# Patient Record
Sex: FEMALE | Race: WHITE | NOT HISPANIC OR LATINO | Employment: OTHER | URBAN - METROPOLITAN AREA
[De-identification: names, ages, dates, MRNs, and addresses within clinical notes are randomized per-mention and may not be internally consistent; named-entity substitution may affect disease eponyms.]

---

## 2017-02-22 ENCOUNTER — HOSPITAL ENCOUNTER (OUTPATIENT)
Dept: RADIOLOGY | Facility: MEDICAL CENTER | Age: 62
End: 2017-02-22
Attending: SURGERY

## 2017-02-22 DIAGNOSIS — Z12.31 SCREENING MAMMOGRAM, ENCOUNTER FOR: ICD-10-CM

## 2017-02-22 PROCEDURE — G0202 SCR MAMMO BI INCL CAD: HCPCS

## 2018-01-05 ENCOUNTER — HOSPITAL ENCOUNTER (OUTPATIENT)
Dept: LAB | Facility: MEDICAL CENTER | Age: 63
End: 2018-01-05
Attending: FAMILY MEDICINE
Payer: COMMERCIAL

## 2018-01-05 LAB
ALBUMIN SERPL BCP-MCNC: 4.1 G/DL (ref 3.2–4.9)
ALBUMIN/GLOB SERPL: 1.4 G/DL
ALP SERPL-CCNC: 104 U/L (ref 30–99)
ALT SERPL-CCNC: 27 U/L (ref 2–50)
ANION GAP SERPL CALC-SCNC: 6 MMOL/L (ref 0–11.9)
APPEARANCE UR: ABNORMAL
AST SERPL-CCNC: 33 U/L (ref 12–45)
BACTERIA #/AREA URNS HPF: ABNORMAL /HPF
BASOPHILS # BLD AUTO: 0 % (ref 0–1.8)
BASOPHILS # BLD: 0 K/UL (ref 0–0.12)
BILIRUB SERPL-MCNC: 0.5 MG/DL (ref 0.1–1.5)
BILIRUB UR QL STRIP.AUTO: NEGATIVE
BUN SERPL-MCNC: 14 MG/DL (ref 8–22)
CALCIUM SERPL-MCNC: 9.5 MG/DL (ref 8.5–10.5)
CHLORIDE SERPL-SCNC: 106 MMOL/L (ref 96–112)
CHOLEST SERPL-MCNC: 197 MG/DL (ref 100–199)
CO2 SERPL-SCNC: 28 MMOL/L (ref 20–33)
COLOR UR: YELLOW
CREAT SERPL-MCNC: 0.81 MG/DL (ref 0.5–1.4)
EOSINOPHIL # BLD AUTO: 0.04 K/UL (ref 0–0.51)
EOSINOPHIL NFR BLD: 1.7 % (ref 0–6.9)
EPI CELLS #/AREA URNS HPF: ABNORMAL /HPF
ERYTHROCYTE [DISTWIDTH] IN BLOOD BY AUTOMATED COUNT: 40.5 FL (ref 35.9–50)
GFR SERPL CREATININE-BSD FRML MDRD: >60 ML/MIN/1.73 M 2
GLOBULIN SER CALC-MCNC: 2.9 G/DL (ref 1.9–3.5)
GLUCOSE SERPL-MCNC: 84 MG/DL (ref 65–99)
GLUCOSE UR STRIP.AUTO-MCNC: NEGATIVE MG/DL
HCT VFR BLD AUTO: 40.1 % (ref 37–47)
HDLC SERPL-MCNC: 47 MG/DL
HGB BLD-MCNC: 13.2 G/DL (ref 12–16)
HYALINE CASTS #/AREA URNS LPF: ABNORMAL /LPF
KETONES UR STRIP.AUTO-MCNC: NEGATIVE MG/DL
LDLC SERPL CALC-MCNC: 130 MG/DL
LEUKOCYTE ESTERASE UR QL STRIP.AUTO: ABNORMAL
LG PLATELETS BLD QL SMEAR: NORMAL
LYMPHOCYTES # BLD AUTO: 0.88 K/UL (ref 1–4.8)
LYMPHOCYTES NFR BLD: 40 % (ref 22–41)
MANUAL DIFF BLD: NORMAL
MCH RBC QN AUTO: 30.5 PG (ref 27–33)
MCHC RBC AUTO-ENTMCNC: 32.9 G/DL (ref 33.6–35)
MCV RBC AUTO: 92.6 FL (ref 81.4–97.8)
MICRO URNS: ABNORMAL
MONOCYTES # BLD AUTO: 0.08 K/UL (ref 0–0.85)
MONOCYTES NFR BLD AUTO: 3.5 % (ref 0–13.4)
MORPHOLOGY BLD-IMP: NORMAL
MUCOUS THREADS #/AREA URNS HPF: ABNORMAL /HPF
NEUTROPHILS # BLD AUTO: 1.21 K/UL (ref 2–7.15)
NEUTROPHILS NFR BLD: 54.8 % (ref 44–72)
NITRITE UR QL STRIP.AUTO: NEGATIVE
NRBC # BLD AUTO: 0 K/UL
NRBC BLD-RTO: 0 /100 WBC
PH UR STRIP.AUTO: 5.5 [PH]
PLATELET # BLD AUTO: 161 K/UL (ref 164–446)
PMV BLD AUTO: 10.3 FL (ref 9–12.9)
POTASSIUM SERPL-SCNC: 4 MMOL/L (ref 3.6–5.5)
PROT SERPL-MCNC: 7 G/DL (ref 6–8.2)
PROT UR QL STRIP: NEGATIVE MG/DL
RBC # BLD AUTO: 4.33 M/UL (ref 4.2–5.4)
RBC # URNS HPF: ABNORMAL /HPF
RBC BLD AUTO: PRESENT
RBC UR QL AUTO: NEGATIVE
RENAL EPI CELLS #/AREA URNS HPF: ABNORMAL /HPF
SODIUM SERPL-SCNC: 140 MMOL/L (ref 135–145)
SP GR UR STRIP.AUTO: 1.02
TRIGL SERPL-MCNC: 99 MG/DL (ref 0–149)
UROBILINOGEN UR STRIP.AUTO-MCNC: 1 MG/DL
WBC # BLD AUTO: 2.2 K/UL (ref 4.8–10.8)
WBC #/AREA URNS HPF: ABNORMAL /HPF

## 2018-01-05 PROCEDURE — 85007 BL SMEAR W/DIFF WBC COUNT: CPT

## 2018-01-05 PROCEDURE — 80061 LIPID PANEL: CPT

## 2018-01-05 PROCEDURE — 85027 COMPLETE CBC AUTOMATED: CPT

## 2018-01-05 PROCEDURE — 80053 COMPREHEN METABOLIC PANEL: CPT

## 2018-01-05 PROCEDURE — 36415 COLL VENOUS BLD VENIPUNCTURE: CPT

## 2018-01-05 PROCEDURE — 81001 URINALYSIS AUTO W/SCOPE: CPT

## 2018-02-05 ENCOUNTER — HOSPITAL ENCOUNTER (OUTPATIENT)
Facility: MEDICAL CENTER | Age: 63
End: 2018-02-05
Attending: INTERNAL MEDICINE
Payer: COMMERCIAL

## 2018-02-05 PROCEDURE — 84443 ASSAY THYROID STIM HORMONE: CPT

## 2018-02-05 PROCEDURE — 80053 COMPREHEN METABOLIC PANEL: CPT

## 2018-02-05 PROCEDURE — 80074 ACUTE HEPATITIS PANEL: CPT

## 2018-02-05 PROCEDURE — 86702 HIV-2 ANTIBODY: CPT

## 2018-02-05 PROCEDURE — 82746 ASSAY OF FOLIC ACID SERUM: CPT

## 2018-02-05 PROCEDURE — 87536 HIV-1 QUANT&REVRSE TRNSCRPJ: CPT

## 2018-02-05 PROCEDURE — 86701 HIV-1ANTIBODY: CPT

## 2018-02-05 PROCEDURE — 86225 DNA ANTIBODY NATIVE: CPT

## 2018-02-05 PROCEDURE — 86038 ANTINUCLEAR ANTIBODIES: CPT

## 2018-02-05 PROCEDURE — 86235 NUCLEAR ANTIGEN ANTIBODY: CPT | Mod: 91

## 2018-02-05 PROCEDURE — 82607 VITAMIN B-12: CPT

## 2018-02-06 LAB
ALBUMIN SERPL BCP-MCNC: 4.9 G/DL (ref 3.2–4.9)
ALBUMIN/GLOB SERPL: 2.2 G/DL
ALP SERPL-CCNC: 84 U/L (ref 30–99)
ALT SERPL-CCNC: 20 U/L (ref 2–50)
ANION GAP SERPL CALC-SCNC: 11 MMOL/L (ref 0–11.9)
AST SERPL-CCNC: 26 U/L (ref 12–45)
BILIRUB SERPL-MCNC: 0.8 MG/DL (ref 0.1–1.5)
BUN SERPL-MCNC: 16 MG/DL (ref 8–22)
CALCIUM SERPL-MCNC: 9.9 MG/DL (ref 8.5–10.5)
CHLORIDE SERPL-SCNC: 105 MMOL/L (ref 96–112)
CO2 SERPL-SCNC: 25 MMOL/L (ref 20–33)
CREAT SERPL-MCNC: 0.92 MG/DL (ref 0.5–1.4)
FOLATE SERPL-MCNC: 13.6 NG/ML
GLOBULIN SER CALC-MCNC: 2.2 G/DL (ref 1.9–3.5)
GLUCOSE SERPL-MCNC: 78 MG/DL (ref 65–99)
HAV IGM SERPL QL IA: NEGATIVE
HBV CORE IGM SER QL: NEGATIVE
HBV SURFACE AG SER QL: NEGATIVE
HCV AB SER QL: NEGATIVE
POTASSIUM SERPL-SCNC: 4 MMOL/L (ref 3.6–5.5)
PROT SERPL-MCNC: 7.1 G/DL (ref 6–8.2)
SODIUM SERPL-SCNC: 141 MMOL/L (ref 135–145)
TSH SERPL DL<=0.005 MIU/L-ACNC: 2.37 UIU/ML (ref 0.38–5.33)
VIT B12 SERPL-MCNC: 206 PG/ML (ref 211–911)

## 2018-02-07 LAB
HIV 1 & 2 AB SER-IMP: NORMAL
HIV 1 & 2 AB SERPL IA.RAPID: NORMAL
HIV 2 AB SERPL QL IA: NEGATIVE
HIV1 AB SERPL QL IA: NEGATIVE
NUCLEAR IGG SER QL IA: NORMAL

## 2018-02-08 LAB
HIV1 RNA # SERPL NAA+PROBE: <20 CPY/ML
HIV1 RNA SER-IMP: NOT DETECTED
HIV1 RNA SERPL NAA+PROBE-LOG#: <1.3 LOG

## 2018-02-26 ENCOUNTER — HOSPITAL ENCOUNTER (OUTPATIENT)
Dept: RADIOLOGY | Facility: MEDICAL CENTER | Age: 63
End: 2018-02-26
Attending: FAMILY MEDICINE
Payer: COMMERCIAL

## 2018-02-26 DIAGNOSIS — Z12.31 SCREENING MAMMOGRAM, ENCOUNTER FOR: ICD-10-CM

## 2018-02-26 PROCEDURE — 77063 BREAST TOMOSYNTHESIS BI: CPT

## 2018-06-01 DIAGNOSIS — Z01.812 PRE-OPERATIVE LABORATORY EXAMINATION: ICD-10-CM

## 2018-06-01 DIAGNOSIS — Z01.810 PRE-OPERATIVE CARDIOVASCULAR EXAMINATION: ICD-10-CM

## 2018-06-01 LAB
ANION GAP SERPL CALC-SCNC: 8 MMOL/L (ref 0–11.9)
BUN SERPL-MCNC: 23 MG/DL (ref 8–22)
CALCIUM SERPL-MCNC: 9.8 MG/DL (ref 8.5–10.5)
CHLORIDE SERPL-SCNC: 105 MMOL/L (ref 96–112)
CO2 SERPL-SCNC: 26 MMOL/L (ref 20–33)
CREAT SERPL-MCNC: 0.82 MG/DL (ref 0.5–1.4)
EKG IMPRESSION: NORMAL
GLUCOSE SERPL-MCNC: 83 MG/DL (ref 65–99)
POTASSIUM SERPL-SCNC: 3.9 MMOL/L (ref 3.6–5.5)
SODIUM SERPL-SCNC: 139 MMOL/L (ref 135–145)

## 2018-06-01 PROCEDURE — 93010 ELECTROCARDIOGRAM REPORT: CPT | Performed by: INTERNAL MEDICINE

## 2018-06-01 PROCEDURE — 93005 ELECTROCARDIOGRAM TRACING: CPT

## 2018-06-01 PROCEDURE — 36415 COLL VENOUS BLD VENIPUNCTURE: CPT

## 2018-06-01 PROCEDURE — 80048 BASIC METABOLIC PNL TOTAL CA: CPT

## 2018-06-01 RX ORDER — FLUTICASONE PROPIONATE 50 MCG
1 SPRAY, SUSPENSION (ML) NASAL 2 TIMES DAILY PRN
COMMUNITY
End: 2020-05-22

## 2018-06-01 NOTE — OR NURSING
Pre admit apt: Pt. Instructed to continue regularly prescribed medications through day before surgery.  Instructed to take the following medications, the day of surgery, with a sip of water per anesthesia protocol: None

## 2018-06-05 ENCOUNTER — APPOINTMENT (OUTPATIENT)
Dept: RADIOLOGY | Facility: MEDICAL CENTER | Age: 63
End: 2018-06-05
Attending: ORTHOPAEDIC SURGERY
Payer: COMMERCIAL

## 2018-06-05 ENCOUNTER — HOSPITAL ENCOUNTER (OUTPATIENT)
Facility: MEDICAL CENTER | Age: 63
End: 2018-06-05
Attending: ORTHOPAEDIC SURGERY | Admitting: ORTHOPAEDIC SURGERY
Payer: COMMERCIAL

## 2018-06-05 VITALS
HEART RATE: 64 BPM | BODY MASS INDEX: 24.83 KG/M2 | OXYGEN SATURATION: 95 % | WEIGHT: 134.92 LBS | DIASTOLIC BLOOD PRESSURE: 79 MMHG | TEMPERATURE: 97.2 F | SYSTOLIC BLOOD PRESSURE: 147 MMHG | RESPIRATION RATE: 16 BRPM | HEIGHT: 62 IN

## 2018-06-05 PROBLEM — M19.031: Status: ACTIVE | Noted: 2018-06-05

## 2018-06-05 PROCEDURE — 160039 HCHG SURGERY MINUTES - EA ADDL 1 MIN LEVEL 3: Performed by: ORTHOPAEDIC SURGERY

## 2018-06-05 PROCEDURE — 700102 HCHG RX REV CODE 250 W/ 637 OVERRIDE(OP): Performed by: ORTHOPAEDIC SURGERY

## 2018-06-05 PROCEDURE — A9270 NON-COVERED ITEM OR SERVICE: HCPCS | Performed by: ORTHOPAEDIC SURGERY

## 2018-06-05 PROCEDURE — 700111 HCHG RX REV CODE 636 W/ 250 OVERRIDE (IP): Performed by: ORTHOPAEDIC SURGERY

## 2018-06-05 PROCEDURE — 502576 HCHG PACK, HAND: Performed by: ORTHOPAEDIC SURGERY

## 2018-06-05 PROCEDURE — 160035 HCHG PACU - 1ST 60 MINS PHASE I: Performed by: ORTHOPAEDIC SURGERY

## 2018-06-05 PROCEDURE — 700101 HCHG RX REV CODE 250

## 2018-06-05 PROCEDURE — 700111 HCHG RX REV CODE 636 W/ 250 OVERRIDE (IP)

## 2018-06-05 PROCEDURE — 160036 HCHG PACU - EA ADDL 30 MINS PHASE I: Performed by: ORTHOPAEDIC SURGERY

## 2018-06-05 PROCEDURE — 160009 HCHG ANES TIME/MIN: Performed by: ORTHOPAEDIC SURGERY

## 2018-06-05 PROCEDURE — A4565 SLINGS: HCPCS | Performed by: ORTHOPAEDIC SURGERY

## 2018-06-05 PROCEDURE — 501838 HCHG SUTURE GENERAL: Performed by: ORTHOPAEDIC SURGERY

## 2018-06-05 PROCEDURE — A9270 NON-COVERED ITEM OR SERVICE: HCPCS

## 2018-06-05 PROCEDURE — 160046 HCHG PACU - 1ST 60 MINS PHASE II: Performed by: ORTHOPAEDIC SURGERY

## 2018-06-05 PROCEDURE — C1713 ANCHOR/SCREW BN/BN,TIS/BN: HCPCS | Performed by: ORTHOPAEDIC SURGERY

## 2018-06-05 PROCEDURE — 160002 HCHG RECOVERY MINUTES (STAT): Performed by: ORTHOPAEDIC SURGERY

## 2018-06-05 PROCEDURE — 700102 HCHG RX REV CODE 250 W/ 637 OVERRIDE(OP)

## 2018-06-05 PROCEDURE — 160028 HCHG SURGERY MINUTES - 1ST 30 MINS LEVEL 3: Performed by: ORTHOPAEDIC SURGERY

## 2018-06-05 PROCEDURE — 160025 RECOVERY II MINUTES (STATS): Performed by: ORTHOPAEDIC SURGERY

## 2018-06-05 PROCEDURE — 160048 HCHG OR STATISTICAL LEVEL 1-5: Performed by: ORTHOPAEDIC SURGERY

## 2018-06-05 DEVICE — SUTURE ANCHOR TWINFIX 2.8 WITH NEEDLES SMALL JOINT: Type: IMPLANTABLE DEVICE | Site: FINGER | Status: FUNCTIONAL

## 2018-06-05 RX ORDER — ONDANSETRON 2 MG/ML
4 INJECTION INTRAMUSCULAR; INTRAVENOUS EVERY 4 HOURS PRN
Status: DISCONTINUED | OUTPATIENT
Start: 2018-06-05 | End: 2018-06-05 | Stop reason: HOSPADM

## 2018-06-05 RX ORDER — BUPIVACAINE HYDROCHLORIDE 5 MG/ML
INJECTION, SOLUTION EPIDURAL; INTRACAUDAL
Status: DISCONTINUED | OUTPATIENT
Start: 2018-06-05 | End: 2018-06-05 | Stop reason: HOSPADM

## 2018-06-05 RX ORDER — DEXAMETHASONE SODIUM PHOSPHATE 4 MG/ML
4 INJECTION, SOLUTION INTRA-ARTICULAR; INTRALESIONAL; INTRAMUSCULAR; INTRAVENOUS; SOFT TISSUE
Status: DISCONTINUED | OUTPATIENT
Start: 2018-06-05 | End: 2018-06-05 | Stop reason: HOSPADM

## 2018-06-05 RX ORDER — CEFAZOLIN SODIUM 1 G/3ML
INJECTION, POWDER, FOR SOLUTION INTRAMUSCULAR; INTRAVENOUS
Status: DISCONTINUED | OUTPATIENT
Start: 2018-06-05 | End: 2018-06-05 | Stop reason: HOSPADM

## 2018-06-05 RX ORDER — GABAPENTIN 300 MG/1
CAPSULE ORAL
Status: COMPLETED
Start: 2018-06-05 | End: 2018-06-05

## 2018-06-05 RX ORDER — OXYCODONE HCL 5 MG/5 ML
SOLUTION, ORAL ORAL
Status: COMPLETED
Start: 2018-06-05 | End: 2018-06-05

## 2018-06-05 RX ORDER — DIPHENHYDRAMINE HYDROCHLORIDE 50 MG/ML
25 INJECTION INTRAMUSCULAR; INTRAVENOUS EVERY 6 HOURS PRN
Status: DISCONTINUED | OUTPATIENT
Start: 2018-06-05 | End: 2018-06-05 | Stop reason: HOSPADM

## 2018-06-05 RX ORDER — HALOPERIDOL 5 MG/ML
1 INJECTION INTRAMUSCULAR EVERY 6 HOURS PRN
Status: DISCONTINUED | OUTPATIENT
Start: 2018-06-05 | End: 2018-06-05 | Stop reason: HOSPADM

## 2018-06-05 RX ORDER — CELECOXIB 200 MG/1
CAPSULE ORAL
Status: COMPLETED
Start: 2018-06-05 | End: 2018-06-05

## 2018-06-05 RX ORDER — OXYCODONE HYDROCHLORIDE 5 MG/1
5 TABLET ORAL
Status: DISCONTINUED | OUTPATIENT
Start: 2018-06-05 | End: 2018-06-05 | Stop reason: HOSPADM

## 2018-06-05 RX ORDER — SCOLOPAMINE TRANSDERMAL SYSTEM 1 MG/1
1 PATCH, EXTENDED RELEASE TRANSDERMAL
Status: DISCONTINUED | OUTPATIENT
Start: 2018-06-05 | End: 2018-06-05 | Stop reason: HOSPADM

## 2018-06-05 RX ORDER — ACETAMINOPHEN 500 MG
TABLET ORAL
Status: COMPLETED
Start: 2018-06-05 | End: 2018-06-05

## 2018-06-05 RX ORDER — SODIUM CHLORIDE, SODIUM LACTATE, POTASSIUM CHLORIDE, CALCIUM CHLORIDE 600; 310; 30; 20 MG/100ML; MG/100ML; MG/100ML; MG/100ML
1000 INJECTION, SOLUTION INTRAVENOUS
Status: ACTIVE | OUTPATIENT
Start: 2018-06-05 | End: 2018-06-05

## 2018-06-05 RX ADMIN — CELECOXIB 400 MG: 200 CAPSULE ORAL at 12:35

## 2018-06-05 RX ADMIN — OXYCODONE HYDROCHLORIDE 5 MG: 5 SOLUTION ORAL at 14:54

## 2018-06-05 RX ADMIN — ACETAMINOPHEN 1000 MG: 500 TABLET, COATED ORAL at 12:35

## 2018-06-05 RX ADMIN — GABAPENTIN 300 MG: 300 CAPSULE ORAL at 12:35

## 2018-06-05 ASSESSMENT — PAIN SCALES - GENERAL
PAINLEVEL_OUTOF10: 1
PAINLEVEL_OUTOF10: 1
PAINLEVEL_OUTOF10: 5
PAINLEVEL_OUTOF10: 1
PAINLEVEL_OUTOF10: 1
PAINLEVEL_OUTOF10: ASSUMED PAIN PRESENT

## 2018-06-05 NOTE — OR NURSING
1454- Pt c/o pain 4-5/10 to R hand.  Pt given oral pain med, see Mar.    1502-  to stage 2.  1508- DC instructions given, pt and  verbalize understanding.  Pt states pain starting to get better. Pt given simple sling for RUE to help keep elevated and remind pt not to weight bear with RUE.   1525- Pt DC'd home to  via w/c to private vehicle. Sling on pt.

## 2018-06-05 NOTE — OR SURGEON
Immediate Post OP Note    PreOp Diagnosis: R cmc oa    PostOp Diagnosis: same    Procedure(s):  FINGER ARTHROPLASTY - CARPOMETACARPAL - Wound Class: Clean  TENDON TRANSFER - FLEXOR CARPI RADIALIS - Wound Class: Clean    Surgeon(s):  Hieu Salamanca M.D.    Anesthesiologist/Type of Anesthesia:  Anesthesiologist: Sekou Quezada M.D./General    Surgical Staff:  Circulator: Mich Valero R.N.  Scrub Person: Jono Gold    Specimens removed if any:  * No specimens in log *    Estimated Blood Loss: min    Findings: end stage oa    Complications: none        6/5/2018 12:29 PM Hieu Salamanca M.D.

## 2018-06-05 NOTE — OR NURSING
1337: To PACU post right carpometacarpal arthroplasty, right flexor tendon repair. LMA in place. Fingers are pink and warm w/ brisk cap refill.  1353: LMA dc'd, breathing is spontaneous and unlabored. States pain is 1/10, no nausea. Able to sense touch to fingers and able to move same.  1422: No change, meets criteria for stage ll.

## 2018-06-05 NOTE — OP REPORT
DATE OF SERVICE:  06/05/2018    PREOPERATIVE DIAGNOSES:  Right thumb carpometacarpal joint osteoarthritis.    POSTOPERATIVE DIAGNOSIS:  Right thumb carpometacarpal joint osteoarthritis.    PROCEDURES:  1.  Right thumb CMC joint arthroplasty, trapezial excision.  2.  FCR tendon transfer interposition.    SURGEON:  Hieu Salamanca MD    ANESTHESIA:  General.    ESTIMATED BLOOD LOSS:  Minimal.    DRAINS:  None.    COMPLICATIONS:  None.    INDICATIONS:  Patient is a female, who has endstage degenerative thumb   changes, wishes to proceed with operative intervention.  I explained the   risks, benefits, complications, and alternatives to surgery, ongoing pain,   neurovascular injury, infection, need for further surgery.  All questions were   answered.  Signed consent was obtained.    DESCRIPTION OF PROCEDURE:  Patient was taken to the operating room and laid   supine on the table.  All bony prominences well padded.  Adequate general was   obtained without difficulty.  Right upper extremity was prepped and draped in   the usual sterile fashion using a ChloraPrep.  Limb was exsanguinated with   elevation.  Tourniquet was raised.  Total tourniquet time was under an hour.    I made a thenar incision to the FCR, thenar muscles elevated.  The CMC joint   was identified.  There was endstage degenerative changes and osteophytes,   significant amount of fluid.  I then removed the trapezium in its entirety   without difficulty, although the scaphotrapezoid joint was visualized, there   appeared to have reasonable articular cartilage.  I then prepared the base of   the thumb metacarpal with the bur down to cancellous bone.  I then placed 2   anchors from [employer]hew on the base of the thumb metacarpal.  I then   went proximal in the forearm to try and prevent collapse, identified the FCR   tendon, made a transverse incision through skin and subcutaneous tissues and   is freed from surrounding attachments, transected and pulled  out the distal   one.  The proximal one was irrigated, closed with nylon.  I freed it down to   its insertion on the base of the second metacarpal.  I removed any soft   tissues osteophytes.  I then sutured it to the cancellous bone using the   previous anchors.  The remainder of the FCR tendon was then rolled with use of   interpositional ____ spacer, also was secured with previous anchor sutures,   thenar muscle was closed with Vicryl, skin with nylon.  Was viewed under   fluoroscopy, found to be in nice suspension plasty, stable with ballottement,   maintenance of nice height, local without epinephrine was injected.  Sterile   dressing of Xeroform, 4x4, Sof-Rol, and thumb spica splint was applied.  All   needle and sponge counts were correct.  Patient tolerated the procedure well   and was transferred to recovery room in stable condition.       ____________________________________     MD BEULAH FIERRO / NESSA    DD:  06/05/2018 13:56:01  DT:  06/05/2018 16:35:42    D#:  [id number]  Job#:  [id number]

## 2018-06-05 NOTE — DISCHARGE INSTRUCTIONS
ACTIVITY: Rest and take it easy for the first 24 hours.  A responsible adult is recommended to remain with you during that time.  It is normal to feel sleepy.  We encourage you to not do anything that requires balance, judgment or coordination.    MILD FLU-LIKE SYMPTOMS ARE NORMAL. YOU MAY EXPERIENCE GENERALIZED MUSCLE ACHES, THROAT IRRITATION, HEADACHE AND/OR SOME NAUSEA.    FOR 24 HOURS DO NOT:  Drive, operate machinery or run household appliances.  Drink beer or alcoholic beverages.   Make important decisions or sign legal documents.    SPECIAL INSTRUCTIONS:   Activity is to be non weight bearing to operative extremity.  Keep dressing clean and dry   Elevate extremity   Keep dressing on until next appointment   Encourage moving all fingers    DIET: To avoid nausea, slowly advance diet as tolerated, avoiding spicy or greasy foods for the first day.  Add more substantial food to your diet according to your physician's instructions. INCREASE FLUIDS AND FIBER TO AVOID CONSTIPATION.      FOLLOW-UP APPOINTMENT:  A follow-up appointment should be arranged with your doctor; call to schedule.    You should CALL YOUR PHYSICIAN if you develop:  Fever greater than 101 degrees F.  Pain not relieved by medication, or persistent nausea or vomiting.  Excessive bleeding (blood soaking through dressing) or unexpected drainage from the wound.  Extreme redness or swelling around the incision site, drainage of pus or foul smelling drainage.  Inability to urinate or empty your bladder within 8 hours.    You should call 911 if you develop problems with breathing or chest pain.    If you are unable to contact your doctor or surgical center, you should go to the nearest emergency room or urgent care center.      Physician's telephone #: Dr. Salamanca [phone]    If any questions arise, call your doctor.  If your doctor is not available, please feel free to call the Surgical Center at [phone].  The Center is open Monday through  Friday from 7AM to 7PM.      You can also call the HEALTH HOTLINE open 24 hours/day, 7 days/week and speak to a nurse at [phone], or toll free at [phone].    A registered nurse may call you a few days after your surgery to see how you are doing after your procedure.    MEDICATIONS: Resume taking daily medication.  Take prescribed pain medication with food.  If no medication is prescribed, you may take non-aspirin pain medication if needed.  PAIN MEDICATION CAN BE VERY CONSTIPATING.  Take a stool softener or laxative such as senokot, pericolace, or milk of magnesia if needed.    Prescription given for Oxycodone.      Last pain medication given at ________________________________________.    If your physician has prescribed pain medication that includes Acetaminophen (Tylenol), do not take additional Acetaminophen (Tylenol) while taking the prescribed medication.    Depression / Suicide Risk    As you are discharged from this [facility] facility, it is important to learn how to keep safe from harming yourself.    Recognize the warning signs:  · Abrupt changes in personality, positive or negative- including increase in energy   · Giving away possessions  · Change in eating patterns- significant weight changes-  positive or negative  · Change in sleeping patterns- unable to sleep or sleeping all the time   · Unwillingness or inability to communicate  · Depression  · Unusual sadness, discouragement and loneliness  · Talk of wanting to die  · Neglect of personal appearance   · Rebelliousness- reckless behavior  · Withdrawal from people/activities they love  · Confusion- inability to concentrate     If you or a loved one observes any of these behaviors or has concerns about self-harm, here's what you can do:  · Talk about it- your feelings and reasons for harming yourself  · Remove any means that you might use to hurt yourself (examples: pills, rope, extension cords, firearm)  · Get professional help from  the community (Mental Health, Substance Abuse, psychological counseling)  · Do not be alone:Call your Safe Contact- someone whom you trust who will be there for you.  · Call your local CRISIS HOTLINE 439-7188 or 116-237-9276  · Call your local Children's Mobile Crisis Response Team [place] (044) 053-6207 or www.Pristine.io  · Call the toll free National Suicide Prevention Hotlines   · National Suicide Prevention Lifeline 007-579-OFOX (9764)  · National Hope Line Network 800-SUICIDE (024-7256)

## 2018-06-18 ENCOUNTER — HOSPITAL ENCOUNTER (OUTPATIENT)
Dept: LAB | Facility: MEDICAL CENTER | Age: 63
End: 2018-06-18
Attending: FAMILY MEDICINE
Payer: COMMERCIAL

## 2018-06-18 LAB
BASOPHILS # BLD AUTO: 0.7 % (ref 0–1.8)
BASOPHILS # BLD: 0.03 K/UL (ref 0–0.12)
EOSINOPHIL # BLD AUTO: 0.05 K/UL (ref 0–0.51)
EOSINOPHIL NFR BLD: 1.2 % (ref 0–6.9)
ERYTHROCYTE [DISTWIDTH] IN BLOOD BY AUTOMATED COUNT: 41.5 FL (ref 35.9–50)
HCT VFR BLD AUTO: 41.1 % (ref 37–47)
HGB BLD-MCNC: 13.6 G/DL (ref 12–16)
IMM GRANULOCYTES # BLD AUTO: 0.01 K/UL (ref 0–0.11)
IMM GRANULOCYTES NFR BLD AUTO: 0.2 % (ref 0–0.9)
LYMPHOCYTES # BLD AUTO: 0.82 K/UL (ref 1–4.8)
LYMPHOCYTES NFR BLD: 19.4 % (ref 22–41)
MCH RBC QN AUTO: 30.2 PG (ref 27–33)
MCHC RBC AUTO-ENTMCNC: 33.1 G/DL (ref 33.6–35)
MCV RBC AUTO: 91.3 FL (ref 81.4–97.8)
MONOCYTES # BLD AUTO: 0.27 K/UL (ref 0–0.85)
MONOCYTES NFR BLD AUTO: 6.4 % (ref 0–13.4)
NEUTROPHILS # BLD AUTO: 3.04 K/UL (ref 2–7.15)
NEUTROPHILS NFR BLD: 72.1 % (ref 44–72)
NRBC # BLD AUTO: 0 K/UL
NRBC BLD-RTO: 0 /100 WBC
PLATELET # BLD AUTO: 200 K/UL (ref 164–446)
PMV BLD AUTO: 10.6 FL (ref 9–12.9)
RBC # BLD AUTO: 4.5 M/UL (ref 4.2–5.4)
VIT B12 SERPL-MCNC: 397 PG/ML (ref 211–911)
WBC # BLD AUTO: 4.2 K/UL (ref 4.8–10.8)

## 2018-06-18 PROCEDURE — 82607 VITAMIN B-12: CPT

## 2018-06-18 PROCEDURE — 36415 COLL VENOUS BLD VENIPUNCTURE: CPT

## 2018-06-18 PROCEDURE — 85025 COMPLETE CBC W/AUTO DIFF WBC: CPT

## 2018-09-14 DIAGNOSIS — Z01.812 PRE-OPERATIVE LABORATORY EXAMINATION: ICD-10-CM

## 2018-09-14 PROCEDURE — 80048 BASIC METABOLIC PNL TOTAL CA: CPT

## 2018-09-14 PROCEDURE — 36415 COLL VENOUS BLD VENIPUNCTURE: CPT

## 2018-09-14 RX ORDER — DIPHENHYDRAMINE HCL 25 MG
25 TABLET ORAL NIGHTLY PRN
COMMUNITY
End: 2019-08-08

## 2018-09-14 NOTE — OR NURSING
Pre admit apt: Pt. Instructed to continue regularly prescribed medications through day before surgery.  Instructed to take the following medications, the day of surgery, with a sip of water per anesthesia protocol:flonase, prilosec

## 2018-09-15 LAB
ANION GAP SERPL CALC-SCNC: 7 MMOL/L (ref 0–11.9)
BUN SERPL-MCNC: 23 MG/DL (ref 8–22)
CALCIUM SERPL-MCNC: 9.6 MG/DL (ref 8.5–10.5)
CHLORIDE SERPL-SCNC: 103 MMOL/L (ref 96–112)
CO2 SERPL-SCNC: 27 MMOL/L (ref 20–33)
CREAT SERPL-MCNC: 0.88 MG/DL (ref 0.5–1.4)
GLUCOSE SERPL-MCNC: 91 MG/DL (ref 65–99)
POTASSIUM SERPL-SCNC: 3.9 MMOL/L (ref 3.6–5.5)
SODIUM SERPL-SCNC: 137 MMOL/L (ref 135–145)

## 2018-09-25 ENCOUNTER — HOSPITAL ENCOUNTER (OUTPATIENT)
Facility: MEDICAL CENTER | Age: 63
End: 2018-09-25
Attending: ORTHOPAEDIC SURGERY | Admitting: ORTHOPAEDIC SURGERY
Payer: COMMERCIAL

## 2018-09-25 ENCOUNTER — APPOINTMENT (OUTPATIENT)
Dept: RADIOLOGY | Facility: MEDICAL CENTER | Age: 63
End: 2018-09-25
Attending: ORTHOPAEDIC SURGERY
Payer: COMMERCIAL

## 2018-09-25 VITALS
BODY MASS INDEX: 26.1 KG/M2 | RESPIRATION RATE: 16 BRPM | SYSTOLIC BLOOD PRESSURE: 142 MMHG | HEART RATE: 62 BPM | DIASTOLIC BLOOD PRESSURE: 86 MMHG | TEMPERATURE: 97.2 F | HEIGHT: 61 IN | OXYGEN SATURATION: 96 % | WEIGHT: 138.23 LBS

## 2018-09-25 PROBLEM — M18.12 PRIMARY OSTEOARTHRITIS OF FIRST CARPOMETACARPAL JOINT OF LEFT HAND: Status: ACTIVE | Noted: 2018-09-25

## 2018-09-25 LAB — PATHOLOGY CONSULT NOTE: NORMAL

## 2018-09-25 PROCEDURE — A6222 GAUZE <=16 IN NO W/SAL W/O B: HCPCS | Performed by: ORTHOPAEDIC SURGERY

## 2018-09-25 PROCEDURE — 160002 HCHG RECOVERY MINUTES (STAT): Performed by: ORTHOPAEDIC SURGERY

## 2018-09-25 PROCEDURE — 160039 HCHG SURGERY MINUTES - EA ADDL 1 MIN LEVEL 3: Performed by: ORTHOPAEDIC SURGERY

## 2018-09-25 PROCEDURE — C1713 ANCHOR/SCREW BN/BN,TIS/BN: HCPCS | Performed by: ORTHOPAEDIC SURGERY

## 2018-09-25 PROCEDURE — 700111 HCHG RX REV CODE 636 W/ 250 OVERRIDE (IP)

## 2018-09-25 PROCEDURE — 160009 HCHG ANES TIME/MIN: Performed by: ORTHOPAEDIC SURGERY

## 2018-09-25 PROCEDURE — 700101 HCHG RX REV CODE 250

## 2018-09-25 PROCEDURE — 160035 HCHG PACU - 1ST 60 MINS PHASE I: Performed by: ORTHOPAEDIC SURGERY

## 2018-09-25 PROCEDURE — 502576 HCHG PACK, HAND: Performed by: ORTHOPAEDIC SURGERY

## 2018-09-25 PROCEDURE — 160046 HCHG PACU - 1ST 60 MINS PHASE II: Performed by: ORTHOPAEDIC SURGERY

## 2018-09-25 PROCEDURE — 160025 RECOVERY II MINUTES (STATS): Performed by: ORTHOPAEDIC SURGERY

## 2018-09-25 PROCEDURE — A9270 NON-COVERED ITEM OR SERVICE: HCPCS | Performed by: ANESTHESIOLOGY

## 2018-09-25 PROCEDURE — 88304 TISSUE EXAM BY PATHOLOGIST: CPT

## 2018-09-25 PROCEDURE — 501838 HCHG SUTURE GENERAL: Performed by: ORTHOPAEDIC SURGERY

## 2018-09-25 PROCEDURE — 700102 HCHG RX REV CODE 250 W/ 637 OVERRIDE(OP): Performed by: ANESTHESIOLOGY

## 2018-09-25 PROCEDURE — 500562 HCHG FIBERWIRE: Performed by: ORTHOPAEDIC SURGERY

## 2018-09-25 PROCEDURE — 160048 HCHG OR STATISTICAL LEVEL 1-5: Performed by: ORTHOPAEDIC SURGERY

## 2018-09-25 PROCEDURE — 160028 HCHG SURGERY MINUTES - 1ST 30 MINS LEVEL 3: Performed by: ORTHOPAEDIC SURGERY

## 2018-09-25 DEVICE — SUTURE ANCHOR TWINFIX 2.8 WITH NEEDLES SMALL JOINT: Type: IMPLANTABLE DEVICE | Status: FUNCTIONAL

## 2018-09-25 RX ORDER — GABAPENTIN 300 MG/1
300 CAPSULE ORAL ONCE
Status: COMPLETED | OUTPATIENT
Start: 2018-09-25 | End: 2018-09-25

## 2018-09-25 RX ORDER — NALOXONE HYDROCHLORIDE 0.4 MG/ML
0.1 INJECTION, SOLUTION INTRAMUSCULAR; INTRAVENOUS; SUBCUTANEOUS PRN
Status: DISCONTINUED | OUTPATIENT
Start: 2018-09-25 | End: 2018-09-25 | Stop reason: HOSPADM

## 2018-09-25 RX ORDER — DEXAMETHASONE SODIUM PHOSPHATE 4 MG/ML
4 INJECTION, SOLUTION INTRA-ARTICULAR; INTRALESIONAL; INTRAMUSCULAR; INTRAVENOUS; SOFT TISSUE
Status: DISCONTINUED | OUTPATIENT
Start: 2018-09-25 | End: 2018-09-25 | Stop reason: HOSPADM

## 2018-09-25 RX ORDER — BUPIVACAINE HYDROCHLORIDE 5 MG/ML
INJECTION, SOLUTION EPIDURAL; INTRACAUDAL
Status: DISCONTINUED | OUTPATIENT
Start: 2018-09-25 | End: 2018-09-25 | Stop reason: HOSPADM

## 2018-09-25 RX ORDER — SCOLOPAMINE TRANSDERMAL SYSTEM 1 MG/1
1 PATCH, EXTENDED RELEASE TRANSDERMAL
Status: DISCONTINUED | OUTPATIENT
Start: 2018-09-25 | End: 2018-09-25 | Stop reason: HOSPADM

## 2018-09-25 RX ORDER — HALOPERIDOL 5 MG/ML
1 INJECTION INTRAMUSCULAR
Status: DISCONTINUED | OUTPATIENT
Start: 2018-09-25 | End: 2018-09-25 | Stop reason: HOSPADM

## 2018-09-25 RX ORDER — HYDROCODONE BITARTRATE AND ACETAMINOPHEN 7.5; 325 MG/1; MG/1
1 TABLET ORAL EVERY 4 HOURS PRN
Status: DISCONTINUED | OUTPATIENT
Start: 2018-09-25 | End: 2018-09-25 | Stop reason: HOSPADM

## 2018-09-25 RX ORDER — DIPHENHYDRAMINE HYDROCHLORIDE 50 MG/ML
12.5 INJECTION INTRAMUSCULAR; INTRAVENOUS
Status: DISCONTINUED | OUTPATIENT
Start: 2018-09-25 | End: 2018-09-25 | Stop reason: HOSPADM

## 2018-09-25 RX ORDER — SODIUM CHLORIDE, SODIUM LACTATE, POTASSIUM CHLORIDE, CALCIUM CHLORIDE 600; 310; 30; 20 MG/100ML; MG/100ML; MG/100ML; MG/100ML
INJECTION, SOLUTION INTRAVENOUS CONTINUOUS
Status: DISCONTINUED | OUTPATIENT
Start: 2018-09-25 | End: 2018-09-25 | Stop reason: HOSPADM

## 2018-09-25 RX ORDER — OXYCODONE HCL 5 MG/5 ML
10 SOLUTION, ORAL ORAL
Status: DISCONTINUED | OUTPATIENT
Start: 2018-09-25 | End: 2018-09-25 | Stop reason: HOSPADM

## 2018-09-25 RX ORDER — OXYCODONE HYDROCHLORIDE 5 MG/1
5 TABLET ORAL
Status: DISCONTINUED | OUTPATIENT
Start: 2018-09-25 | End: 2018-09-25 | Stop reason: HOSPADM

## 2018-09-25 RX ORDER — ONDANSETRON 2 MG/ML
4 INJECTION INTRAMUSCULAR; INTRAVENOUS EVERY 4 HOURS PRN
Status: DISCONTINUED | OUTPATIENT
Start: 2018-09-25 | End: 2018-09-25 | Stop reason: HOSPADM

## 2018-09-25 RX ORDER — GLYCOPYRROLATE 0.2 MG/ML
0.2 INJECTION INTRAMUSCULAR; INTRAVENOUS
Status: DISCONTINUED | OUTPATIENT
Start: 2018-09-25 | End: 2018-09-25 | Stop reason: HOSPADM

## 2018-09-25 RX ORDER — LIDOCAINE HYDROCHLORIDE 10 MG/ML
INJECTION, SOLUTION EPIDURAL; INFILTRATION; INTRACAUDAL; PERINEURAL
Status: COMPLETED
Start: 2018-09-25 | End: 2018-09-25

## 2018-09-25 RX ORDER — OXYCODONE HCL 5 MG/5 ML
5 SOLUTION, ORAL ORAL
Status: DISCONTINUED | OUTPATIENT
Start: 2018-09-25 | End: 2018-09-25 | Stop reason: HOSPADM

## 2018-09-25 RX ORDER — ACETAMINOPHEN 500 MG
1000 TABLET ORAL ONCE
Status: COMPLETED | OUTPATIENT
Start: 2018-09-25 | End: 2018-09-25

## 2018-09-25 RX ORDER — HALOPERIDOL 5 MG/ML
1 INJECTION INTRAMUSCULAR EVERY 6 HOURS PRN
Status: DISCONTINUED | OUTPATIENT
Start: 2018-09-25 | End: 2018-09-25 | Stop reason: HOSPADM

## 2018-09-25 RX ORDER — MIDAZOLAM HYDROCHLORIDE 1 MG/ML
1 INJECTION INTRAMUSCULAR; INTRAVENOUS
Status: DISCONTINUED | OUTPATIENT
Start: 2018-09-25 | End: 2018-09-25 | Stop reason: HOSPADM

## 2018-09-25 RX ORDER — DIPHENHYDRAMINE HYDROCHLORIDE 50 MG/ML
25 INJECTION INTRAMUSCULAR; INTRAVENOUS EVERY 6 HOURS PRN
Status: DISCONTINUED | OUTPATIENT
Start: 2018-09-25 | End: 2018-09-25 | Stop reason: HOSPADM

## 2018-09-25 RX ORDER — OXYCODONE HYDROCHLORIDE 10 MG/1
10 TABLET ORAL
Status: DISCONTINUED | OUTPATIENT
Start: 2018-09-25 | End: 2018-09-25 | Stop reason: HOSPADM

## 2018-09-25 RX ADMIN — GABAPENTIN 300 MG: 300 CAPSULE ORAL at 08:49

## 2018-09-25 RX ADMIN — LIDOCAINE HYDROCHLORIDE 0.2 ML: 10 INJECTION, SOLUTION EPIDURAL; INFILTRATION; INTRACAUDAL; PERINEURAL at 08:49

## 2018-09-25 RX ADMIN — SODIUM CHLORIDE, SODIUM LACTATE, POTASSIUM CHLORIDE, CALCIUM CHLORIDE 1000 ML: 600; 310; 30; 20 INJECTION, SOLUTION INTRAVENOUS at 08:49

## 2018-09-25 RX ADMIN — ACETAMINOPHEN 1000 MG: 500 TABLET, COATED ORAL at 08:48

## 2018-09-25 ASSESSMENT — PAIN SCALES - GENERAL
PAINLEVEL_OUTOF10: 0

## 2018-09-25 NOTE — DISCHARGE INSTRUCTIONS
ACTIVITY: Rest and take it easy for the first 24 hours.  A responsible adult is recommended to remain with you during that time.  It is normal to feel sleepy.  We encourage you to not do anything that requires balance, judgment or coordination.    MILD FLU-LIKE SYMPTOMS ARE NORMAL. YOU MAY EXPERIENCE GENERALIZED MUSCLE ACHES, THROAT IRRITATION, HEADACHE AND/OR SOME NAUSEA.    FOR 24 HOURS DO NOT:  Drive, operate machinery or run household appliances.  Drink beer or alcoholic beverages.   Make important decisions or sign legal documents.    SPECIAL INSTRUCTIONS: Ice and elevate and do not bear weight with operative hand. Encourage moving all fingers    DIET: To avoid nausea, slowly advance diet as tolerated, avoiding spicy or greasy foods for the first day.  Add more substantial food to your diet according to your physician's instructions.  INCREASE FLUIDS AND FIBER TO AVOID CONSTIPATION.    SURGICAL DRESSING/BATHING: May shower with dressings well covered in plastic to keep them clean/DRY/intact. Keep dressing on until next appointment.    FOLLOW-UP APPOINTMENT:  A follow-up appointment should be arranged with your doctor; call to schedule.    You should CALL YOUR PHYSICIAN if you develop:  Fever greater than 101 degrees F.  Pain not relieved by medication, or persistent nausea or vomiting.  Excessive bleeding (blood soaking through dressing) or unexpected drainage from the wound.  Extreme redness or swelling around the incision site, drainage of pus or foul smelling drainage.  Inability to urinate or empty your bladder within 8 hours.  Problems with breathing or chest pain.    You should call 911 if you develop problems with breathing or chest pain.  If you are unable to contact your doctor or surgical center, you should go to the nearest emergency room or urgent care center.  Physician's telephone #: [phone]    If any questions arise, call your doctor.  If your doctor is not available, please feel free to call  the Surgical Center at [phone].  The Center is open Monday through Friday from 7AM to 7PM.  You can also call the HEALTH HOTLINE open 24 hours/day, 7 days/week and speak to a nurse at [phone], or toll free at [phone].    A registered nurse may call you a few days after your surgery to see how you are doing after your procedure.    MEDICATIONS: Resume taking daily medication.  Take prescribed pain medication with food.  If no medication is prescribed, you may take non-aspirin pain medication if needed.  PAIN MEDICATION CAN BE VERY CONSTIPATING.  Take a stool softener or laxative such as senokot, pericolace, or milk of magnesia if needed.    Prescription given for pain medication, NORCO .  Last pain medication given at .    If your physician has prescribed pain medication that includes Acetaminophen (Tylenol), do not take additional Acetaminophen (Tylenol) while taking the prescribed medication.    Depression / Suicide Risk    As you are discharged from this [facility] facility, it is important to learn how to keep safe from harming yourself.    Recognize the warning signs:  · Abrupt changes in personality, positive or negative- including increase in energy   · Giving away possessions  · Change in eating patterns- significant weight changes-  positive or negative  · Change in sleeping patterns- unable to sleep or sleeping all the time   · Unwillingness or inability to communicate  · Depression  · Unusual sadness, discouragement and loneliness  · Talk of wanting to die  · Neglect of personal appearance   · Rebelliousness- reckless behavior  · Withdrawal from people/activities they love  · Confusion- inability to concentrate     If you or a loved one observes any of these behaviors or has concerns about self-harm, here's what you can do:  · Talk about it- your feelings and reasons for harming yourself  · Remove any means that you might use to hurt yourself (examples: pills, rope, extension  cords, firearm)  · Get professional help from the community (Mental Health, Substance Abuse, psychological counseling)  · Do not be alone:Call your Safe Contact- someone whom you trust who will be there for you.  · Call your local CRISIS HOTLINE 999-1227 or 428-886-7795  · Call your local Children's Mobile Crisis Response Team [place] (697) 241-7166 or www.[employer]  · Call the toll free National Suicide Prevention Hotlines   · National Suicide Prevention Lifeline 317-651-WSRG (9890)  · National Hope Line Network 800-SUICIDE (908-0277)

## 2018-09-25 NOTE — OR SURGEON
Immediate Post OP Note    PreOp Diagnosis: L cmc oa, L volar ganglion    PostOp Diagnosis: same    Procedure(s):  FINGER ARTHROPLASTY- CARPOMETACARPAL  FLEXOR TENDON REPAIR-  CARPI RADIALIS  GANGLION EXCISION-  CYST    Surgeon(s):  Hieu Salamanca M.D.    Anesthesiologist/Type of Anesthesia:  Anesthesiologist: Jamey Vasquez M.D./* No anesthesia type entered *    Surgical Staff:  Circulator: Ninfa Ortega R.N.  Scrub Person: Jono Gold    Specimens removed if any:  * No specimens in log *    Estimated Blood Loss: min    Findings: endstage oa    Complications: none        9/25/2018 9:28 AM Hieu Salamanca M.D.

## 2018-09-25 NOTE — OP REPORT
DATE OF SERVICE:  09/25/2018    PREOPERATIVE DIAGNOSES:  1.  Left carpometacarpal joint osteoarthritis.  2.  Left volar ganglion cyst.    POSTOPERATIVE DIAGNOSES:  1.  Left carpometacarpal joint osteoarthritis.  2.  Left volar ganglion cyst.    PROCEDURES:  1.  Left CMC joint arthroplasty trapezial excision.  2.  FCR tendon transfer interposition.  3.  Excision of volar ganglion cyst.    SURGEON:  Hieu Salamanca MD    ANESTHESIA:  General.    ESTIMATED BLOOD LOSS:  Minimal.    DRAINS:  None.    COMPLICATIONS:  None.    INDICATIONS:  Patient is a female who has endstage degenerative changes.  She   also has a volar ganglion cyst, she wished to have it surgically excised.  I   explained the risks, benefits, complications, and alternatives to arthroplasty   and excision.  All questions were answered.  Signed consent was obtained.    DESCRIPTION OF PROCEDURE:  Patient was taken to operating room and laid supine   on the operating table.  All bony prominences were well padded.  She had good   adequate general obtained without difficulty.  Left upper extremity was   prepped and draped in the usual sterile fashion using ChloraPrep.  Limb was   exsanguinated with elevation, tourniquet raised, total tourniquet time was   under an hour.  I initially made a curved incision around the thenar muscles   of the FCR sheath.  The volar ganglion cyst, I circumferentially went around   and excised.  I did send it to pathology.  I then opened the CMC joint, there   were endstage degenerative changes.  I removed the trapezium in its entirety   in a piecemeal fashion.  Scaphotrapezoid joint appeared to be well maintained.    The articular surface was then taken down the bone on the base of the thumb   metacarpal with a bur and placed 2 anchors in the base of the thumb metacarpal   from [employer]ero [employer]hew.  I then, to try and prevent collapse, went to proximal   forearm, identified the FCR tendon, and made a transverse incision through    skin and subcutaneous tissues.  I freed it from surrounding attachments,   transected it, and pulled it out the distal wound.  The proximal wound was   irrigated and closed with nylon in simple fashion.  I freed the FCR tendon   down to its insertion on the base of the second metacarpal.  I then sutured it   to the cancellous bone using the previously placed anchors.  The remainder of   the FCR tendon was rolled and used as an interpositional spacer, secured with   Vicryl and the previous anchor sutures.  The thenar muscle was closed with   Vicryl and skin with nylon.  Local without epinephrine was injected.  Sterile   dressing of Xeroform, 4x4, Sof-Rol, and a thumb spica splint was applied.  All   needle and sponge counts correct.  Patient tolerated the procedure well and   was transferred to recovery in stable condition.       ____________________________________     MD BEULAH FIERRO / NESSA    DD:  09/25/2018 12:21:13  DT:  09/25/2018 12:53:07    D#:  [id number]  Job#:  [id number]

## 2019-01-21 ENCOUNTER — HOSPITAL ENCOUNTER (OUTPATIENT)
Dept: RADIOLOGY | Facility: MEDICAL CENTER | Age: 64
End: 2019-01-21
Attending: FAMILY MEDICINE
Payer: COMMERCIAL

## 2019-01-21 DIAGNOSIS — M25.552 LEFT HIP PAIN: ICD-10-CM

## 2019-01-21 DIAGNOSIS — M54.50 ACUTE MIDLINE LOW BACK PAIN WITHOUT SCIATICA: ICD-10-CM

## 2019-01-21 DIAGNOSIS — M25.551 RIGHT HIP PAIN: ICD-10-CM

## 2019-01-21 PROCEDURE — 73522 X-RAY EXAM HIPS BI 3-4 VIEWS: CPT

## 2019-01-21 PROCEDURE — 72100 X-RAY EXAM L-S SPINE 2/3 VWS: CPT

## 2019-02-01 ENCOUNTER — HOSPITAL ENCOUNTER (OUTPATIENT)
Dept: RADIOLOGY | Facility: MEDICAL CENTER | Age: 64
End: 2019-02-01
Attending: FAMILY MEDICINE
Payer: COMMERCIAL

## 2019-02-01 DIAGNOSIS — M54.6 PAIN IN THORACIC SPINE: ICD-10-CM

## 2019-02-01 DIAGNOSIS — M85.9 DISORDER OF BONE DENSITY AND STRUCTURE, UNSPECIFIED: ICD-10-CM

## 2019-02-01 PROCEDURE — 72148 MRI LUMBAR SPINE W/O DYE: CPT

## 2019-02-06 ENCOUNTER — HOSPITAL ENCOUNTER (OUTPATIENT)
Dept: RADIOLOGY | Facility: MEDICAL CENTER | Age: 64
End: 2019-02-06
Attending: FAMILY MEDICINE
Payer: COMMERCIAL

## 2019-02-06 DIAGNOSIS — M85.9 DISORDER OF BONE DENSITY AND STRUCTURE, UNSPECIFIED: ICD-10-CM

## 2019-02-06 DIAGNOSIS — M54.6 PAIN IN THORACIC SPINE: ICD-10-CM

## 2019-02-06 PROCEDURE — 77080 DXA BONE DENSITY AXIAL: CPT

## 2019-02-08 ENCOUNTER — HOSPITAL ENCOUNTER (OUTPATIENT)
Dept: RADIOLOGY | Facility: MEDICAL CENTER | Age: 64
End: 2019-02-08
Attending: FAMILY MEDICINE
Payer: COMMERCIAL

## 2019-02-08 ENCOUNTER — HOSPITAL ENCOUNTER (OUTPATIENT)
Dept: LAB | Facility: MEDICAL CENTER | Age: 64
End: 2019-02-08
Attending: FAMILY MEDICINE
Payer: COMMERCIAL

## 2019-02-08 DIAGNOSIS — M25.551 RIGHT HIP PAIN: ICD-10-CM

## 2019-02-08 DIAGNOSIS — M25.552 LEFT HIP PAIN: ICD-10-CM

## 2019-02-08 LAB
BUN SERPL-MCNC: 20 MG/DL (ref 8–22)
CREAT SERPL-MCNC: 0.91 MG/DL (ref 0.5–1.4)

## 2019-02-08 PROCEDURE — 73723 MRI JOINT LWR EXTR W/O&W/DYE: CPT | Mod: LT

## 2019-02-08 PROCEDURE — 82565 ASSAY OF CREATININE: CPT

## 2019-02-08 PROCEDURE — 36415 COLL VENOUS BLD VENIPUNCTURE: CPT

## 2019-02-08 PROCEDURE — A9585 GADOBUTROL INJECTION: HCPCS | Performed by: FAMILY MEDICINE

## 2019-02-08 PROCEDURE — 84520 ASSAY OF UREA NITROGEN: CPT

## 2019-02-08 PROCEDURE — 73723 MRI JOINT LWR EXTR W/O&W/DYE: CPT | Mod: RT

## 2019-02-08 PROCEDURE — 700117 HCHG RX CONTRAST REV CODE 255: Performed by: FAMILY MEDICINE

## 2019-02-08 RX ORDER — GADOBUTROL 604.72 MG/ML
7.5 INJECTION INTRAVENOUS ONCE
Status: COMPLETED | OUTPATIENT
Start: 2019-02-08 | End: 2019-02-08

## 2019-02-08 RX ADMIN — GADOBUTROL 7 ML: 604.72 INJECTION INTRAVENOUS at 16:30

## 2019-02-12 ENCOUNTER — HOSPITAL ENCOUNTER (OUTPATIENT)
Dept: RADIOLOGY | Facility: MEDICAL CENTER | Age: 64
End: 2019-02-12
Attending: FAMILY MEDICINE
Payer: COMMERCIAL

## 2019-02-12 DIAGNOSIS — M25.551 RIGHT HIP PAIN: ICD-10-CM

## 2019-02-12 PROCEDURE — 76882 US LMTD JT/FCL EVL NVASC XTR: CPT | Mod: RT

## 2019-03-07 ENCOUNTER — HOSPITAL ENCOUNTER (OUTPATIENT)
Dept: RADIOLOGY | Facility: MEDICAL CENTER | Age: 64
End: 2019-03-07
Attending: FAMILY MEDICINE
Payer: MEDICARE

## 2019-03-07 DIAGNOSIS — Z12.31 VISIT FOR SCREENING MAMMOGRAM: ICD-10-CM

## 2019-03-07 PROCEDURE — 77063 BREAST TOMOSYNTHESIS BI: CPT

## 2019-03-08 ENCOUNTER — HOSPITAL ENCOUNTER (OUTPATIENT)
Dept: RADIOLOGY | Facility: MEDICAL CENTER | Age: 64
End: 2019-03-08
Attending: ORTHOPAEDIC SURGERY

## 2019-03-08 DIAGNOSIS — D18.00 HEMANGIOMA: ICD-10-CM

## 2019-03-08 ASSESSMENT — ENCOUNTER SYMPTOMS
CHILLS: 0
CARDIOVASCULAR NEGATIVE: 1
WEAKNESS: 0
BLOOD IN STOOL: 0
FALLS: 0
FEVER: 0
ROS GI COMMENTS: NEGATIVE FOR HEMATEMESIS
BRUISES/BLEEDS EASILY: 0
HEMOPTYSIS: 0
WEIGHT LOSS: 0

## 2019-03-08 ASSESSMENT — LIFESTYLE VARIABLES: SUBSTANCE_ABUSE: 0

## 2019-03-08 NOTE — CONSULTS
Neuro Interventional Service Consultation      Re: Haley Hawthorne     MRN: [id number]   : 1955    Haley Hawthorne was referred to our service by Madi Schmidt MD.  She is a 64 y.o. female seen in clinic for evaluation and possible intervention for a right hip hemangioma. She is also under the care of Rose Sanchez MD.    History of Present Illness:  Ms.OBrien Hawthorne has a 1 year history history of atraumatic right hip pain that is worse when she sleeps on it at night. She has had progression of left hip pain that was manageable until the right hip pain worsened. 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 She has been evaluated for excision and the recommendation is to pursue sclerotherapy as primary intervention. She has subsequently been referred to the Neurointerventional Service for evaluation and management of the malformation. Today, she reports constant right hip pain, worst when lying in bed and turning onto the hip. She also has left hip arthritic pain that is worsening and the right hip pain makes positioning difficult. She denies trauma or bleeding at the site. She denies having vascular malformations elsewhere in the body and denies any history of GI bleeding, epistaxis, or hemoptysis. She has no known family history of vascular malformations. Additional clinical history includes hypertension and breast carcinoma treated with radiation therapy. She had anesthesia for orthopedic surgeries in 2018 and denies problems with post op nausea and vomiting. She reports her health has been very good and most of her complaints are orthopedic in nature. She voices concern that the malformation has a possibility of malignancy.      She is seen today for review of imaging studies and discussion of possible sclerosis of a painful right hip hemangioma.     Past Medical History:   Diagnosis Date   •  Arrhythmia     sinus bradycardia   • Arthritis     hands/ hip-osteo   • Breast cancer (HCC)    • Cancer (HCC) 2015    dcis/ right breast, radiation   • Heart burn    • Hypertension    • Indigestion    • Pain 06/2018    hands     Past Surgical History:   Procedure Laterality Date   • FINGER ARTHROPLASTY Left 9/25/2018    Procedure: FINGER ARTHROPLASTY- CARPOMETACARPAL;  Surgeon: Hieu Salamanca M.D.;  Location: SURGERY [facility];  Service: Orthopedics   • FLEXOR TENDON REPAIR Left 9/25/2018    Procedure: FLEXOR TENDON REPAIR-  CARPI RADIALIS;  Surgeon: Hieu Salamanca M.D.;  Location: SURGERY [facility];  Service: Orthopedics   • GANGLION EXCISION Left 9/25/2018    Procedure: GANGLION EXCISION-  CYST;  Surgeon: Hieu Salamanca M.D.;  Location: SURGERY [facility];  Service: Orthopedics   • FINGER ARTHROPLASTY Right 6/5/2018    Procedure: FINGER ARTHROPLASTY - CARPOMETACARPAL;  Surgeon: Hieu Salamanca M.D.;  Location: SURGERY [facility];  Service: Orthopedics   • TENDON TRANSFER  6/5/2018    Procedure: TENDON TRANSFER - FLEXOR CARPI RADIALIS;  Surgeon: Hieu Salamanca M.D.;  Location: SURGERY [facility];  Service: Orthopedics   • PARTIAL MASTECTOMY  4/21/2015    Performed by Anna Licona M.D. at SURGERY SAME DAY [facility]   • LUMPECTOMY  4/21/2015    Performed by Anna Licona M.D. at SURGERY SAME DAY [facility] ORS   • CARPAL TUNNEL RELEASE  2013   • HYSTERECTOMY LAPAROSCOPY  2006   • BREAST BIOPSY  unknown    left benign biopsy   • TONSILLECTOMY       Social History     Social History   • Marital status:      Spouse name: N/A   • Number of children: N/A   • Years of education: N/A     Occupational History   • Not on file.     Social History Main Topics   • Smoking status: Former Smoker     Packs/day: 1.00     Years: 10.00     Types: Cigarettes     Quit date: 1/1/1979   • Smokeless tobacco: Never Used   • Alcohol use Yes      Comment: 2 per week   • Drug use: No   • Sexual  activity: Not on file     Other Topics Concern   • Not on file     Social History Narrative   • No narrative on file     No family history on file.    Review of Systems   Constitutional: Negative for chills, fever, malaise/fatigue and weight loss.   HENT: Negative for nosebleeds.    Eyes:        Negative for icterus   Respiratory: Negative for hemoptysis.    Cardiovascular: Negative.    Gastrointestinal: Negative for blood in stool and melena.        Negative for hematemesis   Genitourinary: Negative for hematuria.   Musculoskeletal: Positive for joint pain (bilateral hip). Negative for falls.   Skin: Negative.    Neurological: Negative for weakness.   Endo/Heme/Allergies: Does not bruise/bleed easily.   Psychiatric/Behavioral: Negative for substance abuse.       A comprehensive 14-point review of systems was negative except as described above.     Labs:      Ref. Range 2/8/2019 13:15   Bun Latest Ref Range: 8 - 22 mg/dL 20   Creatinine Latest Ref Range: 0.50 - 1.40 mg/dL 0.91   GFR If  Latest Ref Range: >60 mL/min/1.73 m 2 >60   GFR If Non  Latest Ref Range: >60 mL/min/1.73 m 2 >60       Radiology:   MRI right hip on February 8, 2019 at [facility]:  1.  No evidence of bone lesion or evidence of arthropathy of the right hip.   2.  Appearance of the proximal femur which has been described in the clinical syndrome of femoro-acetabular impingement.   3.  Heterogeneous high and low T1 and T2 signal mass involving the right gluteus gerson muscle inferiorly immediately adjacent to the gluteus medius muscle muscle and extending adjacent to the posterior aspect of the iliotibial band with serpiginous T2 foci present with heterogeneous enhancement. This measures 10 x 3 x 2 cm in size. Differential diagnosis includes a variety of processes to include hemangioma or lymphangioma. Sarcoma would be considered much less likely possibility. Old trauma with muscle atrophy and hematoma would be a  consideration as well.   4.  Tendinopathy of the gluteus medius and minimus tendons.  The muscle described in impression #3 should read tensor fascia shannon.      MRI left hip February 8, 2019 at [facility]:  1.  Mild degenerative change of the left hip characterized by mild thinning of the acetabular cartilage superiorly. There are also tiny left femoral head osteophytes.  2.  Appearance of the proximal femur which has been described in the clinical syndrome of femoro-acetabular impingement.  3.  Tendinopathy of the gluteus medius and minimus tendons with a tiny amount of fluid within the trochanteric bursa.  4.  No evidence of fracture or metastatic bone lesion.  5.  Degenerative disc disease involving the lumbar spine.    USD right lower extremity February 12, 2019 at [facility]:  The mass in the right gluteus gerson muscle cannot be localized on ultrasound.    DEXA scan February 6, 2019 and [facility]:  According to the World Health Organization classification, bone mineral density of this patient is consistent with osteopenia. Interval decrease in bone mineral density compared to prior.    10-year Probability of Fracture:  Major Osteoporotic     28.8%  Hip     2.0%  Population      [place] ()    Based on left femur neck BMD    Xray lumbar spine January 21, 2019 at [facility]:  Decreased bone mineralization.  Multilevel degenerative disc disease with increased disc space narrowing when compared to previous exam.  Lower lumbar facet joint degenerative changes.    Xray bilateral hip January 21, 2019 at [facility]:  No radiographic evidence of acute traumatic bone injury.  Degenerative changes about both hips and in the lumbar spine.  7 mm sclerotic lesion in the medial right femoral head which could represent sclerotic metastatic disease or possibly a bone island.    Current Outpatient Prescriptions   Medication Sig Dispense Refill   • diphenhydrAMINE (BENADRYL) 25 MG Tab Take 25 mg by mouth every day.     • VERAPAMIL HCL PO Take  10 mg by mouth every day.     • fluticasone (FLONASE) 50 MCG/ACT nasal spray Spray 1 Spray in nose as needed.     • PAROXETINE HCL PO Take 7.5 mg by mouth every day.     • omeprazole (PRILOSEC) 20 MG CPDR Take 20 mg by mouth every day.       No current facility-administered medications for this encounter.        Allergies   Allergen Reactions   • Sulfa Drugs Hives     Physical Exam   Constitutional: She is oriented to person, place, and time and well-developed, well-nourished, and in no distress. No distress.   HENT:   Head: Normocephalic.   Eyes: Pupils are equal, round, and reactive to light. No scleral icterus.   Cardiovascular: Normal rate, regular rhythm and normal heart sounds.    Pulmonary/Chest: Effort normal and breath sounds normal. No respiratory distress. She has no wheezes. She has no rales.   Abdominal: Soft.   Musculoskeletal: Normal range of motion. She exhibits tenderness (right hip). She exhibits no deformity.   Neurological: She is alert and oriented to person, place, and time. She has normal sensation and normal strength. She is not agitated and not disoriented. She displays no weakness, no tremor, facial symmetry, normal stance and normal speech. No cranial nerve deficit. Gait normal. Coordination and gait normal.   Skin: Skin is warm and dry. No rash noted. She is not diaphoretic. No erythema. No pallor.   Psychiatric: Mood, memory, affect and judgment normal.     Impression:   1. Right hip hemangioma versus arteriovenous malformation, amenable to percutaneous sclerotherapy.  2. Right hip pain.  3. Hypertension.  4. History of breast cancer.   5. Arthritis left hip.    Plan:   Santana Huber MD has reviewed Ms.OBrien Hawthorne's history and imaging studies, examined the patient, and discussed treatment options. Ms.OBrien Hawthorne has a painful right hip vascular malformation that is almost certainly benign. It does not pose a life threatening bleeding risk. It appears amenable to  percutaneous sclerotherapy. This may require more than one session to completely treat. We discussed the method of the procedure at length including the use of anesthesia services for the procedure. We additionally discussed the procedure risks, including bleeding and infection, damage to the blood vessels or adjacent structures, reaction to any medications given during the procedure, side effects of contrast, and procedural radiation exposure. There is a chance the malformation may ultimately not be amenable to percutaneous intervention and may ultimately require surgical excision. After the procedure, there is a chance that the malformation could recur. We discussed alternatives to the procedure including surveillance and the patient has discussed surgical excision with her orthopedic surgeon. The patient verbalizes understanding of the plan and elects to proceed. Written pre- and post- procedure care instructions were provided. She has been scheduled March 28.      AICHA Acevedo with Santana Huber MD  Neuro Interventional Service   [place] [id number] [facility] ([id number])  [place] [address]  [phone]

## 2019-03-13 DIAGNOSIS — Z01.812 PRE-PROCEDURAL LABORATORY EXAMINATION: ICD-10-CM

## 2019-03-13 DIAGNOSIS — Z01.810 PRE-OPERATIVE CARDIOVASCULAR EXAMINATION: ICD-10-CM

## 2019-03-13 LAB
ANION GAP SERPL CALC-SCNC: 5 MMOL/L (ref 0–11.9)
BASOPHILS # BLD AUTO: 0.3 % (ref 0–1.8)
BASOPHILS # BLD: 0.01 K/UL (ref 0–0.12)
BUN SERPL-MCNC: 19 MG/DL (ref 8–22)
CALCIUM SERPL-MCNC: 10.2 MG/DL (ref 8.5–10.5)
CHLORIDE SERPL-SCNC: 105 MMOL/L (ref 96–112)
CO2 SERPL-SCNC: 28 MMOL/L (ref 20–33)
CREAT SERPL-MCNC: 0.98 MG/DL (ref 0.5–1.4)
EKG IMPRESSION: NORMAL
EOSINOPHIL # BLD AUTO: 0.03 K/UL (ref 0–0.51)
EOSINOPHIL NFR BLD: 0.8 % (ref 0–6.9)
ERYTHROCYTE [DISTWIDTH] IN BLOOD BY AUTOMATED COUNT: 41 FL (ref 35.9–50)
GLUCOSE SERPL-MCNC: 109 MG/DL (ref 65–99)
HCT VFR BLD AUTO: 43.6 % (ref 37–47)
HGB BLD-MCNC: 14.4 G/DL (ref 12–16)
IMM GRANULOCYTES # BLD AUTO: 0.01 K/UL (ref 0–0.11)
IMM GRANULOCYTES NFR BLD AUTO: 0.3 % (ref 0–0.9)
INR PPP: 0.92 (ref 0.87–1.13)
LYMPHOCYTES # BLD AUTO: 1.06 K/UL (ref 1–4.8)
LYMPHOCYTES NFR BLD: 26.8 % (ref 22–41)
MCH RBC QN AUTO: 31 PG (ref 27–33)
MCHC RBC AUTO-ENTMCNC: 33 G/DL (ref 33.6–35)
MCV RBC AUTO: 93.8 FL (ref 81.4–97.8)
MONOCYTES # BLD AUTO: 0.28 K/UL (ref 0–0.85)
MONOCYTES NFR BLD AUTO: 7.1 % (ref 0–13.4)
NEUTROPHILS # BLD AUTO: 2.57 K/UL (ref 2–7.15)
NEUTROPHILS NFR BLD: 64.7 % (ref 44–72)
NRBC # BLD AUTO: 0 K/UL
NRBC BLD-RTO: 0 /100 WBC
PLATELET # BLD AUTO: 184 K/UL (ref 164–446)
PMV BLD AUTO: 9.9 FL (ref 9–12.9)
POTASSIUM SERPL-SCNC: 4.1 MMOL/L (ref 3.6–5.5)
PROTHROMBIN TIME: 12.4 SEC (ref 12–14.6)
RBC # BLD AUTO: 4.65 M/UL (ref 4.2–5.4)
SODIUM SERPL-SCNC: 138 MMOL/L (ref 135–145)
WBC # BLD AUTO: 4 K/UL (ref 4.8–10.8)

## 2019-03-13 PROCEDURE — 80048 BASIC METABOLIC PNL TOTAL CA: CPT

## 2019-03-13 PROCEDURE — 85025 COMPLETE CBC W/AUTO DIFF WBC: CPT

## 2019-03-13 PROCEDURE — 93010 ELECTROCARDIOGRAM REPORT: CPT | Performed by: INTERNAL MEDICINE

## 2019-03-13 PROCEDURE — 93005 ELECTROCARDIOGRAM TRACING: CPT

## 2019-03-13 PROCEDURE — 36415 COLL VENOUS BLD VENIPUNCTURE: CPT

## 2019-03-13 PROCEDURE — 85610 PROTHROMBIN TIME: CPT

## 2019-03-28 ENCOUNTER — ANESTHESIA EVENT (OUTPATIENT)
Dept: RADIOLOGY | Facility: MEDICAL CENTER | Age: 64
End: 2019-03-28
Payer: MEDICARE

## 2019-03-28 ENCOUNTER — HOSPITAL ENCOUNTER (OUTPATIENT)
Facility: MEDICAL CENTER | Age: 64
End: 2019-03-28
Attending: RADIOLOGY | Admitting: RADIOLOGY
Payer: MEDICARE

## 2019-03-28 ENCOUNTER — ANESTHESIA (OUTPATIENT)
Dept: RADIOLOGY | Facility: MEDICAL CENTER | Age: 64
End: 2019-03-28
Payer: MEDICARE

## 2019-03-28 ENCOUNTER — APPOINTMENT (OUTPATIENT)
Dept: RADIOLOGY | Facility: MEDICAL CENTER | Age: 64
End: 2019-03-28
Attending: RADIOLOGY
Payer: MEDICARE

## 2019-03-28 VITALS
OXYGEN SATURATION: 94 % | DIASTOLIC BLOOD PRESSURE: 76 MMHG | HEART RATE: 67 BPM | TEMPERATURE: 97.6 F | RESPIRATION RATE: 18 BRPM | WEIGHT: 143.08 LBS | SYSTOLIC BLOOD PRESSURE: 119 MMHG | BODY MASS INDEX: 27.01 KG/M2 | HEIGHT: 61 IN

## 2019-03-28 DIAGNOSIS — D18.00 INTRAMUSCULAR HEMANGIOMA: ICD-10-CM

## 2019-03-28 DIAGNOSIS — M25.551 PAIN IN RIGHT HIP: ICD-10-CM

## 2019-03-28 LAB — PATHOLOGY CONSULT NOTE: NORMAL

## 2019-03-28 PROCEDURE — 700111 HCHG RX REV CODE 636 W/ 250 OVERRIDE (IP)

## 2019-03-28 PROCEDURE — 700101 HCHG RX REV CODE 250: Performed by: RADIOLOGY

## 2019-03-28 PROCEDURE — 160002 HCHG RECOVERY MINUTES (STAT)

## 2019-03-28 PROCEDURE — 700101 HCHG RX REV CODE 250: Performed by: ANESTHESIOLOGY

## 2019-03-28 PROCEDURE — 77012 CT SCAN FOR NEEDLE BIOPSY: CPT

## 2019-03-28 PROCEDURE — 306637 HCHG MISC ORTHO ITEM RC 0274

## 2019-03-28 PROCEDURE — 37799 UNLISTED PX VASCULAR SURGERY: CPT

## 2019-03-28 PROCEDURE — 700117 HCHG RX CONTRAST REV CODE 255: Performed by: RADIOLOGY

## 2019-03-28 PROCEDURE — 88305 TISSUE EXAM BY PATHOLOGIST: CPT

## 2019-03-28 PROCEDURE — 700101 HCHG RX REV CODE 250

## 2019-03-28 PROCEDURE — 700111 HCHG RX REV CODE 636 W/ 250 OVERRIDE (IP): Performed by: ANESTHESIOLOGY

## 2019-03-28 RX ORDER — HALOPERIDOL 5 MG/ML
1 INJECTION INTRAMUSCULAR
Status: DISCONTINUED | OUTPATIENT
Start: 2019-03-28 | End: 2019-03-28 | Stop reason: HOSPADM

## 2019-03-28 RX ORDER — MEPERIDINE HYDROCHLORIDE 25 MG/ML
6.25 INJECTION INTRAMUSCULAR; INTRAVENOUS; SUBCUTANEOUS
Status: DISCONTINUED | OUTPATIENT
Start: 2019-03-28 | End: 2019-03-28 | Stop reason: HOSPADM

## 2019-03-28 RX ORDER — SODIUM CHLORIDE, SODIUM LACTATE, POTASSIUM CHLORIDE, CALCIUM CHLORIDE 600; 310; 30; 20 MG/100ML; MG/100ML; MG/100ML; MG/100ML
INJECTION, SOLUTION INTRAVENOUS CONTINUOUS
Status: DISCONTINUED | OUTPATIENT
Start: 2019-03-28 | End: 2019-03-28 | Stop reason: HOSPADM

## 2019-03-28 RX ORDER — OXYCODONE HYDROCHLORIDE 5 MG/1
5 TABLET ORAL
Status: DISCONTINUED | OUTPATIENT
Start: 2019-03-28 | End: 2019-03-28 | Stop reason: HOSPADM

## 2019-03-28 RX ORDER — LIDOCAINE HYDROCHLORIDE 10 MG/ML
INJECTION, SOLUTION INFILTRATION; PERINEURAL
Status: COMPLETED
Start: 2019-03-28 | End: 2019-03-28

## 2019-03-28 RX ORDER — HYDROMORPHONE HYDROCHLORIDE 2 MG/ML
0.25 INJECTION, SOLUTION INTRAMUSCULAR; INTRAVENOUS; SUBCUTANEOUS
Status: DISCONTINUED | OUTPATIENT
Start: 2019-03-28 | End: 2019-03-28 | Stop reason: HOSPADM

## 2019-03-28 RX ORDER — DEXAMETHASONE SODIUM PHOSPHATE 4 MG/ML
INJECTION, SOLUTION INTRA-ARTICULAR; INTRALESIONAL; INTRAMUSCULAR; INTRAVENOUS; SOFT TISSUE PRN
Status: DISCONTINUED | OUTPATIENT
Start: 2019-03-28 | End: 2019-03-28 | Stop reason: SURG

## 2019-03-28 RX ORDER — HYDROMORPHONE HYDROCHLORIDE 2 MG/ML
0.1 INJECTION, SOLUTION INTRAMUSCULAR; INTRAVENOUS; SUBCUTANEOUS
Status: DISCONTINUED | OUTPATIENT
Start: 2019-03-28 | End: 2019-03-28 | Stop reason: HOSPADM

## 2019-03-28 RX ORDER — MIDAZOLAM HYDROCHLORIDE 1 MG/ML
INJECTION INTRAMUSCULAR; INTRAVENOUS
Status: COMPLETED
Start: 2019-03-28 | End: 2019-03-28

## 2019-03-28 RX ORDER — ONDANSETRON 2 MG/ML
4 INJECTION INTRAMUSCULAR; INTRAVENOUS
Status: DISCONTINUED | OUTPATIENT
Start: 2019-03-28 | End: 2019-03-28 | Stop reason: HOSPADM

## 2019-03-28 RX ORDER — HYDROMORPHONE HYDROCHLORIDE 2 MG/ML
0.2 INJECTION, SOLUTION INTRAMUSCULAR; INTRAVENOUS; SUBCUTANEOUS
Status: DISCONTINUED | OUTPATIENT
Start: 2019-03-28 | End: 2019-03-28 | Stop reason: HOSPADM

## 2019-03-28 RX ORDER — CEFAZOLIN SODIUM 1 G/3ML
INJECTION, POWDER, FOR SOLUTION INTRAMUSCULAR; INTRAVENOUS PRN
Status: DISCONTINUED | OUTPATIENT
Start: 2019-03-28 | End: 2019-03-28 | Stop reason: SURG

## 2019-03-28 RX ORDER — HYDROMORPHONE HYDROCHLORIDE 2 MG/ML
0.4 INJECTION, SOLUTION INTRAMUSCULAR; INTRAVENOUS; SUBCUTANEOUS
Status: DISCONTINUED | OUTPATIENT
Start: 2019-03-28 | End: 2019-03-28 | Stop reason: HOSPADM

## 2019-03-28 RX ORDER — DIPHENHYDRAMINE HYDROCHLORIDE 50 MG/ML
12.5 INJECTION INTRAMUSCULAR; INTRAVENOUS
Status: DISCONTINUED | OUTPATIENT
Start: 2019-03-28 | End: 2019-03-28 | Stop reason: HOSPADM

## 2019-03-28 RX ORDER — OXYCODONE HYDROCHLORIDE 5 MG/1
2.5 TABLET ORAL
Status: DISCONTINUED | OUTPATIENT
Start: 2019-03-28 | End: 2019-03-28 | Stop reason: HOSPADM

## 2019-03-28 RX ORDER — MIDAZOLAM HYDROCHLORIDE 1 MG/ML
1 INJECTION INTRAMUSCULAR; INTRAVENOUS
Status: DISCONTINUED | OUTPATIENT
Start: 2019-03-28 | End: 2019-03-28 | Stop reason: HOSPADM

## 2019-03-28 RX ORDER — OXYCODONE HCL 5 MG/5 ML
5 SOLUTION, ORAL ORAL
Status: DISCONTINUED | OUTPATIENT
Start: 2019-03-28 | End: 2019-03-28 | Stop reason: HOSPADM

## 2019-03-28 RX ORDER — OXYCODONE HCL 5 MG/5 ML
10 SOLUTION, ORAL ORAL
Status: DISCONTINUED | OUTPATIENT
Start: 2019-03-28 | End: 2019-03-28 | Stop reason: HOSPADM

## 2019-03-28 RX ADMIN — FENTANYL CITRATE 50 MCG: 50 INJECTION, SOLUTION INTRAMUSCULAR; INTRAVENOUS at 08:21

## 2019-03-28 RX ADMIN — SODIUM CHLORIDE, SODIUM LACTATE, POTASSIUM CHLORIDE, CALCIUM CHLORIDE: 600; 310; 30; 20 INJECTION, SOLUTION INTRAVENOUS at 08:00

## 2019-03-28 RX ADMIN — SODIUM CHLORIDE, SODIUM LACTATE, POTASSIUM CHLORIDE, CALCIUM CHLORIDE: 600; 310; 30; 20 INJECTION, SOLUTION INTRAVENOUS at 06:57

## 2019-03-28 RX ADMIN — PROPOFOL 200 MG: 10 INJECTION, EMULSION INTRAVENOUS at 08:21

## 2019-03-28 RX ADMIN — LIDOCAINE HYDROCHLORIDE 10 ML: 10 INJECTION, SOLUTION INFILTRATION; PERINEURAL at 08:40

## 2019-03-28 RX ADMIN — CEFAZOLIN 2 G: 330 INJECTION, POWDER, FOR SOLUTION INTRAMUSCULAR; INTRAVENOUS at 08:23

## 2019-03-28 RX ADMIN — DEXAMETHASONE SODIUM PHOSPHATE 8 MG: 4 INJECTION, SOLUTION INTRAMUSCULAR; INTRAVENOUS at 08:25

## 2019-03-28 RX ADMIN — MIDAZOLAM HYDROCHLORIDE 2 MG: 1 INJECTION, SOLUTION INTRAMUSCULAR; INTRAVENOUS at 08:15

## 2019-03-28 RX ADMIN — LIDOCAINE HYDROCHLORIDE 40 MG: 20 INJECTION, SOLUTION INFILTRATION; PERINEURAL at 08:21

## 2019-03-28 RX ADMIN — Medication 10 ML: at 08:40

## 2019-03-28 RX ADMIN — ALCOHOL 10 ML: 0.98 INJECTION INTRASPINAL at 08:50

## 2019-03-28 RX ADMIN — IOHEXOL 5 ML: 300 INJECTION, SOLUTION INTRAVENOUS at 09:09

## 2019-03-28 ASSESSMENT — PAIN SCALES - GENERAL: PAIN_LEVEL: 0

## 2019-03-28 NOTE — ANESTHESIA QCDR
2019 [facility] Clinical Data Registry (for Quality Improvement)     Postoperative nausea/vomiting risk protocol (Adult = 18 yrs and Pediatric 3-17 yrs)- (430 and 463)  General inhalation anesthetic (NOT TIVA) with PONV risk factors: Yes  Provision of anti-emetic therapy with at least 2 different classes of agents: Yes   Patient DID NOT receive anti-emetic therapy and reason is documented in Medical Record:  N/A    Multimodal Pain Management- (AQI59)  Patient undergoing Elective Surgery (i.e. Outpatient, or ASC, or Prescheduled Surgery prior to Hospital Admission): Yes  Use of Multimodal Pain Management, two or more drugs and/or interventions, NOT including systemic opioids: Yes   Exception: Documented allergy to multiple classes of analgesics:  N/A    PACU assessment of acute postoperative pain prior to Anesthesia Care End- Applies to Patients Age = 18- (ABG7)  Initial PACU pain score is which of the following: < 7/10  Patient unable to report pain score: N/A    Post-anesthetic transfer of care checklist/protocol to PACU/ICU- (426 and 427)  Upon conclusion of case, patient transferred to which of the following locations: PACU/Non-ICU  Use of transfer checklist/protocol: Yes  Exclusion: Service Performed in Patient Hospital Room (and thus did not require transfer): N/A    PACU Reintubation- (AQI31)  General anesthesia requiring endotracheal intubation (ETT) along with subsequent extubation in OR or PACU: No  Required reintubation in the PACU: N/A  Extubation was a planned trial documented in the medical record prior to removal of the original airway device: N/A    Unplanned admission to ICU related to anesthesia service up through end of PACU care- (MD51)  Unplanned admission to ICU (not initially anticipated at anesthesia start time): No

## 2019-03-28 NOTE — OR NURSING
0917 patient arrived from IR s/p right hip  Hemangioma biopsy with sclerotherapy of vascular malformation, gauze and tegaderm to right hip, patient awake, not in any distress or discomfort. Vital signs stable.  1040 discharge instructions given to patient, patientand family  verbalize understanding of the orders, copy given to . Discussed activity, medications, worsening symptoms, follow up.   1046 patient escorted via w/c with all her personal belongings.

## 2019-03-28 NOTE — DISCHARGE INSTRUCTIONS
ACTIVITY: Rest and take it easy for the first 24 hours.  A responsible adult is recommended to remain with you during that time.  It is normal to feel sleepy.  We encourage you to not do anything that requires balance, judgment or coordination.    MILD FLU-LIKE SYMPTOMS ARE NORMAL. YOU MAY EXPERIENCE GENERALIZED MUSCLE ACHES, THROAT IRRITATION, HEADACHE AND/OR SOME NAUSEA.    FOR 24 HOURS DO NOT:  Drive, operate machinery or run household appliances.  Drink beer or alcoholic beverages.   Make important decisions or sign legal documents.    SPECIAL INSTRUCTIONS: follow up with primary care physician as needed  If you experience chest pain, shortness of breath call 911 return to ER   Resume your home medications    DIET: To avoid nausea, slowly advance diet as tolerated, avoiding spicy or greasy foods for the first day.  Add more substantial food to your diet according to your physician's instructions.  Babies can be fed formula or breast milk as soon as they are hungry.  INCREASE FLUIDS AND FIBER TO AVOID CONSTIPATION.    SURGICAL DRESSING/BATHING: keep dressing clean dry intact for 24 hours, remove dressing after 24 hours.    FOLLOW-UP APPOINTMENT:  A follow-up appointment should be arranged with your doctor in [phone]; call to schedule.    You should CALL YOUR PHYSICIAN if you develop:  Fever greater than 101 degrees F.  Pain not relieved by medication, or persistent nausea or vomiting.  Excessive bleeding (blood soaking through dressing) or unexpected drainage from the wound.  Extreme redness or swelling around the incision site, drainage of pus or foul smelling drainage.  Inability to urinate or empty your bladder within 8 hours.  Problems with breathing or chest pain.    You should call 911 if you develop problems with breathing or chest pain.  If you are unable to contact your doctor or surgical center, you should go to the nearest emergency room or urgent care center.  Physician's telephone #: [phone]    If  any questions arise, call your doctor.  If your doctor is not available, please feel free to call the Surgical Center at [phone].  The Center is open Monday through Friday from 7AM to 7PM.  You can also call the HEALTH HOTLINE open 24 hours/day, 7 days/week and speak to a nurse at [phone], or toll free at [phone].    A registered nurse may call you a few days after your surgery to see how you are doing after your procedure.    MEDICATIONS: Resume taking daily medication.  Take prescribed pain medication with food.  If no medication is prescribed, you may take non-aspirin pain medication if needed.  PAIN MEDICATION CAN BE VERY CONSTIPATING.  Take a stool softener or laxative such as senokot, pericolace, or milk of magnesia if needed.  Needle Biopsy of the Bone  Introduction  A bone biopsy is a procedure in which a small sample of bone is removed. The sample is taken with a needle. Then, the bone sample is looked at under a microscope to check for abnormalities. The sample is usually taken from a bone that is close to the skin. This procedure may be done to check for various problems with the bone. You may need this procedure if imaging tests or blood tests have indicated a possible problem. This procedure may be done to help determine if a bone tumor is cancerous (malignant). A bone biopsy can help to diagnose problems such as:  · Tumors of the bone (sarcomas) and bone marrow (multiple myeloma).  · Bone that forms abnormally (Paget disease).  · Noncancerous (benign) bone cysts.  · Bony growths.  · Infections in the bone.  Tell a health care provider about:  · Any allergies you have.  · All medicines you are taking, including vitamins, herbs, eye drops, creams, and over-the-counter medicines.  · Any problems you or family members have had with anesthetic medicines.  · Any blood disorders you have.  · Any surgeries you have had.  · Any medical conditions you have.  What are the risks?  Generally,  this is a safe procedure. However, problems may occur, including:  · Excessive bleeding.  · Infection.  · Injury to surrounding tissue.  What happens before the procedure?  · Ask your health care provider about:  ¨ Changing or stopping your regular medicines. This is especially important if you are taking diabetes medicines or blood thinners.  ¨ Taking medicines such as aspirin and ibuprofen. These medicines can thin your blood. Do not take these medicines before your procedure if your health care provider instructs you not to.  · Follow instructions from your health care provider about eating or drinking restrictions.  · Plan to have someone take you home after the procedure.  · If you go home right after the procedure, plan to have someone with you for 24 hours.  What happens during the procedure?  · An IV tube may be inserted into one of your veins.  · The injection site will be cleaned with a germ-killing solution (antiseptic).  · You will be given one or more of the following:  ¨ A medicine to help you relax (sedative).  ¨ A medicine to numb the area (local anesthetic).  · The sample of bone will be removed by putting a large needle through the skin and into the bone.  · The needle will be removed.  · A bandage (dressing) will be placed over the insertion site and taped in place.  The procedure may vary among health care providers and hospitals.  What happens after the procedure?  · Your blood pressure, heart rate, breathing rate, and blood oxygen level will be monitored often until the medicines you were given have worn off.  · Return to your normal activities as told by your health care provider.  This information is not intended to replace advice given to you by your health care provider. Make sure you discuss any questions you have with your health care provider.  Document Released: 10/26/2005 Document Revised: 05/25/2017 Document Reviewed: 01/25/2016  © 2017 Elsevier      If your physician has prescribed  pain medication that includes Acetaminophen (Tylenol), do not take additional Acetaminophen (Tylenol) while taking the prescribed medication.    Depression / Suicide Risk    As you are discharged from this [facility] facility, it is important to learn how to keep safe from harming yourself.    Recognize the warning signs:  · Abrupt changes in personality, positive or negative- including increase in energy   · Giving away possessions  · Change in eating patterns- significant weight changes-  positive or negative  · Change in sleeping patterns- unable to sleep or sleeping all the time   · Unwillingness or inability to communicate  · Depression  · Unusual sadness, discouragement and loneliness  · Talk of wanting to die  · Neglect of personal appearance   · Rebelliousness- reckless behavior  · Withdrawal from people/activities they love  · Confusion- inability to concentrate     If you or a loved one observes any of these behaviors or has concerns about self-harm, here's what you can do:  · Talk about it- your feelings and reasons for harming yourself  · Remove any means that you might use to hurt yourself (examples: pills, rope, extension cords, firearm)  · Get professional help from the community (Mental Health, Substance Abuse, psychological counseling)  · Do not be alone:Call your Safe Contact- someone whom you trust who will be there for you.  · Call your local CRISIS HOTLINE 090-7434 or 766-948-2965  · Call your local Children's Mobile Crisis Response Team [place] (583) 734-5466 or www.Weplay  · Call the toll free National Suicide Prevention Hotlines   · National Suicide Prevention Lifeline 493-582-FTOQ (4030)  · National Hope Line Network 800-SUICIDE (644-5881)

## 2019-03-28 NOTE — ANESTHESIA PREPROCEDURE EVALUATION
Relevant Problems   No relevant active problems       Physical Exam    Airway   Mallampati: II  TM distance: >3 FB  Neck ROM: full       Cardiovascular - normal exam  Rhythm: regular  Rate: normal  (-) murmur     Dental - normal exam         Pulmonary - normal exam  Breath sounds clear to auscultation     Abdominal - normal exam     Neurological - normal exam                 Anesthesia Plan    ASA 2       Plan - general       Airway plan will be LMA      Plan Factors:   Patient did not smoke on day of procedure.    Induction: intravenous    Postoperative Plan: Postoperative administration of opioids is intended.    Pertinent diagnostic labs and testing reviewed    Informed Consent:    Anesthetic plan and risks discussed with patient.    Use of blood products discussed with: patient whom consented to blood products.

## 2019-03-28 NOTE — ANESTHESIA TIME REPORT
Anesthesia Start and Stop Event Times     Date Time Event    3/28/2019 0815 Anesthesia Start     0917 Anesthesia Stop        Responsible Staff  03/28/19    Name Role Begin End    Jacqueline Daily M.D. Anesth 0815 0917        Preop Diagnosis (Free Text):  Pre-op Diagnosis             Preop Diagnosis (Codes):    Post op Diagnosis  Hemangioma      Premium Reason  Non-Premium    Comments:

## 2019-03-28 NOTE — OR SURGEON
Immediate Post- Operative Note        PostOp Diagnosis: Vascular malformation of the right thigh    Procedure(s): Sclerothrphy      Estimated Blood Loss: Less than 5 ml        Complications: None            3/28/2019     9:04 AM     Santana Huber

## 2019-03-28 NOTE — PROGRESS NOTES
IR Procedure RN's Note:    Patient consented by Dr. Gonzalez and Dr. Daily prior to start of procedure; pt and both MDs signed consent and placed in chart; Lluvia ANTONY RN verified.    RIGHT hip hemangioma biopsy with sclerotherapy of vascular malformation done by Dr. Gonzalez; RIGHT hip access site; pt assessed upon arrival, pt A/Ox4, pt aware of the procedure; x2 cores obtained and sent to pathology, 10mL of absolute alcohol injected in the the vascular malformation by Dr. Gonzalez;intervention performed; pt appears comfortable throughout the procedure with no signs of distress or discomfort; Dr. Daily provided GA and monitored the patient throughout the procedure; access site CDI, soft with no signs of hematoma or bleeding, gauze and tegaderm for dressing; telephone report given to David; pt is lethargic and on 3L O2; pt transported to PPU.

## 2019-03-28 NOTE — ANESTHESIA PROCEDURE NOTES
Airway  Date/Time: 3/28/2019 8:25 AM  Performed by: GEMINI LUDWIG  Authorized by: GEMINI LUDWIG     Patient location: CT4.  Urgency:  Elective  Difficult Airway: No    Indications for Airway Management:  Anesthesia  Spontaneous Ventilation: present    Sedation Level:  Deep  Preoxygenated: Yes    Patient Position:  Sniffing  MILS Maintained Throughout: Yes    Mask Difficulty Assessment:  1 - vent by mask  Final Airway Type:  Supraglottic airway  Final Supraglottic Airway:  Standard LMA  SGA Size:  4  Number of Attempts at Approach:  1  Number of Other Approaches Attempted:  0

## 2019-03-29 DIAGNOSIS — Q79.8: ICD-10-CM

## 2019-04-01 NOTE — PROGRESS NOTES
Pathology reviewed showing no evidence of malignancy. Results called to pt. Rt hip slightly worse but she thinks it will improve. She is to f/u in IR clinic for pain eval.

## 2019-04-10 ENCOUNTER — HOSPITAL ENCOUNTER (OUTPATIENT)
Dept: LAB | Facility: MEDICAL CENTER | Age: 64
End: 2019-04-10
Attending: FAMILY MEDICINE
Payer: MEDICARE

## 2019-04-10 LAB
ALBUMIN SERPL BCP-MCNC: 4.2 G/DL (ref 3.2–4.9)
ALBUMIN/GLOB SERPL: 1.7 G/DL
ALP SERPL-CCNC: 75 U/L (ref 30–99)
ALT SERPL-CCNC: 17 U/L (ref 2–50)
ANION GAP SERPL CALC-SCNC: 8 MMOL/L (ref 0–11.9)
APPEARANCE UR: CLEAR
AST SERPL-CCNC: 23 U/L (ref 12–45)
BACTERIA #/AREA URNS HPF: ABNORMAL /HPF
BASOPHILS # BLD AUTO: 0.3 % (ref 0–1.8)
BASOPHILS # BLD: 0.01 K/UL (ref 0–0.12)
BILIRUB SERPL-MCNC: 0.8 MG/DL (ref 0.1–1.5)
BILIRUB UR QL STRIP.AUTO: NEGATIVE
BUN SERPL-MCNC: 15 MG/DL (ref 8–22)
CALCIUM SERPL-MCNC: 9.3 MG/DL (ref 8.5–10.5)
CHLORIDE SERPL-SCNC: 108 MMOL/L (ref 96–112)
CHOLEST SERPL-MCNC: 288 MG/DL (ref 100–199)
CO2 SERPL-SCNC: 26 MMOL/L (ref 20–33)
COLOR UR: YELLOW
CREAT SERPL-MCNC: 0.85 MG/DL (ref 0.5–1.4)
EOSINOPHIL # BLD AUTO: 0.02 K/UL (ref 0–0.51)
EOSINOPHIL NFR BLD: 0.7 % (ref 0–6.9)
EPI CELLS #/AREA URNS HPF: ABNORMAL /HPF
ERYTHROCYTE [DISTWIDTH] IN BLOOD BY AUTOMATED COUNT: 45.1 FL (ref 35.9–50)
EST. AVERAGE GLUCOSE BLD GHB EST-MCNC: 114 MG/DL
GLOBULIN SER CALC-MCNC: 2.5 G/DL (ref 1.9–3.5)
GLUCOSE SERPL-MCNC: 84 MG/DL (ref 65–99)
GLUCOSE UR STRIP.AUTO-MCNC: NEGATIVE MG/DL
HBA1C MFR BLD: 5.6 % (ref 0–5.6)
HCT VFR BLD AUTO: 41.5 % (ref 37–47)
HDLC SERPL-MCNC: 68 MG/DL
HGB BLD-MCNC: 13.6 G/DL (ref 12–16)
IMM GRANULOCYTES # BLD AUTO: 0.01 K/UL (ref 0–0.11)
IMM GRANULOCYTES NFR BLD AUTO: 0.3 % (ref 0–0.9)
KETONES UR STRIP.AUTO-MCNC: NEGATIVE MG/DL
LDLC SERPL CALC-MCNC: 192 MG/DL
LEUKOCYTE ESTERASE UR QL STRIP.AUTO: ABNORMAL
LYMPHOCYTES # BLD AUTO: 0.7 K/UL (ref 1–4.8)
LYMPHOCYTES NFR BLD: 23.7 % (ref 22–41)
MCH RBC QN AUTO: 31.2 PG (ref 27–33)
MCHC RBC AUTO-ENTMCNC: 32.8 G/DL (ref 33.6–35)
MCV RBC AUTO: 95.2 FL (ref 81.4–97.8)
MICRO URNS: ABNORMAL
MONOCYTES # BLD AUTO: 0.19 K/UL (ref 0–0.85)
MONOCYTES NFR BLD AUTO: 6.4 % (ref 0–13.4)
MUCOUS THREADS #/AREA URNS HPF: ABNORMAL /HPF
NEUTROPHILS # BLD AUTO: 2.02 K/UL (ref 2–7.15)
NEUTROPHILS NFR BLD: 68.6 % (ref 44–72)
NITRITE UR QL STRIP.AUTO: NEGATIVE
NRBC # BLD AUTO: 0 K/UL
NRBC BLD-RTO: 0 /100 WBC
PH UR STRIP.AUTO: 6 [PH]
PLATELET # BLD AUTO: 211 K/UL (ref 164–446)
PMV BLD AUTO: 10.1 FL (ref 9–12.9)
POTASSIUM SERPL-SCNC: 4.2 MMOL/L (ref 3.6–5.5)
PROT SERPL-MCNC: 6.7 G/DL (ref 6–8.2)
PROT UR QL STRIP: NEGATIVE MG/DL
RBC # BLD AUTO: 4.36 M/UL (ref 4.2–5.4)
RBC # URNS HPF: ABNORMAL /HPF
RBC UR QL AUTO: NEGATIVE
RENAL EPI CELLS #/AREA URNS HPF: ABNORMAL /HPF
SODIUM SERPL-SCNC: 142 MMOL/L (ref 135–145)
SP GR UR STRIP.AUTO: 1.02
TRIGL SERPL-MCNC: 142 MG/DL (ref 0–149)
UROBILINOGEN UR STRIP.AUTO-MCNC: 0.2 MG/DL
WBC # BLD AUTO: 3 K/UL (ref 4.8–10.8)
WBC #/AREA URNS HPF: ABNORMAL /HPF

## 2019-04-10 PROCEDURE — 80061 LIPID PANEL: CPT

## 2019-04-10 PROCEDURE — 80053 COMPREHEN METABOLIC PANEL: CPT

## 2019-04-10 PROCEDURE — 81001 URINALYSIS AUTO W/SCOPE: CPT

## 2019-04-10 PROCEDURE — 85025 COMPLETE CBC W/AUTO DIFF WBC: CPT

## 2019-04-10 PROCEDURE — 36415 COLL VENOUS BLD VENIPUNCTURE: CPT

## 2019-04-10 PROCEDURE — 83036 HEMOGLOBIN GLYCOSYLATED A1C: CPT | Mod: GA

## 2019-04-17 NOTE — CONSULTS
Neuro Interventional Service Follow Up     Re: Haley Hawthorne     MRN: [id number]   : 1955    Haley Hawthorne was seen today in follow up after hemangioma intervention performed by Santana Huber MD at [facility] on .  She was referred to our service by Madi Schmidt MD and is also under the care of Rose Sanchez MD.    History of Present Illness:  Ms.OBrien Hawthorne has a 1 year history history of atraumatic right hip pain that is worse when she sleeps on it at night. She has had progression of left hip pain that was manageable until the right hip pain worsened. 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 She was evaluated for excision and the recommendation was to pursue sclerotherapy as primary intervention. She was subsequently referred to the Neurointerventional Service for evaluation and management of the malformation. Dr. Huber performed uncomplicated biopsy and sclerotherapy of the right hip hemangioma on . The patient's clinical course was unremarkable. She was discharged from [facility] on the same day. A follow up call to relay the pathology results on  was performed and her right hip pain was reported as slightly worse immediately post procedure. She is seen today for evaluation after the procedure. Today, the patient complains of increasingly worse nonradiating right hip pain lateral to and posterior to the joint. It is 10/10 in the morning and does not improve much throughout the day. She has additional left hip pain that radiates to her left thigh and it is difficult to sleep due to this pain. This complaint was present prior to the procedure but has worsened significantly. In addition, it feels like she cannot stand upright due to the pain and she feels it is impacting how she walks. She has a left hip surgical evaluation planned. She denies swelling,  discharge, or bleeding at the injection site. She denies having vascular malformations elsewhere in the body and denies any history of GI bleeding, epistaxis, or hemoptysis. She has no known family history of vascular malformations. Additional clinical history includes hypertension and breast carcinoma treated with radiation therapy. She reports her health has been very good and most of her complaints are orthopedic in nature. She has an upcoming trip May 2.       Past Medical History:   Diagnosis Date   • Arrhythmia     sinus bradycardia   • Arthritis     hands/ hip-osteo   • Breast cancer (HCC)    • Cancer (HCC) 2015    dcis/ right breast, radiation   • Heart burn    • Hypertension    • Indigestion    • Pain 06/2018    hands     Past Surgical History:   Procedure Laterality Date   • GANGLION EXCISION Left 9/25/2018    Procedure: GANGLION EXCISION-  CYST;  Surgeon: Hieu Salamanca M.D.;  Location: [facility];  Service: Orthopedics   • FINGER ARTHROPLASTY Left 9/25/2018    Procedure: FINGER ARTHROPLASTY- CARPOMETACARPAL;  Surgeon: Hieu Salamanca M.D.;  Location: [facility];  Service: Orthopedics   • FLEXOR TENDON REPAIR Left 9/25/2018    Procedure: FLEXOR TENDON REPAIR-  CARPI RADIALIS;  Surgeon: Hieu Salamanca M.D.;  Location: [facility];  Service: Orthopedics   • FINGER ARTHROPLASTY Right 6/5/2018    Procedure: FINGER ARTHROPLASTY - CARPOMETACARPAL;  Surgeon: Hieu Salamanca M.D.;  Location: [facility];  Service: Orthopedics   • TENDON TRANSFER  6/5/2018    Procedure: TENDON TRANSFER - FLEXOR CARPI RADIALIS;  Surgeon: Hieu Salamanca M.D.;  Location: [facility];  Service: Orthopedics   • PARTIAL MASTECTOMY  4/21/2015    Performed by Anna Licona M.D. at SURGERY SAME DAY [facility]   • LUMPECTOMY  4/21/2015    Performed by Anna Licona M.D. at SURGERY SAME DAY [facility]   • CARPAL TUNNEL RELEASE  2013   • HYSTERECTOMY LAPAROSCOPY   2006   • BREAST BIOPSY  unknown    left benign biopsy   • TONSILLECTOMY       Social History     Social History   • Marital status:      Spouse name: N/A   • Number of children: N/A   • Years of education: N/A     Occupational History   • Not on file.     Social History Main Topics   • Smoking status: Former Smoker     Packs/day: 1.00     Years: 10.00     Types: Cigarettes     Quit date: 1/1/1979   • Smokeless tobacco: Never Used   • Alcohol use Yes      Comment: 2 per week   • Drug use: No   • Sexual activity: Not on file     Other Topics Concern   • Not on file     Social History Narrative   • No narrative on file     No family history on file.    Review of Systems   Constitutional: Positive for malaise/fatigue. Negative for fever.   Musculoskeletal: Positive for joint pain (right and left hips).   Neurological: Positive for sensory change (Left lower extremity). Negative for focal weakness and weakness.   Psychiatric/Behavioral: The patient has insomnia (due to hip pain).      A comprehensive 14-point review of systems was negative except as described above.     Labs:      Ref. Range 4/10/2019 09:08   WBC Latest Ref Range: 4.8 - 10.8 K/uL 3.0 (L)   RBC Latest Ref Range: 4.20 - 5.40 M/uL 4.36   Hemoglobin Latest Ref Range: 12.0 - 16.0 g/dL 13.6   Hematocrit Latest Ref Range: 37.0 - 47.0 % 41.5   MCV Latest Ref Range: 81.4 - 97.8 fL 95.2   MCH Latest Ref Range: 27.0 - 33.0 pg 31.2   MCHC Latest Ref Range: 33.6 - 35.0 g/dL 32.8 (L)   RDW Latest Ref Range: 35.9 - 50.0 fL 45.1   Platelet Count Latest Ref Range: 164 - 446 K/uL 211   MPV Latest Ref Range: 9.0 - 12.9 fL 10.1   Neutrophils-Polys Latest Ref Range: 44.00 - 72.00 % 68.60   Neutrophils (Absolute) Latest Ref Range: 2.00 - 7.15 K/uL 2.02   Lymphocytes Latest Ref Range: 22.00 - 41.00 % 23.70   Lymphs (Absolute) Latest Ref Range: 1.00 - 4.80 K/uL 0.70 (L)   Monocytes Latest Ref Range: 0.00 - 13.40 % 6.40   Monos (Absolute) Latest Ref Range: 0.00 - 0.85  K/uL 0.19   Eosinophils Latest Ref Range: 0.00 - 6.90 % 0.70   Eos (Absolute) Latest Ref Range: 0.00 - 0.51 K/uL 0.02   Basophils Latest Ref Range: 0.00 - 1.80 % 0.30   Baso (Absolute) Latest Ref Range: 0.00 - 0.12 K/uL 0.01   Immature Granulocytes Latest Ref Range: 0.00 - 0.90 % 0.30   Immature Granulocytes (abs) Latest Ref Range: 0.00 - 0.11 K/uL 0.01   Nucleated RBC Latest Units: /100 WBC 0.00   NRBC (Absolute) Latest Units: K/uL 0.00   Sodium Latest Ref Range: 135 - 145 mmol/L 142   Potassium Latest Ref Range: 3.6 - 5.5 mmol/L 4.2   Chloride Latest Ref Range: 96 - 112 mmol/L 108   Co2 Latest Ref Range: 20 - 33 mmol/L 26   Anion Gap Latest Ref Range: 0.0 - 11.9  8.0   Glucose Latest Ref Range: 65 - 99 mg/dL 84   Bun Latest Ref Range: 8 - 22 mg/dL 15   Creatinine Latest Ref Range: 0.50 - 1.40 mg/dL 0.85   GFR If  Latest Ref Range: >60 mL/min/1.73 m 2 >60   GFR If Non  Latest Ref Range: >60 mL/min/1.73 m 2 >60   Calcium Latest Ref Range: 8.5 - 10.5 mg/dL 9.3   AST(SGOT) Latest Ref Range: 12 - 45 U/L 23   ALT(SGPT) Latest Ref Range: 2 - 50 U/L 17   Alkaline Phosphatase Latest Ref Range: 30 - 99 U/L 75   Total Bilirubin Latest Ref Range: 0.1 - 1.5 mg/dL 0.8   Albumin Latest Ref Range: 3.2 - 4.9 g/dL 4.2   Total Protein Latest Ref Range: 6.0 - 8.2 g/dL 6.7   Globulin Latest Ref Range: 1.9 - 3.5 g/dL 2.5   A-G Ratio Latest Units: g/dL 1.7   Glycohemoglobin Latest Ref Range: 0.0 - 5.6 % 5.6   Estim. Avg Glu Latest Units: mg/dL 114   Cholesterol,Tot Latest Ref Range: 100 - 199 mg/dL 288 (H)   Triglycerides Latest Ref Range: 0 - 149 mg/dL 142   HDL Latest Ref Range: >=40 mg/dL 68   LDL Latest Ref Range: <100 mg/dL 192 (H)   Urobilinogen, Urine Latest Ref Range: Negative  0.2   Color Unknown Yellow   Character Unknown Clear   Specific Gravity Latest Ref Range: <1.035  1.022   Ph Latest Ref Range: 5.0 - 8.0  6.0   Glucose Latest Ref Range: Negative mg/dL Negative   Ketones Latest Ref Range:  Negative mg/dL Negative   Bilirubin Latest Ref Range: Negative  Negative   Occult Blood Latest Ref Range: Negative  Negative   Protein Latest Ref Range: Negative mg/dL Negative   Nitrite Latest Ref Range: Negative  Negative   Leukocyte Esterase Latest Ref Range: Negative  Trace (A)   Micro Urine Req Unknown Microscopic   WBC Latest Units: /hpf 0-2   RBC Latest Units: /hpf 0-2   Epithelial Cells Latest Units: /hpf Few   Epithelial Cells Renal Latest Units: /hpf Few   Bacteria Latest Ref Range: None /hpf Few (A)   Mucous Threads Latest Units: /hpf Many     Pathology:  March 28, 2019 at [facility]:  A. Right hip hemangioma biopsy cores:         Core biopsies demonstrating predominately skeletal muscle and          scant adipose and fibroconnective tissue with only a rare blood          vessel noted.         No malignancy identfied.    Radiology:   Interventional radiology procedure March 28, 2019 at [facility]:  1.  Biopsy of presumed right thigh vascular malformation  2.  Successful injection of absolute alcohol for the sclerotherapy.      MRI right hip on February 8, 2019 at [facility]:  1.  No evidence of bone lesion or evidence of arthropathy of the right hip.   2.  Appearance of the proximal femur which has been described in the clinical syndrome of femoro-acetabular impingement.   3.  Heterogeneous high and low T1 and T2 signal mass involving the right gluteus gerson muscle inferiorly immediately adjacent to the gluteus medius muscle muscle and extending adjacent to the posterior aspect of the iliotibial band with serpiginous T2 foci present with heterogeneous enhancement. This measures 10 x 3 x 2 cm in size. Differential diagnosis includes a variety of processes to include hemangioma or lymphangioma. Sarcoma would be considered much less likely possibility. Old trauma with muscle atrophy and hematoma would be a consideration as well.   4.  Tendinopathy of the gluteus medius and minimus tendons.  The muscle described in  impression #3 should read tensor fascia shannon.       MRI left hip February 8, 2019 at [facility]:  1.  Mild degenerative change of the left hip characterized by mild thinning of the acetabular cartilage superiorly. There are also tiny left femoral head osteophytes.  2.  Appearance of the proximal femur which has been described in the clinical syndrome of femoro-acetabular impingement.  3.  Tendinopathy of the gluteus medius and minimus tendons with a tiny amount of fluid within the trochanteric bursa.  4.  No evidence of fracture or metastatic bone lesion.  5.  Degenerative disc disease involving the lumbar spine.     USD right lower extremity February 12, 2019 at [facility]:  The mass in the right gluteus gerson muscle cannot be localized on ultrasound.     DEXA scan February 6, 2019 and [facility]:  According to the World Health Organization classification, bone mineral density of this patient is consistent with osteopenia. Interval decrease in bone mineral density compared to prior.    10-year Probability of Fracture:  Major Osteoporotic     28.8%  Hip     2.0%  Population      [place] ()    Based on left femur neck BMD     Xray lumbar spine January 21, 2019 at [facility]:  Decreased bone mineralization.  Multilevel degenerative disc disease with increased disc space narrowing when compared to previous exam.  Lower lumbar facet joint degenerative changes.     Xray bilateral hip January 21, 2019 at [facility]:  No radiographic evidence of acute traumatic bone injury.  Degenerative changes about both hips and in the lumbar spine.  7 mm sclerotic lesion in the medial right femoral head which could represent sclerotic metastatic disease or possibly a bone island.    Current Outpatient Prescriptions   Medication Sig Dispense Refill   • Cholecalciferol (VITAMIN D3) 5000 units Tab Take 2 Tabs by mouth every day.     • diphenhydrAMINE (BENADRYL) 25 MG Tab Take 25 mg by mouth at bedtime as needed.     • VERAPAMIL HCL PO Take 10 mg by  mouth every day.     • fluticasone (FLONASE) 50 MCG/ACT nasal spray Spray 1 Spray in nose as needed.     • PAROXETINE HCL PO Take 7.5 mg by mouth every day.     • omeprazole (PRILOSEC) 20 MG CPDR Take 20 mg by mouth every day.       No current facility-administered medications for this encounter.        Allergies   Allergen Reactions   • Sulfa Drugs Hives       Physical Exam   Constitutional: She is oriented to person, place, and time and well-developed, well-nourished, and in no distress. No distress.   HENT:   Head: Normocephalic.   Eyes: Pupils are equal, round, and reactive to light. No scleral icterus.   Cardiovascular: Normal rate, regular rhythm and normal heart sounds.    Pulmonary/Chest: Effort normal and breath sounds normal. No respiratory distress. She has no wheezes. She has no rales.   Abdominal: Soft. Bowel sounds are normal. She exhibits no distension.   No ascites noted   Musculoskeletal: She exhibits no edema or deformity.   Neurological: She is alert and oriented to person, place, and time. She has normal sensation and normal strength. She is not agitated and not disoriented. She displays no weakness, no tremor, facial symmetry, normal stance and normal speech. No cranial nerve deficit. Gait normal. Coordination and gait normal.   Skin: Skin is warm and dry. No rash noted. She is not diaphoretic. No erythema. No pallor.   Right hip injection site WNL. No erythema, swelling, ecchymosis, or hematoma noted.    Psychiatric: Mood, memory, affect and judgment normal.     Impression:   1. Right hip hemangioma, status post percutaneous sclerotherapy.  2. Right hip pain, worsening.  3. Hypertension.  4. History of breast cancer.   5. Arthritis left hip.    Plan:   Santana Huber MD additionally evaluated the patient today. We discussed the method of the procedure and reviewed imaging studies. All questions were answered. The right hip post procedure pain after sclerotherapy is likely due to  inflammatory changes. We will obtain an updated RIGHT hip MRI to evaluate the effect of the sclerotherapy with any residual hemangioma and assess for any potential additional etiology behind her 10/10 pain. The MRI has been scheduled for April 23. We will plan a steroid injection to the site to reduce inflammation pending the results of the MRI.     AICHA Acevedo with Santana Huber MD  Neuro Interventional Service   [address] ([id number])  [place] [address]  [phone]

## 2019-04-18 ENCOUNTER — HOSPITAL ENCOUNTER (OUTPATIENT)
Dept: RADIOLOGY | Facility: MEDICAL CENTER | Age: 64
End: 2019-04-18
Attending: NURSE PRACTITIONER

## 2019-04-18 DIAGNOSIS — Q79.8: ICD-10-CM

## 2019-04-18 ASSESSMENT — ENCOUNTER SYMPTOMS
SENSORY CHANGE: 1
FOCAL WEAKNESS: 0
INSOMNIA: 1
FEVER: 0
WEAKNESS: 0

## 2019-04-23 ENCOUNTER — APPOINTMENT (OUTPATIENT)
Dept: RADIOLOGY | Facility: MEDICAL CENTER | Age: 64
End: 2019-04-23
Attending: NURSE PRACTITIONER
Payer: MEDICARE

## 2019-04-23 DIAGNOSIS — D18.09: ICD-10-CM

## 2019-04-23 PROCEDURE — 700117 HCHG RX CONTRAST REV CODE 255: Performed by: NURSE PRACTITIONER

## 2019-04-23 PROCEDURE — 73723 MRI JOINT LWR EXTR W/O&W/DYE: CPT | Mod: RT

## 2019-04-23 PROCEDURE — A9585 GADOBUTROL INJECTION: HCPCS | Performed by: NURSE PRACTITIONER

## 2019-04-23 RX ORDER — GADOBUTROL 604.72 MG/ML
7.5 INJECTION INTRAVENOUS ONCE
Status: COMPLETED | OUTPATIENT
Start: 2019-04-23 | End: 2019-04-23

## 2019-04-23 RX ADMIN — GADOBUTROL 7.5 ML: 604.72 INJECTION INTRAVENOUS at 16:45

## 2019-04-29 ENCOUNTER — HOSPITAL ENCOUNTER (OUTPATIENT)
Dept: RADIOLOGY | Facility: MEDICAL CENTER | Age: 64
End: 2019-04-29
Attending: RADIOLOGY
Payer: MEDICARE

## 2019-04-29 VITALS
OXYGEN SATURATION: 97 % | DIASTOLIC BLOOD PRESSURE: 74 MMHG | SYSTOLIC BLOOD PRESSURE: 130 MMHG | RESPIRATION RATE: 16 BRPM | HEART RATE: 67 BPM

## 2019-04-29 DIAGNOSIS — M25.551 RIGHT HIP PAIN: ICD-10-CM

## 2019-04-29 PROCEDURE — 700101 HCHG RX REV CODE 250

## 2019-04-29 PROCEDURE — 77012 CT SCAN FOR NEEDLE BIOPSY: CPT | Mod: RT

## 2019-04-29 PROCEDURE — 700111 HCHG RX REV CODE 636 W/ 250 OVERRIDE (IP)

## 2019-04-29 RX ORDER — METHYLPREDNISOLONE ACETATE 80 MG/ML
INJECTION, SUSPENSION INTRA-ARTICULAR; INTRALESIONAL; INTRAMUSCULAR; SOFT TISSUE
Status: COMPLETED
Start: 2019-04-29 | End: 2019-04-29

## 2019-04-29 RX ORDER — BUPIVACAINE HYDROCHLORIDE 5 MG/ML
INJECTION, SOLUTION EPIDURAL; INTRACAUDAL
Status: COMPLETED
Start: 2019-04-29 | End: 2019-04-29

## 2019-04-29 RX ADMIN — METHYLPREDNISOLONE ACETATE 80 MG: 80 INJECTION, SUSPENSION INTRA-ARTICULAR; INTRALESIONAL; INTRAMUSCULAR; SOFT TISSUE at 15:43

## 2019-04-29 RX ADMIN — BUPIVACAINE HYDROCHLORIDE 5 ML: 5 INJECTION, SOLUTION EPIDURAL; INTRACAUDAL; PERINEURAL at 15:43

## 2019-04-29 NOTE — PROGRESS NOTES
IR RN note:    Site Marked and Confirmed with MD, patient and RN pre procedure   Right hip soft tissue steriod injection, non sedation by MD Huber assisted by RT Blayne,Right hip anterior access site;  Injection completed, see MD Report   Right groin sealed with Band-aid   Patient tolerated procedure, hemodynamically stable; pt awake and talking post procedure; patient escorted to [facility] via IR RN for discharge

## 2019-05-30 ENCOUNTER — HOSPITAL ENCOUNTER (OUTPATIENT)
Dept: LAB | Facility: MEDICAL CENTER | Age: 64
End: 2019-05-30
Attending: PHYSICIAN ASSISTANT
Payer: MEDICARE

## 2019-05-30 LAB — HCV AB SER QL: NEGATIVE

## 2019-05-30 PROCEDURE — 86803 HEPATITIS C AB TEST: CPT

## 2019-05-30 PROCEDURE — 36415 COLL VENOUS BLD VENIPUNCTURE: CPT

## 2019-06-03 ENCOUNTER — TELEPHONE (OUTPATIENT)
Dept: CARDIOLOGY | Facility: MEDICAL CENTER | Age: 64
End: 2019-06-03

## 2019-06-03 NOTE — TELEPHONE ENCOUNTER
Records request sent to Dr. Polanco's  Office ([facility] Cardiology) F#:[phone] for NP appointment w/. Confirmation sent to scan.

## 2019-06-04 ENCOUNTER — TELEPHONE (OUTPATIENT)
Dept: CARDIOLOGY | Facility: MEDICAL CENTER | Age: 64
End: 2019-06-04

## 2019-06-26 ENCOUNTER — OFFICE VISIT (OUTPATIENT)
Dept: CARDIOLOGY | Facility: MEDICAL CENTER | Age: 64
End: 2019-06-26
Payer: MEDICARE

## 2019-06-26 ENCOUNTER — TELEPHONE (OUTPATIENT)
Dept: CARDIOLOGY | Facility: MEDICAL CENTER | Age: 64
End: 2019-06-26

## 2019-06-26 VITALS
WEIGHT: 144 LBS | SYSTOLIC BLOOD PRESSURE: 128 MMHG | HEART RATE: 70 BPM | BODY MASS INDEX: 26.5 KG/M2 | DIASTOLIC BLOOD PRESSURE: 82 MMHG | HEIGHT: 62 IN | OXYGEN SATURATION: 99 %

## 2019-06-26 DIAGNOSIS — R00.2 PALPITATIONS: ICD-10-CM

## 2019-06-26 DIAGNOSIS — I10 ESSENTIAL HYPERTENSION, BENIGN: ICD-10-CM

## 2019-06-26 DIAGNOSIS — R06.83 SNORING: ICD-10-CM

## 2019-06-26 DIAGNOSIS — R07.89 OTHER CHEST PAIN: ICD-10-CM

## 2019-06-26 PROCEDURE — 99203 OFFICE O/P NEW LOW 30 MIN: CPT | Performed by: INTERNAL MEDICINE

## 2019-06-26 RX ORDER — ATORVASTATIN CALCIUM 20 MG/1
20 TABLET, FILM COATED ORAL NIGHTLY
COMMUNITY
End: 2020-12-09

## 2019-06-26 RX ORDER — ASPIRIN 81 MG/1
81 TABLET ORAL DAILY
COMMUNITY
End: 2020-05-22

## 2019-06-26 RX ORDER — FLUTICASONE PROPIONATE 44 MCG
2 AEROSOL WITH ADAPTER (GRAM) INHALATION 2 TIMES DAILY PRN
COMMUNITY

## 2019-06-26 RX ORDER — VERAPAMIL HYDROCHLORIDE 240 MG/1
240 TABLET, FILM COATED, EXTENDED RELEASE ORAL DAILY
Qty: 30 TAB | Refills: 6 | Status: SHIPPED | OUTPATIENT
Start: 2019-06-26 | End: 2020-05-11 | Stop reason: SDUPTHER

## 2019-06-26 ASSESSMENT — ENCOUNTER SYMPTOMS
PALPITATIONS: 1
CONSTITUTIONAL NEGATIVE: 1
INSOMNIA: 1
RESPIRATORY NEGATIVE: 1
MUSCULOSKELETAL NEGATIVE: 1
NEUROLOGICAL NEGATIVE: 1
GASTROINTESTINAL NEGATIVE: 1

## 2019-06-26 NOTE — LETTER
[facility]   [address] [place] [address]  [place] [address]  Phone: [phone]  Fax: [phone]              Haley Hawthorne  1955    Encounter Date: 2019    Eduardo Lemus M.D.          PROGRESS NOTE:  Chief Complaint   Patient presents with   • Chest Pain       Subjective:   Haley Hawthorne is a 64 y.o. female who is seen in consultation at the request of Dr. Sanchez today for evaluation of chest pain and palpitations.  She has 2 types of chest pain one is sharp, nonexertional, and starts in the xiphoid region and then radiates around to the back bilaterally.  This lasts only a minute or so and then resolves.  A few days ago, she had a different type of chest pain that was bilateral arm aching that occurred with walking.  There is no history of known myocardial infarction.  Cardiac risk factor profile is positive for hypertension and hyperlipidemia.  She is a non-smoker.  She also has palpitations that are annoying for her.  She had no history of sustained tachyarrhythmia.  She has a history of breast cancer was treated with radiation without chemotherapy.  EKG from  was personally reviewed and my interpretation is that this shows sinus rhythm with left anterior hemiblock and LVH by voltage in aVL.  Family history Brother  of sudden cardiac death at age 67.  Mother had a stroke at age 62 and a heart attack at 65 and  of a stroke at 83.      Past Medical History:   Diagnosis Date   • Arrhythmia     sinus bradycardia   • Arthritis     hands/ hip-osteo   • Breast cancer (HCC)    • Cancer (HCC) 2015    dcis/ right breast, radiation   • Heart burn    • Hypertension    • Indigestion    • Pain 2018    hands     Past Surgical History:   Procedure Laterality Date   • GANGLION EXCISION Left 2018    Procedure: GANGLION EXCISION-  CYST;  Surgeon: Hieu Salamanca M.D.;  Location: SURGERY [facility];  Service:  Orthopedics   • FINGER ARTHROPLASTY Left 9/25/2018    Procedure: FINGER ARTHROPLASTY- CARPOMETACARPAL;  Surgeon: Hieu Salamanca M.D.;  Location: [facility];  Service: Orthopedics   • FLEXOR TENDON REPAIR Left 9/25/2018    Procedure: FLEXOR TENDON REPAIR-  CARPI RADIALIS;  Surgeon: Hieu Salamanca M.D.;  Location: [facility];  Service: Orthopedics   • FINGER ARTHROPLASTY Right 6/5/2018    Procedure: FINGER ARTHROPLASTY - CARPOMETACARPAL;  Surgeon: Hieu Salamanca M.D.;  Location: [facility];  Service: Orthopedics   • TENDON TRANSFER  6/5/2018    Procedure: TENDON TRANSFER - FLEXOR CARPI RADIALIS;  Surgeon: Hieu Salamanca M.D.;  Location: [facility];  Service: Orthopedics   • PARTIAL MASTECTOMY  4/21/2015    Performed by Anna Licona M.D. at SURGERY SAME DAY [facility]   • LUMPECTOMY  4/21/2015    Performed by Anna Licona M.D. at SURGERY SAME DAY [facility]   • CARPAL TUNNEL RELEASE  2013   • HYSTERECTOMY LAPAROSCOPY  2006   • BREAST BIOPSY  unknown    left benign biopsy   • TONSILLECTOMY       History reviewed. No pertinent family history.  Social History     Social History   • Marital status:      Spouse name: N/A   • Number of children: N/A   • Years of education: N/A     Occupational History   • Not on file.     Social History Main Topics   • Smoking status: Former Smoker     Packs/day: 1.00     Years: 10.00     Types: Cigarettes     Quit date: 1/1/1979   • Smokeless tobacco: Never Used   • Alcohol use Yes      Comment: 2 per week   • Drug use: No   • Sexual activity: Not on file     Other Topics Concern   • Not on file     Social History Narrative   • No narrative on file     Allergies   Allergen Reactions   • Sulfa Drugs Hives     Outpatient Encounter Prescriptions as of 6/26/2019   Medication Sig Dispense Refill   • atorvastatin (LIPITOR) 20 MG Tab Take 20 mg by mouth every evening.     • fluticasone (FLOVENT HFA) 44 MCG/ACT Aerosol  "Inhale 2 Puffs by mouth 2 times a day.     • aspirin 81 MG EC tablet Take  by mouth.     • verapamil ER (CALAN-SR) 240 MG Tab CR Take 1 Tab by mouth every day. 30 Tab 6   • PAROXETINE HCL PO Take 7.5 mg by mouth every day.     • omeprazole (PRILOSEC) 20 MG CPDR Take 20 mg by mouth every day.     • Cholecalciferol (VITAMIN D3) 5000 units Tab Take 2 Tabs by mouth every day.     • diphenhydrAMINE (BENADRYL) 25 MG Tab Take 25 mg by mouth at bedtime as needed.     • fluticasone (FLONASE) 50 MCG/ACT nasal spray Spray 1 Spray in nose as needed.     • [DISCONTINUED] VERAPAMIL HCL PO Take 10 mg by mouth every day.       No facility-administered encounter medications on file as of 6/26/2019.      Review of Systems   Constitutional: Negative.    HENT: Negative.    Respiratory: Negative.          notes loud snoring   Cardiovascular: Positive for chest pain and palpitations.   Gastrointestinal: Negative.    Genitourinary:        History of hot flashes   Musculoskeletal: Negative.    Skin: Negative.    Neurological: Negative.    Endo/Heme/Allergies: Negative.    Psychiatric/Behavioral: The patient has insomnia.         Objective:   /82 (BP Location: Left arm, Patient Position: Sitting, BP Cuff Size: Adult)   Pulse 70   Ht 1.575 m (5' 2\")   Wt 65.3 kg (144 lb)   SpO2 99%   BMI 26.34 kg/m²      Physical Exam   Constitutional: She is oriented to person, place, and time. No distress.   HENT:   Head: Normocephalic and atraumatic.   Eyes: Pupils are equal, round, and reactive to light. No scleral icterus.   Neck: No JVD present.   Cardiovascular: Normal rate and regular rhythm.    No murmur heard.  Pulmonary/Chest: No respiratory distress. She has no wheezes.   Abdominal: Soft. She exhibits no distension. There is no tenderness.   Musculoskeletal: She exhibits no edema.   Neurological: She is alert and oriented to person, place, and time.   Skin: Skin is warm.   Psychiatric: She has a normal mood and affect.       "       Assessment:     1. Snoring  OVERNIGHT PULSE OXIMETRY    Lipid Profile    Comp Metabolic Panel   2. Essential hypertension, benign  OVERNIGHT PULSE OXIMETRY    Treadmill Stress    EC-ECHOCARDIOGRAM COMPLETE W/O CONT    Lipid Profile    Comp Metabolic Panel   3. Palpitations  OVERNIGHT PULSE OXIMETRY    Treadmill Stress    EC-ECHOCARDIOGRAM COMPLETE W/O CONT    Lipid Profile    Comp Metabolic Panel   4. Other chest pain         Medical Decision Making:  Today's Assessment / Status / Plan:   Chest pain: Patient has 2 types of chest pain.  She had episode of bilateral arm aching which is worrisome.  A treadmill will be obtained.    Hypertension: Blood pressure by my reading 142 systolic.  She notes that when she started verapamil her palpitations got much better.  Would recommend increasing her verapamil to 240 mg a day.    Snoring: Patient snores very loudly.  Suspect she has sleep apnea.  An overnight oximetry will be obtained.    Hypercholesterolemia.  Recent lab revealed LDL of 192.  She will be due for lab testing in the month when she returns and a lipid profile will be checked.    Plan: Increase verapamil to 240 mg a day.            Stress testing with treadmill.            Echo to evaluate LVH and RV systolic pressures.            Screening overnight oximetry.            Lipid profile before next visit.      Rose Sanchez M.D.  [address] [place] [address] [id number]  [id number]  [place] [address]  VIA Facsimile: [phone]

## 2019-06-26 NOTE — PROGRESS NOTES
Chief Complaint   Patient presents with   • Chest Pain       Subjective:   Haley Hawthorne is a 64 y.o. female who is seen in consultation at the request of Dr. Sanchez today for evaluation of chest pain and palpitations.  She has 2 types of chest pain one is sharp, nonexertional, and starts in the xiphoid region and then radiates around to the back bilaterally.  This lasts only a minute or so and then resolves.  A few days ago, she had a different type of chest pain that was bilateral arm aching that occurred with walking.  There is no history of known myocardial infarction.  Cardiac risk factor profile is positive for hypertension and hyperlipidemia.  She is a non-smoker.  She also has palpitations that are annoying for her.  She had no history of sustained tachyarrhythmia.  She has a history of breast cancer was treated with radiation without chemotherapy.  EKG from  was personally reviewed and my interpretation is that this shows sinus rhythm with left anterior hemiblock and LVH by voltage in aVL.  Family history Brother  of sudden cardiac death at age 67.  Mother had a stroke at age 62 and a heart attack at 65 and  of a stroke at 83.      Past Medical History:   Diagnosis Date   • Arrhythmia     sinus bradycardia   • Arthritis     hands/ hip-osteo   • Breast cancer (HCC)    • Cancer (HCC) 2015    dcis/ right breast, radiation   • Heart burn    • Hypertension    • Indigestion    • Pain 2018    hands     Past Surgical History:   Procedure Laterality Date   • GANGLION EXCISION Left 2018    Procedure: GANGLION EXCISION-  CYST;  Surgeon: Hieu Salamanca M.D.;  Location: [facility];  Service: Orthopedics   • FINGER ARTHROPLASTY Left 2018    Procedure: FINGER ARTHROPLASTY- CARPOMETACARPAL;  Surgeon: Hieu Salamanca M.D.;  Location: [facility];  Service: Orthopedics   • FLEXOR TENDON REPAIR Left 2018    Procedure: FLEXOR TENDON REPAIR-  CARPI  RADIALIS;  Surgeon: Hieu Salamanca M.D.;  Location: SUR[facility]ERY [facility];  Service: Orthopedics   • FINGER ARTHROPLASTY Right 6/5/2018    Procedure: FINGER ARTHROPLASTY - CARPOMETACARPAL;  Surgeon: Hieu Salamanca M.D.;  Location: SURGERY [facility];  Service: Orthopedics   • TENDON TRANSFER  6/5/2018    Procedure: TENDON TRANSFER - FLEXOR CARPI RADIALIS;  Surgeon: Hieu Salamanca M.D.;  Location: SURGERY [facility];  Service: Orthopedics   • PARTIAL MASTECTOMY  4/21/2015    Performed by Anna Licona M.D. at SURGERY [facility] DAY [facility]   • LUMPECTOMY  4/21/2015    Performed by Anna Licona M.D. at SURGERY SAME DAY [facility]   • CARPAL TUNNEL RELEASE  2013   • HYSTERECTOMY LAPAROSCOPY  2006   • BREAST BIOPSY  unknown    left benign biopsy   • TONSILLECTOMY       History reviewed. No pertinent family history.  Social History     Social History   • Marital status:      Spouse name: N/A   • Number of children: N/A   • Years of education: N/A     Occupational History   • Not on file.     Social History Main Topics   • Smoking status: Former Smoker     Packs/day: 1.00     Years: 10.00     Types: Cigarettes     Quit date: 1/1/1979   • Smokeless tobacco: Never Used   • Alcohol use Yes      Comment: 2 per week   • Drug use: No   • Sexual activity: Not on file     Other Topics Concern   • Not on file     Social History Narrative   • No narrative on file     Allergies   Allergen Reactions   • Sulfa Drugs Hives     Outpatient Encounter Prescriptions as of 6/26/2019   Medication Sig Dispense Refill   • atorvastatin (LIPITOR) 20 MG Tab Take 20 mg by mouth every evening.     • fluticasone (FLOVENT HFA) 44 MCG/ACT Aerosol Inhale 2 Puffs by mouth 2 times a day.     • aspirin 81 MG EC tablet Take  by mouth.     • verapamil ER (CALAN-SR) 240 MG Tab CR Take 1 Tab by mouth every day. 30 Tab 6   • PAROXETINE HCL PO Take 7.5 mg by mouth every day.     • omeprazole (PRILOSEC) 20 MG CPDR Take 20 mg by  "mouth every day.     • Cholecalciferol (VITAMIN D3) 5000 units Tab Take 2 Tabs by mouth every day.     • diphenhydrAMINE (BENADRYL) 25 MG Tab Take 25 mg by mouth at bedtime as needed.     • fluticasone (FLONASE) 50 MCG/ACT nasal spray Spray 1 Spray in nose as needed.     • [DISCONTINUED] VERAPAMIL HCL PO Take 10 mg by mouth every day.       No facility-administered encounter medications on file as of 6/26/2019.      Review of Systems   Constitutional: Negative.    HENT: Negative.    Respiratory: Negative.          notes loud snoring   Cardiovascular: Positive for chest pain and palpitations.   Gastrointestinal: Negative.    Genitourinary:        History of hot flashes   Musculoskeletal: Negative.    Skin: Negative.    Neurological: Negative.    Endo/Heme/Allergies: Negative.    Psychiatric/Behavioral: The patient has insomnia.         Objective:   /82 (BP Location: Left arm, Patient Position: Sitting, BP Cuff Size: Adult)   Pulse 70   Ht 1.575 m (5' 2\")   Wt 65.3 kg (144 lb)   SpO2 99%   BMI 26.34 kg/m²     Physical Exam   Constitutional: She is oriented to person, place, and time. No distress.   HENT:   Head: Normocephalic and atraumatic.   Eyes: Pupils are equal, round, and reactive to light. No scleral icterus.   Neck: No JVD present.   Cardiovascular: Normal rate and regular rhythm.    No murmur heard.  Pulmonary/Chest: No respiratory distress. She has no wheezes.   Abdominal: Soft. She exhibits no distension. There is no tenderness.   Musculoskeletal: She exhibits no edema.   Neurological: She is alert and oriented to person, place, and time.   Skin: Skin is warm.   Psychiatric: She has a normal mood and affect.       Assessment:     1. Snoring  OVERNIGHT PULSE OXIMETRY    Lipid Profile    Comp Metabolic Panel   2. Essential hypertension, benign  OVERNIGHT PULSE OXIMETRY    Treadmill Stress    EC-ECHOCARDIOGRAM COMPLETE W/O CONT    Lipid Profile    Comp Metabolic Panel   3. Palpitations  " OVERNIGHT PULSE OXIMETRY    Treadmill Stress    EC-ECHOCARDIOGRAM COMPLETE W/O CONT    Lipid Profile    Comp Metabolic Panel   4. Other chest pain         Medical Decision Making:  Today's Assessment / Status / Plan:   Chest pain: Patient has 2 types of chest pain.  She had episode of bilateral arm aching which is worrisome.  A treadmill will be obtained.    Hypertension: Blood pressure by my reading 142 systolic.  She notes that when she started verapamil her palpitations got much better.  Would recommend increasing her verapamil to 240 mg a day.    Snoring: Patient snores very loudly.  Suspect she has sleep apnea.  An overnight oximetry will be obtained.    Hypercholesterolemia.  Recent lab revealed LDL of 192.  She will be due for lab testing in the month when she returns and a lipid profile will be checked.    Plan: Increase verapamil to 240 mg a day.            Stress testing with treadmill.            Echo to evaluate LVH and RV systolic pressures.            Screening overnight oximetry.            Lipid profile before next visit.

## 2019-06-26 NOTE — TELEPHONE ENCOUNTER
Per Dr. TYSON would like this patient to have a OPO done.    OPO faxed [facility]    Phone: [phone]  Fax: [phone]

## 2019-07-05 ENCOUNTER — TELEPHONE (OUTPATIENT)
Dept: CARDIOLOGY | Facility: MEDICAL CENTER | Age: 64
End: 2019-07-05

## 2019-07-05 DIAGNOSIS — R09.89 ABNORMAL PULMONARY FINDING: ICD-10-CM

## 2019-07-05 NOTE — TELEPHONE ENCOUNTER
----- Message from Eduardo Lemus M.D. sent at 7/5/2019  1:07 PM PDT -----  Failed nocturnal Oxygen study. Needs referral to sleep center

## 2019-07-08 ENCOUNTER — HOSPITAL ENCOUNTER (OUTPATIENT)
Dept: LAB | Facility: MEDICAL CENTER | Age: 64
End: 2019-07-08
Attending: INTERNAL MEDICINE
Payer: MEDICARE

## 2019-07-08 ENCOUNTER — NON-PROVIDER VISIT (OUTPATIENT)
Dept: CARDIOLOGY | Facility: MEDICAL CENTER | Age: 64
End: 2019-07-08
Attending: INTERNAL MEDICINE
Payer: MEDICARE

## 2019-07-08 VITALS
HEART RATE: 57 BPM | BODY MASS INDEX: 26.5 KG/M2 | WEIGHT: 144 LBS | HEIGHT: 62 IN | DIASTOLIC BLOOD PRESSURE: 78 MMHG | SYSTOLIC BLOOD PRESSURE: 131 MMHG | OXYGEN SATURATION: 98 %

## 2019-07-08 DIAGNOSIS — I10 ESSENTIAL HYPERTENSION, BENIGN: ICD-10-CM

## 2019-07-08 DIAGNOSIS — R06.83 SNORING: ICD-10-CM

## 2019-07-08 DIAGNOSIS — R00.2 PALPITATIONS: ICD-10-CM

## 2019-07-08 LAB
ALBUMIN SERPL BCP-MCNC: 4.4 G/DL (ref 3.2–4.9)
ALBUMIN/GLOB SERPL: 1.8 G/DL
ALP SERPL-CCNC: 87 U/L (ref 30–99)
ALT SERPL-CCNC: 29 U/L (ref 2–50)
ANION GAP SERPL CALC-SCNC: 9 MMOL/L (ref 0–11.9)
AST SERPL-CCNC: 32 U/L (ref 12–45)
BILIRUB SERPL-MCNC: 0.8 MG/DL (ref 0.1–1.5)
BUN SERPL-MCNC: 17 MG/DL (ref 8–22)
CALCIUM SERPL-MCNC: 9.9 MG/DL (ref 8.5–10.5)
CHLORIDE SERPL-SCNC: 108 MMOL/L (ref 96–112)
CHOLEST SERPL-MCNC: 168 MG/DL (ref 100–199)
CO2 SERPL-SCNC: 27 MMOL/L (ref 20–33)
CREAT SERPL-MCNC: 0.94 MG/DL (ref 0.5–1.4)
FASTING STATUS PATIENT QL REPORTED: NORMAL
GLOBULIN SER CALC-MCNC: 2.5 G/DL (ref 1.9–3.5)
GLUCOSE SERPL-MCNC: 84 MG/DL (ref 65–99)
HDLC SERPL-MCNC: 73 MG/DL
LDLC SERPL CALC-MCNC: 81 MG/DL
POTASSIUM SERPL-SCNC: 4 MMOL/L (ref 3.6–5.5)
PROT SERPL-MCNC: 6.9 G/DL (ref 6–8.2)
SODIUM SERPL-SCNC: 144 MMOL/L (ref 135–145)
TREADMILL STRESS: NORMAL
TRIGL SERPL-MCNC: 68 MG/DL (ref 0–149)

## 2019-07-08 PROCEDURE — 80061 LIPID PANEL: CPT

## 2019-07-08 PROCEDURE — 93015 CV STRESS TEST SUPVJ I&R: CPT | Performed by: INTERNAL MEDICINE

## 2019-07-08 PROCEDURE — 36415 COLL VENOUS BLD VENIPUNCTURE: CPT

## 2019-07-08 PROCEDURE — 80053 COMPREHEN METABOLIC PANEL: CPT

## 2019-07-09 ENCOUNTER — TELEPHONE (OUTPATIENT)
Dept: CARDIOLOGY | Facility: MEDICAL CENTER | Age: 64
End: 2019-07-09

## 2019-07-09 DIAGNOSIS — R94.39 ABNORMAL STRESS ECG WITH TREADMILL: ICD-10-CM

## 2019-07-09 DIAGNOSIS — R07.89 OTHER CHEST PAIN: ICD-10-CM

## 2019-07-09 NOTE — TELEPHONE ENCOUNTER
----- Message from Eduardo Lemus M.D. sent at 7/9/2019  7:47 AM PDT -----  Treadmill reviewed.  The test is mildly abnormal near peak exercise.  She will need further investigation.  Recommend she undergo coronary angiography.

## 2019-07-09 NOTE — TELEPHONE ENCOUNTER
Patient is scheduled on 7-12-19 for a [facility] w/poss with Dr. Shah. No meds to stop and patient to check in at 7:00 for a 9:00 procedure.H&P was done on 6-26-19 by Dr. Lemus. Pre admit to call patient.

## 2019-07-10 DIAGNOSIS — Z01.812 PRE-OPERATIVE LABORATORY EXAMINATION: ICD-10-CM

## 2019-07-10 DIAGNOSIS — Z01.810 PRE-OPERATIVE CARDIOVASCULAR EXAMINATION: ICD-10-CM

## 2019-07-10 LAB
ALBUMIN SERPL BCP-MCNC: 4.3 G/DL (ref 3.2–4.9)
ALBUMIN/GLOB SERPL: 1.7 G/DL
ALP SERPL-CCNC: 92 U/L (ref 30–99)
ALT SERPL-CCNC: 28 U/L (ref 2–50)
ANION GAP SERPL CALC-SCNC: 11 MMOL/L (ref 0–11.9)
APTT PPP: 26 SEC (ref 24.7–36)
AST SERPL-CCNC: 30 U/L (ref 12–45)
BILIRUB SERPL-MCNC: 0.6 MG/DL (ref 0.1–1.5)
BUN SERPL-MCNC: 17 MG/DL (ref 8–22)
CALCIUM SERPL-MCNC: 10 MG/DL (ref 8.5–10.5)
CHLORIDE SERPL-SCNC: 107 MMOL/L (ref 96–112)
CO2 SERPL-SCNC: 26 MMOL/L (ref 20–33)
CREAT SERPL-MCNC: 1 MG/DL (ref 0.5–1.4)
EKG IMPRESSION: NORMAL
ERYTHROCYTE [DISTWIDTH] IN BLOOD BY AUTOMATED COUNT: 42.5 FL (ref 35.9–50)
GLOBULIN SER CALC-MCNC: 2.6 G/DL (ref 1.9–3.5)
GLUCOSE SERPL-MCNC: 92 MG/DL (ref 65–99)
HCT VFR BLD AUTO: 38.7 % (ref 37–47)
HGB BLD-MCNC: 12.9 G/DL (ref 12–16)
INR PPP: 1.06 (ref 0.87–1.13)
MCH RBC QN AUTO: 31 PG (ref 27–33)
MCHC RBC AUTO-ENTMCNC: 33.3 G/DL (ref 33.6–35)
MCV RBC AUTO: 93 FL (ref 81.4–97.8)
PLATELET # BLD AUTO: 159 K/UL (ref 164–446)
PMV BLD AUTO: 9.9 FL (ref 9–12.9)
POTASSIUM SERPL-SCNC: 3.8 MMOL/L (ref 3.6–5.5)
PROT SERPL-MCNC: 6.9 G/DL (ref 6–8.2)
PROTHROMBIN TIME: 14 SEC (ref 12–14.6)
RBC # BLD AUTO: 4.16 M/UL (ref 4.2–5.4)
SODIUM SERPL-SCNC: 144 MMOL/L (ref 135–145)
WBC # BLD AUTO: 3.1 K/UL (ref 4.8–10.8)

## 2019-07-10 PROCEDURE — 85027 COMPLETE CBC AUTOMATED: CPT

## 2019-07-10 PROCEDURE — 80053 COMPREHEN METABOLIC PANEL: CPT

## 2019-07-10 PROCEDURE — 85730 THROMBOPLASTIN TIME PARTIAL: CPT

## 2019-07-10 PROCEDURE — 85610 PROTHROMBIN TIME: CPT

## 2019-07-10 PROCEDURE — 93010 ELECTROCARDIOGRAM REPORT: CPT | Performed by: INTERNAL MEDICINE

## 2019-07-10 PROCEDURE — 36415 COLL VENOUS BLD VENIPUNCTURE: CPT

## 2019-07-10 PROCEDURE — 93005 ELECTROCARDIOGRAM TRACING: CPT

## 2019-07-12 ENCOUNTER — APPOINTMENT (OUTPATIENT)
Dept: CARDIOLOGY | Facility: MEDICAL CENTER | Age: 64
End: 2019-07-12
Attending: INTERNAL MEDICINE
Payer: MEDICARE

## 2019-07-12 ENCOUNTER — HOSPITAL ENCOUNTER (OUTPATIENT)
Facility: MEDICAL CENTER | Age: 64
End: 2019-07-12
Attending: INTERNAL MEDICINE | Admitting: INTERNAL MEDICINE
Payer: MEDICARE

## 2019-07-12 VITALS
TEMPERATURE: 97.8 F | HEIGHT: 62 IN | SYSTOLIC BLOOD PRESSURE: 123 MMHG | WEIGHT: 144.18 LBS | DIASTOLIC BLOOD PRESSURE: 70 MMHG | HEART RATE: 54 BPM | BODY MASS INDEX: 26.53 KG/M2 | RESPIRATION RATE: 16 BRPM | OXYGEN SATURATION: 95 %

## 2019-07-12 DIAGNOSIS — R94.39 ABNORMAL STRESS ECG WITH TREADMILL: ICD-10-CM

## 2019-07-12 DIAGNOSIS — R07.89 OTHER CHEST PAIN: ICD-10-CM

## 2019-07-12 PROCEDURE — 700111 HCHG RX REV CODE 636 W/ 250 OVERRIDE (IP)

## 2019-07-12 PROCEDURE — 93458 L HRT ARTERY/VENTRICLE ANGIO: CPT | Mod: 26 | Performed by: INTERNAL MEDICINE

## 2019-07-12 PROCEDURE — 160002 HCHG RECOVERY MINUTES (STAT)

## 2019-07-12 PROCEDURE — 700101 HCHG RX REV CODE 250

## 2019-07-12 PROCEDURE — 99152 MOD SED SAME PHYS/QHP 5/>YRS: CPT | Performed by: INTERNAL MEDICINE

## 2019-07-12 PROCEDURE — 307093 CL-LEFT HEART CATHETERIZATION WITH POSSIBLE INTERVENTION

## 2019-07-12 PROCEDURE — 700117 HCHG RX CONTRAST REV CODE 255: Performed by: INTERNAL MEDICINE

## 2019-07-12 RX ORDER — SODIUM CHLORIDE 9 MG/ML
INJECTION, SOLUTION INTRAVENOUS CONTINUOUS
Status: DISCONTINUED | OUTPATIENT
Start: 2019-07-12 | End: 2019-07-12 | Stop reason: HOSPADM

## 2019-07-12 RX ORDER — ACETAMINOPHEN 325 MG/1
650 TABLET ORAL EVERY 6 HOURS PRN
Status: DISCONTINUED | OUTPATIENT
Start: 2019-07-12 | End: 2019-07-12 | Stop reason: HOSPADM

## 2019-07-12 RX ORDER — LIDOCAINE HYDROCHLORIDE 20 MG/ML
INJECTION, SOLUTION INFILTRATION; PERINEURAL
Status: COMPLETED
Start: 2019-07-12 | End: 2019-07-12

## 2019-07-12 RX ORDER — MIDAZOLAM HYDROCHLORIDE 1 MG/ML
INJECTION INTRAMUSCULAR; INTRAVENOUS
Status: COMPLETED
Start: 2019-07-12 | End: 2019-07-12

## 2019-07-12 RX ORDER — HEPARIN SODIUM,PORCINE 1000/ML
VIAL (ML) INJECTION
Status: COMPLETED
Start: 2019-07-12 | End: 2019-07-12

## 2019-07-12 RX ORDER — VERAPAMIL HYDROCHLORIDE 2.5 MG/ML
INJECTION, SOLUTION INTRAVENOUS
Status: COMPLETED
Start: 2019-07-12 | End: 2019-07-12

## 2019-07-12 RX ADMIN — HEPARIN SODIUM: 1000 INJECTION, SOLUTION INTRAVENOUS; SUBCUTANEOUS at 09:13

## 2019-07-12 RX ADMIN — VERAPAMIL HYDROCHLORIDE 5 MG: 2.5 INJECTION, SOLUTION INTRAVENOUS at 09:13

## 2019-07-12 RX ADMIN — LIDOCAINE HYDROCHLORIDE: 20 INJECTION, SOLUTION INFILTRATION; PERINEURAL at 09:13

## 2019-07-12 RX ADMIN — MIDAZOLAM HYDROCHLORIDE 1.5 MG: 1 INJECTION, SOLUTION INTRAMUSCULAR; INTRAVENOUS at 09:48

## 2019-07-12 RX ADMIN — FENTANYL CITRATE 100 MCG: 0.05 INJECTION, SOLUTION INTRAMUSCULAR; INTRAVENOUS at 09:44

## 2019-07-12 RX ADMIN — HEPARIN SODIUM 2000 UNITS: 200 INJECTION, SOLUTION INTRAVENOUS at 09:13

## 2019-07-12 RX ADMIN — NITROGLYCERIN 10 ML: 20 INJECTION INTRAVENOUS at 09:13

## 2019-07-12 RX ADMIN — MIDAZOLAM HYDROCHLORIDE 2 MG: 1 INJECTION, SOLUTION INTRAMUSCULAR; INTRAVENOUS at 09:37

## 2019-07-12 RX ADMIN — IOHEXOL 32 ML: 350 INJECTION, SOLUTION INTRAVENOUS at 09:50

## 2019-07-12 NOTE — DISCHARGE INSTRUCTIONS
ACTIVITY: Rest and take it easy for the first 24 hours.  A responsible adult is recommended to remain with you during that time.  It is normal to feel sleepy.  We encourage you to not do anything that requires balance, judgment or coordination.    MILD FLU-LIKE SYMPTOMS ARE NORMAL. YOU MAY EXPERIENCE GENERALIZED MUSCLE ACHES, THROAT IRRITATION, HEADACHE AND/OR SOME NAUSEA.    FOR 24 HOURS DO NOT:  Drive, operate machinery or run household appliances.  Drink beer or alcoholic beverages.   Make important decisions or sign legal documents.    SPECIAL INSTRUCTIONS:     Radial Catherization Discharge Instructions    · Do not subject hand/arm to any forceful movements for 24 hours    i.e. supporting weight when rising from the chair or bed.   · Do not drive a car for 24 hours  · You may remove the dressing tomorrow  · You may shower on the day following your procedure.  Do not take a tub bath or submerge the puncture site in water for 3 days following the procedure.  · No Lifting more than 3-5 pounds with affected wrist for 5 days  · Follow up with your cardiologist [phone] in 2-4 weeks.  · Increase fluids for 2 days post procedure.  · Continue all previous medications unless otherwise instructed.    If bleeding should occur following discharge:  · Sit down and apply firm pressure to site with your fingers for 10 minutes  · If the bleeding stops, continue to sit quietly, keeping your wrist straight for 2 hours.  Notify physician as soon as possible ([phone])  · If bleeding does not stop after 10 minutes, or if there is a large amount of bleeding or spurting, call 911 immediately.  Do not drive yourself to the hospital.    DIET: To avoid nausea, slowly advance diet as tolerated, avoiding spicy or greasy foods for the first day.  Add more substantial food to your diet according to your physician's instructions.  Babies can be fed formula or breast milk as soon as they are hungry.  INCREASE FLUIDS AND FIBER TO AVOID  CONSTIPATION.    SURGICAL DRESSING/BATHING: Keep dressing and incision dry and intact for 24 hours, may remove dressing and shower after 12 PM on 7/13, do not need to replace.  Do not submerge site in water for 7 days.    FOLLOW-UP APPOINTMENT:  A follow-up appointment should be arranged with your cardiologist [phone] and primary care doctor; call to schedule.    You should CALL YOUR PHYSICIAN if you develop:  Fever greater than 101 degrees F.  Pain not relieved by medication, or persistent nausea or vomiting.  Excessive bleeding (blood soaking through dressing) or unexpected drainage from the wound.  Extreme redness or swelling around the incision site, drainage of pus or foul smelling drainage.  Inability to urinate or empty your bladder within 8 hours.  Problems with breathing or chest pain.    You should call 911 if you develop problems with breathing or chest pain.  If you are unable to contact your doctor or surgical center, you should go to the nearest emergency room or urgent care center.  Physician's telephone #: [phone]    If any questions arise, call your doctor.  If your doctor is not available, please feel free to call the Surgical Center at [phone].  The Center is open Monday through Friday from 7AM to 7PM.  You can also call the [facility] HOTLINE open 24 hours/day, 7 days/week and speak to a nurse at [phone], or toll free at [phone].    A registered nurse may call you a few days after your surgery to see how you are doing after your procedure.    MEDICATIONS: Resume taking daily medication.  Take prescribed pain medication with food.  If no medication is prescribed, you may take non-aspirin pain medication if needed.  PAIN MEDICATION CAN BE VERY CONSTIPATING.  Take a stool softener or laxative such as senokot, pericolace, or milk of magnesia if needed.    If your physician has prescribed pain medication that includes Acetaminophen (Tylenol), do not take additional Acetaminophen  (Tylenol) while taking the prescribed medication.    Depression / Suicide Risk    As you are discharged from this [facility] facility, it is important to learn how to keep safe from harming yourself.    Recognize the warning signs:  · Abrupt changes in personality, positive or negative- including increase in energy   · Giving away possessions  · Change in eating patterns- significant weight changes-  positive or negative  · Change in sleeping patterns- unable to sleep or sleeping all the time   · Unwillingness or inability to communicate  · Depression  · Unusual sadness, discouragement and loneliness  · Talk of wanting to die  · Neglect of personal appearance   · Rebelliousness- reckless behavior  · Withdrawal from people/activities they love  · Confusion- inability to concentrate     If you or a loved one observes any of these behaviors or has concerns about self-harm, here's what you can do:  · Talk about it- your feelings and reasons for harming yourself  · Remove any means that you might use to hurt yourself (examples: pills, rope, extension cords, firearm)  · Get professional help from the community (Mental Health, Substance Abuse, psychological counseling)  · Do not be alone:Call your Safe Contact- someone whom you trust who will be there for you.  · Call your local CRISIS HOTLINE 034-0078 or 513-029-7202  · Call your local Children's Mobile Crisis Response Team [place] (882) 609-5376 or www.FashFolio  · Call the toll free National Suicide Prevention Hotlines   · National Suicide Prevention Lifeline 456-358-XWYW (3936)  · National Hope Line Network 800-SUICIDE (186-9673)

## 2019-07-12 NOTE — OP REPORT
Cardiac catheterization report    Procedure date: 7/12/2019    Referring physician: Dr. Eduardo Lemus     Pre-operative Diagnosis:  Chest discomfort with multiple risk factors and borderline positive stress test    Post-operative Diagnosis:   1.  Normal left ventricle systolic function and wall motion  2.  Normal significant coronary artery disease    Procedure:  1.  Left heart catheterization with left ventriculography  2.  Selective coronary angiography  3   Supervision of moderate conscious sedation    Complications: None    Description of Procedure:  After informed consent was obtained, the patient was brought to cardiac catheterization laboratory in fasting state.   Carl test was carried out on the right hand and was found to be negative.  Right wrist and right groin were then prepped and drapped in sterile fashion.  Versed and fentanyl were used for conscious sedation.  Lidocaine 2% was used to anesthetize the area.  A 6 Hungarian Terumo sheath was then placed in the right radial artery using Seldinger technique.  A 2.5 mg of verapamil, 100 microgram of nitroglycerine and 3000 units of heparin were administered into the radial sheath.  A 5 Hungarian TIG catheter was then advanced into the left ventricle.    Left ventricular pressure was then recorded.   Left ventriculography was performed using 16 cc of contrast injected over 2 seconds.  Left heart pullback was then performed.  Selective angiography of the left and right coronary artery was performed in multiple views using the same catheter.   The catheter was subsequently removed. Radial sheath was then removed.   Hemostasis was obtained using Terumo TR wrist band.  The patient tolerated procedure well and left cardiac catheterization laboratory in stable condition.    I supervised monitoring the patient under moderate conscious sedation beginning at 9:34 AM until the end of the case at 9:49 AM.    Findings:  There is no gradient across aortic valve.  Left  ventricular end-diastolic pressure was 21 mmHg.    Left ventriculography showed normal wall motion.  Overall LV systolic function is normal .  Ejection fraction is calculated to be 65%.    No significant coronary artery disease    This is right dominant system.    Left main is large and without flow limiting disease.  It bifurcated into left anterior descending and left circumflex artery.     Left anterior descending artery is large caliber vessel and extends to the apex.  It gives rises to one large diagonal branch in the mid segment.  There is no signficant disease in the left anterior descending artery or its major branches.  The antegrade flow is normal.    Left circumflex artery is large in caliber.   It gives rise to one large obtuse marginal branch in the mid segment and several medium sized obtuse marginal branches distally.  There is no significant disease in the LCX system.  The antegrade flow is normal.    Right coronary artery (RCA) is large caliber. It gives rise to several small acute marginal branches, posterior descending artery and posterolateral branch.  There is 30% mid right coronary stenosis but no flow-limiting disease seen in the right coronary artery or its major branches.  The antegrade flow is normal.    Plan:   Limit right wrist movement for 24 hours.  Risk factor modification        Gracy Shah M.D.

## 2019-07-12 NOTE — OR NURSING
1001 Patient arrived to unit, arouses to voice.  Right radial TR band site clean, dry and soft.  Educated on wrist precautions.  Patient denies pain at this time.  Updated on plan of care, verbalized understanding.  1005  to bedside, updated on plan of care.  1025 Access site clean, dry and soft.  Denies discomfort.  1048 1 ml air removed from TR band, no bleeding to site.  1105 2 ml air removed from TR band, no bleeding to site.  1120 3 ml air removed from TR band, no bleeding to site.  1132 Small amount of oozing noted to TR band site, 1 ml air replaced.  1212 1 ml air removed from TR band, no bleeding to site.  1222 2 ml air removed from TR band, no bleeding to site.  1240 Oozing noted at TR band site.  TR band removed and manual pressure held x 5 minutes, no bleeding noted.  1300 Access site clean, dry and soft.  All lines and monitors discontinued. Reviewed discharge paperwork with pt and . Discussed diet, activity, medications, follow up care and worsening symptoms. No questions at this time.  1320 Pt discharged home with  via wheelchair by RN.

## 2019-07-15 ENCOUNTER — HOSPITAL ENCOUNTER (OUTPATIENT)
Dept: CARDIOLOGY | Facility: MEDICAL CENTER | Age: 64
End: 2019-07-15
Attending: INTERNAL MEDICINE
Payer: MEDICARE

## 2019-07-15 DIAGNOSIS — R00.2 PALPITATIONS: ICD-10-CM

## 2019-07-15 DIAGNOSIS — I10 ESSENTIAL HYPERTENSION, BENIGN: ICD-10-CM

## 2019-07-15 PROCEDURE — 93306 TTE W/DOPPLER COMPLETE: CPT

## 2019-07-16 LAB
LV EJECT FRACT  99904: 65
LV EJECT FRACT MOD 2C 99903: 56.06
LV EJECT FRACT MOD 4C 99902: 65.7
LV EJECT FRACT MOD BP 99901: 64.53

## 2019-07-16 PROCEDURE — 93306 TTE W/DOPPLER COMPLETE: CPT | Mod: 26 | Performed by: INTERNAL MEDICINE

## 2019-07-18 ENCOUNTER — TELEPHONE (OUTPATIENT)
Dept: CARDIOLOGY | Facility: MEDICAL CENTER | Age: 64
End: 2019-07-18

## 2019-07-18 NOTE — TELEPHONE ENCOUNTER
----- Message from Mili Banks L.P.N. sent at 7/12/2019  9:13 AM PDT -----  Await echo results.  ----- Message -----  From: Eduardo Lemus M.D.  Sent: 7/12/2019   7:44 AM  To: Mili Banks L.P.N.    LDL has dropped from 192 all the way to 81.  Amazing.  Keep up the good work.  Same meds.

## 2019-07-26 ENCOUNTER — TELEPHONE (OUTPATIENT)
Dept: CARDIOLOGY | Facility: MEDICAL CENTER | Age: 64
End: 2019-07-26

## 2019-07-26 NOTE — TELEPHONE ENCOUNTER
JAH/Mili    Pt states she spoke to [facility] and they told she needs CPAP machine ordered per OPO results. Please call pt to advise at [phone].

## 2019-08-08 ENCOUNTER — ANESTHESIA EVENT (OUTPATIENT)
Dept: SURGERY | Facility: MEDICAL CENTER | Age: 64
End: 2019-08-08
Payer: MEDICARE

## 2019-08-08 ENCOUNTER — ANESTHESIA (OUTPATIENT)
Dept: SURGERY | Facility: MEDICAL CENTER | Age: 64
End: 2019-08-08
Payer: MEDICARE

## 2019-08-08 ENCOUNTER — APPOINTMENT (OUTPATIENT)
Dept: RADIOLOGY | Facility: MEDICAL CENTER | Age: 64
End: 2019-08-08
Attending: EMERGENCY MEDICINE
Payer: MEDICARE

## 2019-08-08 ENCOUNTER — HOSPITAL ENCOUNTER (EMERGENCY)
Facility: MEDICAL CENTER | Age: 64
End: 2019-08-08
Attending: EMERGENCY MEDICINE | Admitting: SURGERY
Payer: MEDICARE

## 2019-08-08 VITALS
HEIGHT: 62 IN | BODY MASS INDEX: 27.18 KG/M2 | DIASTOLIC BLOOD PRESSURE: 50 MMHG | WEIGHT: 147.71 LBS | OXYGEN SATURATION: 98 % | HEART RATE: 77 BPM | TEMPERATURE: 97.4 F | RESPIRATION RATE: 16 BRPM | SYSTOLIC BLOOD PRESSURE: 110 MMHG

## 2019-08-08 DIAGNOSIS — R10.9 ACUTE POSTOPERATIVE ABDOMINAL PAIN: Primary | ICD-10-CM

## 2019-08-08 DIAGNOSIS — G89.18 ACUTE POSTOPERATIVE ABDOMINAL PAIN: Primary | ICD-10-CM

## 2019-08-08 DIAGNOSIS — K35.30 ACUTE APPENDICITIS WITH LOCALIZED PERITONITIS, WITHOUT PERFORATION, ABSCESS, OR GANGRENE: ICD-10-CM

## 2019-08-08 PROBLEM — K35.80 APPENDICITIS, ACUTE: Status: ACTIVE | Noted: 2019-08-08

## 2019-08-08 PROBLEM — G47.33 OBSTRUCTIVE SLEEP APNEA SYNDROME: Status: ACTIVE | Noted: 2019-08-08

## 2019-08-08 PROBLEM — K21.9 GASTROESOPHAGEAL REFLUX DISEASE: Status: ACTIVE | Noted: 2019-08-08

## 2019-08-08 PROBLEM — I25.10 CAD (CORONARY ARTERY DISEASE): Status: ACTIVE | Noted: 2019-08-08

## 2019-08-08 LAB
ALBUMIN SERPL BCP-MCNC: 4.4 G/DL (ref 3.2–4.9)
ALBUMIN/GLOB SERPL: 1.8 G/DL
ALP SERPL-CCNC: 87 U/L (ref 30–99)
ALT SERPL-CCNC: 28 U/L (ref 2–50)
ANION GAP SERPL CALC-SCNC: 12 MMOL/L (ref 0–11.9)
APPEARANCE UR: CLEAR
AST SERPL-CCNC: 29 U/L (ref 12–45)
BACTERIA #/AREA URNS HPF: NEGATIVE /HPF
BASOPHILS # BLD AUTO: 0.3 % (ref 0–1.8)
BASOPHILS # BLD: 0.02 K/UL (ref 0–0.12)
BILIRUB SERPL-MCNC: 0.9 MG/DL (ref 0.1–1.5)
BILIRUB UR QL STRIP.AUTO: NEGATIVE
BUN SERPL-MCNC: 14 MG/DL (ref 8–22)
CALCIUM SERPL-MCNC: 9.5 MG/DL (ref 8.5–10.5)
CHLORIDE SERPL-SCNC: 104 MMOL/L (ref 96–112)
CO2 SERPL-SCNC: 25 MMOL/L (ref 20–33)
COLOR UR: YELLOW
CREAT SERPL-MCNC: 0.8 MG/DL (ref 0.5–1.4)
EOSINOPHIL # BLD AUTO: 0.01 K/UL (ref 0–0.51)
EOSINOPHIL NFR BLD: 0.1 % (ref 0–6.9)
EPI CELLS #/AREA URNS HPF: NEGATIVE /HPF
ERYTHROCYTE [DISTWIDTH] IN BLOOD BY AUTOMATED COUNT: 44.9 FL (ref 35.9–50)
GLOBULIN SER CALC-MCNC: 2.4 G/DL (ref 1.9–3.5)
GLUCOSE SERPL-MCNC: 92 MG/DL (ref 65–99)
GLUCOSE UR STRIP.AUTO-MCNC: NEGATIVE MG/DL
HCT VFR BLD AUTO: 43.5 % (ref 37–47)
HGB BLD-MCNC: 14.3 G/DL (ref 12–16)
HYALINE CASTS #/AREA URNS LPF: NORMAL /LPF
IMM GRANULOCYTES # BLD AUTO: 0.02 K/UL (ref 0–0.11)
IMM GRANULOCYTES NFR BLD AUTO: 0.3 % (ref 0–0.9)
KETONES UR STRIP.AUTO-MCNC: 40 MG/DL
LEUKOCYTE ESTERASE UR QL STRIP.AUTO: ABNORMAL
LIPASE SERPL-CCNC: 14 U/L (ref 11–82)
LYMPHOCYTES # BLD AUTO: 0.44 K/UL (ref 1–4.8)
LYMPHOCYTES NFR BLD: 5.9 % (ref 22–41)
MCH RBC QN AUTO: 31 PG (ref 27–33)
MCHC RBC AUTO-ENTMCNC: 32.9 G/DL (ref 33.6–35)
MCV RBC AUTO: 94.2 FL (ref 81.4–97.8)
MICRO URNS: ABNORMAL
MONOCYTES # BLD AUTO: 0.51 K/UL (ref 0–0.85)
MONOCYTES NFR BLD AUTO: 6.9 % (ref 0–13.4)
NEUTROPHILS # BLD AUTO: 6.44 K/UL (ref 2–7.15)
NEUTROPHILS NFR BLD: 86.5 % (ref 44–72)
NITRITE UR QL STRIP.AUTO: NEGATIVE
NRBC # BLD AUTO: 0 K/UL
NRBC BLD-RTO: 0 /100 WBC
PATHOLOGY CONSULT NOTE: NORMAL
PH UR STRIP.AUTO: 7.5 [PH] (ref 5–8)
PLATELET # BLD AUTO: 151 K/UL (ref 164–446)
PMV BLD AUTO: 9.8 FL (ref 9–12.9)
POTASSIUM SERPL-SCNC: 3.7 MMOL/L (ref 3.6–5.5)
PROT SERPL-MCNC: 6.8 G/DL (ref 6–8.2)
PROT UR QL STRIP: NEGATIVE MG/DL
RBC # BLD AUTO: 4.62 M/UL (ref 4.2–5.4)
RBC # URNS HPF: NORMAL /HPF
RBC UR QL AUTO: NEGATIVE
SODIUM SERPL-SCNC: 141 MMOL/L (ref 135–145)
SP GR UR STRIP.AUTO: 1.02
UROBILINOGEN UR STRIP.AUTO-MCNC: 1 MG/DL
WBC # BLD AUTO: 7.4 K/UL (ref 4.8–10.8)
WBC #/AREA URNS HPF: NORMAL /HPF

## 2019-08-08 PROCEDURE — 501583 HCHG TROCAR, THRD CAN&SEAL 5X100: Performed by: SURGERY

## 2019-08-08 PROCEDURE — 83690 ASSAY OF LIPASE: CPT

## 2019-08-08 PROCEDURE — 501399 HCHG SPECIMAN BAG, ENDO CATC: Performed by: SURGERY

## 2019-08-08 PROCEDURE — 700111 HCHG RX REV CODE 636 W/ 250 OVERRIDE (IP): Performed by: ANESTHESIOLOGY

## 2019-08-08 PROCEDURE — 160009 HCHG ANES TIME/MIN: Performed by: SURGERY

## 2019-08-08 PROCEDURE — 88304 TISSUE EXAM BY PATHOLOGIST: CPT

## 2019-08-08 PROCEDURE — 700102 HCHG RX REV CODE 250 W/ 637 OVERRIDE(OP): Performed by: ANESTHESIOLOGY

## 2019-08-08 PROCEDURE — 700101 HCHG RX REV CODE 250: Performed by: ANESTHESIOLOGY

## 2019-08-08 PROCEDURE — A6402 STERILE GAUZE <= 16 SQ IN: HCPCS | Performed by: SURGERY

## 2019-08-08 PROCEDURE — 160048 HCHG OR STATISTICAL LEVEL 1-5: Performed by: SURGERY

## 2019-08-08 PROCEDURE — 501572 HCHG TROCAR, SHIELD OBTU 5X100: Performed by: SURGERY

## 2019-08-08 PROCEDURE — 502571 HCHG PACK, LAP CHOLE: Performed by: SURGERY

## 2019-08-08 PROCEDURE — 501450 HCHG STAPLES, ENDO MULTIFIRE: Performed by: SURGERY

## 2019-08-08 PROCEDURE — 160046 HCHG PACU - 1ST 60 MINS PHASE II: Performed by: SURGERY

## 2019-08-08 PROCEDURE — 700101 HCHG RX REV CODE 250: Performed by: EMERGENCY MEDICINE

## 2019-08-08 PROCEDURE — 96367 TX/PROPH/DG ADDL SEQ IV INF: CPT | Mod: XU

## 2019-08-08 PROCEDURE — 80053 COMPREHEN METABOLIC PANEL: CPT

## 2019-08-08 PROCEDURE — 99291 CRITICAL CARE FIRST HOUR: CPT

## 2019-08-08 PROCEDURE — 700105 HCHG RX REV CODE 258: Performed by: ANESTHESIOLOGY

## 2019-08-08 PROCEDURE — 700105 HCHG RX REV CODE 258: Performed by: EMERGENCY MEDICINE

## 2019-08-08 PROCEDURE — A9270 NON-COVERED ITEM OR SERVICE: HCPCS | Performed by: ANESTHESIOLOGY

## 2019-08-08 PROCEDURE — 700101 HCHG RX REV CODE 250: Performed by: SURGERY

## 2019-08-08 PROCEDURE — 501582 HCHG TROCAR, THRD BLADED: Performed by: SURGERY

## 2019-08-08 PROCEDURE — 160028 HCHG SURGERY MINUTES - 1ST 30 MINS LEVEL 3: Performed by: SURGERY

## 2019-08-08 PROCEDURE — 96375 TX/PRO/DX INJ NEW DRUG ADDON: CPT | Mod: XU

## 2019-08-08 PROCEDURE — 160035 HCHG PACU - 1ST 60 MINS PHASE I: Performed by: SURGERY

## 2019-08-08 PROCEDURE — 160025 RECOVERY II MINUTES (STATS): Performed by: SURGERY

## 2019-08-08 PROCEDURE — 74177 CT ABD & PELVIS W/CONTRAST: CPT

## 2019-08-08 PROCEDURE — 160002 HCHG RECOVERY MINUTES (STAT): Performed by: SURGERY

## 2019-08-08 PROCEDURE — 81001 URINALYSIS AUTO W/SCOPE: CPT

## 2019-08-08 PROCEDURE — 700111 HCHG RX REV CODE 636 W/ 250 OVERRIDE (IP): Performed by: EMERGENCY MEDICINE

## 2019-08-08 PROCEDURE — 85025 COMPLETE CBC W/AUTO DIFF WBC: CPT

## 2019-08-08 PROCEDURE — 700117 HCHG RX CONTRAST REV CODE 255: Performed by: EMERGENCY MEDICINE

## 2019-08-08 PROCEDURE — 96365 THER/PROPH/DIAG IV INF INIT: CPT | Mod: XU

## 2019-08-08 PROCEDURE — 160039 HCHG SURGERY MINUTES - EA ADDL 1 MIN LEVEL 3: Performed by: SURGERY

## 2019-08-08 PROCEDURE — 160047 HCHG PACU  - EA ADDL 30 MINS PHASE II: Performed by: SURGERY

## 2019-08-08 RX ORDER — BUPIVACAINE HYDROCHLORIDE AND EPINEPHRINE 5; 5 MG/ML; UG/ML
INJECTION, SOLUTION EPIDURAL; INTRACAUDAL; PERINEURAL
Status: DISCONTINUED | OUTPATIENT
Start: 2019-08-08 | End: 2019-08-08 | Stop reason: HOSPADM

## 2019-08-08 RX ORDER — ONDANSETRON 2 MG/ML
4 INJECTION INTRAMUSCULAR; INTRAVENOUS ONCE
Status: COMPLETED | OUTPATIENT
Start: 2019-08-08 | End: 2019-08-08

## 2019-08-08 RX ORDER — ROCURONIUM BROMIDE 10 MG/ML
INJECTION, SOLUTION INTRAVENOUS PRN
Status: DISCONTINUED | OUTPATIENT
Start: 2019-08-08 | End: 2019-08-08 | Stop reason: SURG

## 2019-08-08 RX ORDER — HYDROMORPHONE HYDROCHLORIDE 1 MG/ML
0.1 INJECTION, SOLUTION INTRAMUSCULAR; INTRAVENOUS; SUBCUTANEOUS
Status: DISCONTINUED | OUTPATIENT
Start: 2019-08-08 | End: 2019-08-08 | Stop reason: HOSPADM

## 2019-08-08 RX ORDER — GABAPENTIN 300 MG/1
300 CAPSULE ORAL ONCE
Status: DISCONTINUED | OUTPATIENT
Start: 2019-08-08 | End: 2019-08-08 | Stop reason: HOSPADM

## 2019-08-08 RX ORDER — OXYCODONE HYDROCHLORIDE 5 MG/1
5 TABLET ORAL EVERY 4 HOURS PRN
Qty: 15 TAB | Refills: 0 | Status: SHIPPED | OUTPATIENT
Start: 2019-08-08 | End: 2019-08-11

## 2019-08-08 RX ORDER — PHENYLEPHRINE HCL IN 0.9% NACL 0.5 MG/5ML
SYRINGE (ML) INTRAVENOUS PRN
Status: DISCONTINUED | OUTPATIENT
Start: 2019-08-08 | End: 2019-08-08 | Stop reason: SURG

## 2019-08-08 RX ORDER — SODIUM CHLORIDE, SODIUM LACTATE, POTASSIUM CHLORIDE, CALCIUM CHLORIDE 600; 310; 30; 20 MG/100ML; MG/100ML; MG/100ML; MG/100ML
INJECTION, SOLUTION INTRAVENOUS
Status: DISCONTINUED | OUTPATIENT
Start: 2019-08-08 | End: 2019-08-08 | Stop reason: SURG

## 2019-08-08 RX ORDER — HYDROMORPHONE HYDROCHLORIDE 1 MG/ML
0.2 INJECTION, SOLUTION INTRAMUSCULAR; INTRAVENOUS; SUBCUTANEOUS
Status: DISCONTINUED | OUTPATIENT
Start: 2019-08-08 | End: 2019-08-08 | Stop reason: HOSPADM

## 2019-08-08 RX ORDER — LIDOCAINE HYDROCHLORIDE 20 MG/ML
INJECTION, SOLUTION EPIDURAL; INFILTRATION; INTRACAUDAL; PERINEURAL PRN
Status: DISCONTINUED | OUTPATIENT
Start: 2019-08-08 | End: 2019-08-08 | Stop reason: SURG

## 2019-08-08 RX ORDER — ACETAMINOPHEN 500 MG
1000 TABLET ORAL ONCE
Status: DISCONTINUED | OUTPATIENT
Start: 2019-08-08 | End: 2019-08-08 | Stop reason: HOSPADM

## 2019-08-08 RX ORDER — OXYCODONE HYDROCHLORIDE 5 MG/1
5 TABLET ORAL EVERY 4 HOURS PRN
Status: DISCONTINUED | OUTPATIENT
Start: 2019-08-08 | End: 2019-08-08 | Stop reason: HOSPADM

## 2019-08-08 RX ORDER — KETOROLAC TROMETHAMINE 30 MG/ML
INJECTION, SOLUTION INTRAMUSCULAR; INTRAVENOUS PRN
Status: DISCONTINUED | OUTPATIENT
Start: 2019-08-08 | End: 2019-08-09 | Stop reason: HOSPADM

## 2019-08-08 RX ORDER — CEFOTETAN DISODIUM 2 G/20ML
INJECTION, POWDER, FOR SOLUTION INTRAMUSCULAR; INTRAVENOUS PRN
Status: DISCONTINUED | OUTPATIENT
Start: 2019-08-08 | End: 2019-08-08 | Stop reason: SURG

## 2019-08-08 RX ORDER — SODIUM CHLORIDE, SODIUM LACTATE, POTASSIUM CHLORIDE, CALCIUM CHLORIDE 600; 310; 30; 20 MG/100ML; MG/100ML; MG/100ML; MG/100ML
INJECTION, SOLUTION INTRAVENOUS CONTINUOUS
Status: DISCONTINUED | OUTPATIENT
Start: 2019-08-08 | End: 2019-08-08 | Stop reason: HOSPADM

## 2019-08-08 RX ORDER — HALOPERIDOL 5 MG/ML
1 INJECTION INTRAMUSCULAR
Status: DISCONTINUED | OUTPATIENT
Start: 2019-08-08 | End: 2019-08-08 | Stop reason: HOSPADM

## 2019-08-08 RX ORDER — SODIUM CHLORIDE 9 MG/ML
INJECTION, SOLUTION INTRAVENOUS CONTINUOUS
Status: DISCONTINUED | OUTPATIENT
Start: 2019-08-08 | End: 2019-08-08 | Stop reason: HOSPADM

## 2019-08-08 RX ORDER — SODIUM CHLORIDE 9 MG/ML
1000 INJECTION, SOLUTION INTRAVENOUS ONCE
Status: COMPLETED | OUTPATIENT
Start: 2019-08-08 | End: 2019-08-08

## 2019-08-08 RX ORDER — DEXAMETHASONE SODIUM PHOSPHATE 4 MG/ML
INJECTION, SOLUTION INTRA-ARTICULAR; INTRALESIONAL; INTRAMUSCULAR; INTRAVENOUS; SOFT TISSUE PRN
Status: DISCONTINUED | OUTPATIENT
Start: 2019-08-08 | End: 2019-08-08 | Stop reason: SURG

## 2019-08-08 RX ORDER — SCOLOPAMINE TRANSDERMAL SYSTEM 1 MG/1
1 PATCH, EXTENDED RELEASE TRANSDERMAL ONCE
Status: DISCONTINUED | OUTPATIENT
Start: 2019-08-08 | End: 2019-08-08 | Stop reason: HOSPADM

## 2019-08-08 RX ORDER — OXYCODONE HCL 5 MG/5 ML
10 SOLUTION, ORAL ORAL
Status: COMPLETED | OUTPATIENT
Start: 2019-08-08 | End: 2019-08-08

## 2019-08-08 RX ORDER — SCOLOPAMINE TRANSDERMAL SYSTEM 1 MG/1
PATCH, EXTENDED RELEASE TRANSDERMAL
Status: COMPLETED
Start: 2019-08-08 | End: 2019-08-08

## 2019-08-08 RX ORDER — ONDANSETRON 2 MG/ML
INJECTION INTRAMUSCULAR; INTRAVENOUS PRN
Status: DISCONTINUED | OUTPATIENT
Start: 2019-08-08 | End: 2019-08-08 | Stop reason: SURG

## 2019-08-08 RX ORDER — OXYCODONE HCL 5 MG/5 ML
5 SOLUTION, ORAL ORAL
Status: COMPLETED | OUTPATIENT
Start: 2019-08-08 | End: 2019-08-08

## 2019-08-08 RX ORDER — HYDROMORPHONE HYDROCHLORIDE 1 MG/ML
0.4 INJECTION, SOLUTION INTRAMUSCULAR; INTRAVENOUS; SUBCUTANEOUS
Status: DISCONTINUED | OUTPATIENT
Start: 2019-08-08 | End: 2019-08-08 | Stop reason: HOSPADM

## 2019-08-08 RX ORDER — MORPHINE SULFATE 4 MG/ML
4 INJECTION, SOLUTION INTRAMUSCULAR; INTRAVENOUS ONCE
Status: COMPLETED | OUTPATIENT
Start: 2019-08-08 | End: 2019-08-08

## 2019-08-08 RX ORDER — ONDANSETRON 2 MG/ML
4 INJECTION INTRAMUSCULAR; INTRAVENOUS
Status: DISCONTINUED | OUTPATIENT
Start: 2019-08-08 | End: 2019-08-08 | Stop reason: HOSPADM

## 2019-08-08 RX ORDER — HYDROMORPHONE HYDROCHLORIDE 1 MG/ML
1 INJECTION, SOLUTION INTRAMUSCULAR; INTRAVENOUS; SUBCUTANEOUS ONCE
Status: COMPLETED | OUTPATIENT
Start: 2019-08-08 | End: 2019-08-08

## 2019-08-08 RX ADMIN — SUGAMMADEX 200 MG: 100 INJECTION, SOLUTION INTRAVENOUS at 12:36

## 2019-08-08 RX ADMIN — CEFTRIAXONE SODIUM 1 G: 1 INJECTION, POWDER, FOR SOLUTION INTRAMUSCULAR; INTRAVENOUS at 08:20

## 2019-08-08 RX ADMIN — Medication 100 MCG: at 12:07

## 2019-08-08 RX ADMIN — HYDROMORPHONE HYDROCHLORIDE 1 MG: 1 INJECTION, SOLUTION INTRAMUSCULAR; INTRAVENOUS; SUBCUTANEOUS at 07:13

## 2019-08-08 RX ADMIN — Medication 100 MCG: at 12:36

## 2019-08-08 RX ADMIN — ONDANSETRON 4 MG: 2 INJECTION INTRAMUSCULAR; INTRAVENOUS at 06:37

## 2019-08-08 RX ADMIN — Medication 100 MCG: at 12:01

## 2019-08-08 RX ADMIN — DEXAMETHASONE SODIUM PHOSPHATE 4 MG: 4 INJECTION, SOLUTION INTRA-ARTICULAR; INTRALESIONAL; INTRAMUSCULAR; INTRAVENOUS; SOFT TISSUE at 11:55

## 2019-08-08 RX ADMIN — SODIUM CHLORIDE: 9 INJECTION, SOLUTION INTRAVENOUS at 09:56

## 2019-08-08 RX ADMIN — Medication 100 MCG: at 12:11

## 2019-08-08 RX ADMIN — CEFOTETAN DISODIUM 2 G: 2 INJECTION, POWDER, FOR SOLUTION INTRAMUSCULAR; INTRAVENOUS at 11:58

## 2019-08-08 RX ADMIN — SCOPOLAMINE 1 PATCH: 1 PATCH, EXTENDED RELEASE TRANSDERMAL at 11:30

## 2019-08-08 RX ADMIN — FENTANYL CITRATE 50 MCG: 50 INJECTION, SOLUTION INTRAMUSCULAR; INTRAVENOUS at 11:50

## 2019-08-08 RX ADMIN — IOHEXOL 100 ML: 350 INJECTION, SOLUTION INTRAVENOUS at 07:36

## 2019-08-08 RX ADMIN — FENTANYL CITRATE 50 MCG: 50 INJECTION, SOLUTION INTRAMUSCULAR; INTRAVENOUS at 12:32

## 2019-08-08 RX ADMIN — PROPOFOL 250 MG: 10 INJECTION, EMULSION INTRAVENOUS at 11:54

## 2019-08-08 RX ADMIN — SODIUM CHLORIDE, POTASSIUM CHLORIDE, SODIUM LACTATE AND CALCIUM CHLORIDE: 600; 310; 30; 20 INJECTION, SOLUTION INTRAVENOUS at 11:45

## 2019-08-08 RX ADMIN — SODIUM CHLORIDE 1000 ML: 9 INJECTION, SOLUTION INTRAVENOUS at 06:42

## 2019-08-08 RX ADMIN — METRONIDAZOLE 500 MG: 500 INJECTION, SOLUTION INTRAVENOUS at 08:46

## 2019-08-08 RX ADMIN — Medication 200 MCG: at 11:54

## 2019-08-08 RX ADMIN — OXYCODONE HYDROCHLORIDE 10 MG: 5 SOLUTION ORAL at 13:20

## 2019-08-08 RX ADMIN — MORPHINE SULFATE 4 MG: 4 INJECTION INTRAVENOUS at 06:37

## 2019-08-08 RX ADMIN — ROCURONIUM BROMIDE 50 MG: 10 INJECTION, SOLUTION INTRAVENOUS at 11:54

## 2019-08-08 RX ADMIN — LIDOCAINE HYDROCHLORIDE 3 ML: 20 INJECTION, SOLUTION EPIDURAL; INFILTRATION; INTRACAUDAL at 12:42

## 2019-08-08 RX ADMIN — KETOROLAC TROMETHAMINE 30 MG: 30 INJECTION, SOLUTION INTRAMUSCULAR at 12:35

## 2019-08-08 RX ADMIN — SODIUM CHLORIDE, POTASSIUM CHLORIDE, SODIUM LACTATE AND CALCIUM CHLORIDE: 600; 310; 30; 20 INJECTION, SOLUTION INTRAVENOUS at 12:35

## 2019-08-08 RX ADMIN — ONDANSETRON 4 MG: 2 INJECTION INTRAMUSCULAR; INTRAVENOUS at 12:36

## 2019-08-08 NOTE — ED PROVIDER NOTES
"ED Provider Note    CHIEF COMPLAINT  Chief Complaint   Patient presents with   • Abdominal Pain     Started at 0100. Reports \"searing pain\" across epigastric area. Reports that abdomen is swelling.       HPI  Haleykalin Hawthorne is a 64 y.o. female who presents to the emergency department through triage with her significant other complaining of abdominal pain.  Patient had sudden onset mid and upper abdominal pain around 1 AM.  Nausea, dry heaving and then one episode of vomiting after drinking water prior to arrival.  Pain now more so in the lower abdomen, although persistent, sharp.  No back pain.  No chest pain, shortness of breath or syncope.  Tactile fever.  No dysuria, hematuria, frequency or urgency.  No history for similar symptoms.  No sick contacts.    REVIEW OF SYSTEMS  See HPI for further details. All other systems are negative.     PAST MEDICAL HISTORY   has a past medical history of Arrhythmia, Arthritis, Breast cancer (HCC), Cancer (HCC) (), GERD (gastroesophageal reflux disease), Heart burn, High cholesterol, Hypertension, Indigestion, Pain (2018), and Pneumonia ().    SOCIAL HISTORY  Social History     Tobacco Use   • Smoking status: Former Smoker     Packs/day: 1.00     Years: 10.00     Pack years: 10.00     Types: Cigarettes     Last attempt to quit: 1979     Years since quittin.6   • Smokeless tobacco: Never Used   Substance and Sexual Activity   • Alcohol use: Yes     Comment: 3 per week   • Drug use: No   • Sexual activity: Not on file       SURGICAL HISTORY   has a past surgical history that includes breast biopsy (unknown); hysterectomy laparoscopy (); tonsillectomy; partial mastectomy (2015); lumpectomy (2015); ganglion excision (Left, 2018); carpal tunnel release (); finger arthroplasty (Right, 2018); tendon transfer (2018); finger arthroplasty (Left, 2018); and flexor tendon repair (Left, 2018).    CURRENT " "MEDICATIONS  Home Medications    **Home medications have not yet been reviewed for this encounter**         ALLERGIES  Allergies   Allergen Reactions   • Sulfa Drugs Hives       PHYSICAL EXAM  VITAL SIGNS: /69   Pulse 72   Temp 36.2 °C (97.2 °F) (Temporal)   Resp 18   Ht 1.575 m (5' 2\")   Wt 67 kg (147 lb 11.3 oz)   SpO2 94%   BMI 27.02 kg/m²   Pulse ox interpretation: I interpret this pulse ox as normal.  Constitutional: Alert in no apparent distress.  HENT: Normocephalic, atraumatic. Bilateral external ears normal, Nose normal. Moist mucous membranes.    Eyes: Pupils are equal and reactive, Conjunctiva normal.   Neck: Normal range of motion, Supple  Lymphatic: No lymphadenopathy noted.   Cardiovascular: Regular rate and rhythm, no murmurs. Distal pulses intact.   Thorax & Lungs: Normal breath sounds.  No wheezing/rales/ronchi. No increased work of breathing  Abdomen: Soft, non-distended.  Tender to palpation suprapubic and right lower quadrant, mild guarding, no peritonitis.  No palpable pulsatile mass.  Skin: Warm, Dry, No erythema, No rash.   Musculoskeletal: Good range of motion in all major joints.  Neurologic: Alert and oriented x4.  Moves 4 extremity spontaneously.  Psychiatric: Affect normal, Judgment normal, Mood normal.       DIAGNOSTIC STUDIES / PROCEDURES  LABS  Results for orders placed or performed during the hospital encounter of 08/08/19   CBC WITH DIFFERENTIAL   Result Value Ref Range    WBC 7.4 4.8 - 10.8 K/uL    RBC 4.62 4.20 - 5.40 M/uL    Hemoglobin 14.3 12.0 - 16.0 g/dL    Hematocrit 43.5 37.0 - 47.0 %    MCV 94.2 81.4 - 97.8 fL    MCH 31.0 27.0 - 33.0 pg    MCHC 32.9 (L) 33.6 - 35.0 g/dL    RDW 44.9 35.9 - 50.0 fL    Platelet Count 151 (L) 164 - 446 K/uL    MPV 9.8 9.0 - 12.9 fL    Neutrophils-Polys 86.50 (H) 44.00 - 72.00 %    Lymphocytes 5.90 (L) 22.00 - 41.00 %    Monocytes 6.90 0.00 - 13.40 %    Eosinophils 0.10 0.00 - 6.90 %    Basophils 0.30 0.00 - 1.80 %    Immature " Granulocytes 0.30 0.00 - 0.90 %    Nucleated RBC 0.00 /100 WBC    Neutrophils (Absolute) 6.44 2.00 - 7.15 K/uL    Lymphs (Absolute) 0.44 (L) 1.00 - 4.80 K/uL    Monos (Absolute) 0.51 0.00 - 0.85 K/uL    Eos (Absolute) 0.01 0.00 - 0.51 K/uL    Baso (Absolute) 0.02 0.00 - 0.12 K/uL    Immature Granulocytes (abs) 0.02 0.00 - 0.11 K/uL    NRBC (Absolute) 0.00 K/uL   COMP METABOLIC PANEL   Result Value Ref Range    Sodium 141 135 - 145 mmol/L    Potassium 3.7 3.6 - 5.5 mmol/L    Chloride 104 96 - 112 mmol/L    Co2 25 20 - 33 mmol/L    Anion Gap 12.0 (H) 0.0 - 11.9    Glucose 92 65 - 99 mg/dL    Bun 14 8 - 22 mg/dL    Creatinine 0.80 0.50 - 1.40 mg/dL    Calcium 9.5 8.5 - 10.5 mg/dL    AST(SGOT) 29 12 - 45 U/L    ALT(SGPT) 28 2 - 50 U/L    Alkaline Phosphatase 87 30 - 99 U/L    Total Bilirubin 0.9 0.1 - 1.5 mg/dL    Albumin 4.4 3.2 - 4.9 g/dL    Total Protein 6.8 6.0 - 8.2 g/dL    Globulin 2.4 1.9 - 3.5 g/dL    A-G Ratio 1.8 g/dL   LIPASE   Result Value Ref Range    Lipase 14 11 - 82 U/L   URINALYSIS,CULTURE IF INDICATED   Result Value Ref Range    Color Yellow     Character Clear     Specific Gravity 1.020 <1.035    Ph 7.5 5.0 - 8.0    Glucose Negative Negative mg/dL    Ketones 40 (A) Negative mg/dL    Protein Negative Negative mg/dL    Bilirubin Negative Negative    Urobilinogen, Urine 1.0 Negative    Nitrite Negative Negative    Leukocyte Esterase Moderate (A) Negative    Occult Blood Negative Negative    Micro Urine Req Microscopic    URINE MICROSCOPIC (W/UA)   Result Value Ref Range    WBC 2-5 /hpf    RBC 0-2 /hpf    Bacteria Negative None /hpf    Epithelial Cells Negative /hpf    Hyaline Cast 0-2 /lpf   ESTIMATED GFR   Result Value Ref Range    GFR If African American >60 >60 mL/min/1.73 m 2    GFR If Non African American >60 >60 mL/min/1.73 m 2       RADIOLOGY  CT-ABDOMEN-PELVIS WITH   Final Result         1.  Findings suspicious for acute appendicitis without evidence of perforation or intra-abdominal abscess.    2.  Small hiatal hernia.   3.  Indeterminate 13 mm right adrenal nodule. See details above.                   COURSE & MEDICAL DECISION MAKING  Nursing notes and vital signs were reviewed. (See chart for details)  The patients records were reviewed, history was obtained from the patient;     Patient reevaluated 20 minutes after morphine, pain from 10 out of 10 to 6 or 7 out of 10.  Add Dilaudid for persistent discomfort.  Abdominal exam unchanged.  Labs are unrevealing, no leukocytosis or lecture light derangement.  Sterile pyuria although patient denies urinary symptoms and has persistent concerning right lower quadrant abdominal pain.  Awaiting CT.    Patient resting more comfortably after Dilaudid.  On her way to CT.    8:01 AM CT as above.  Acute appendicitis.  Rocephin and Flagyl have been ordered.  Maintenance fluid infusing.  Hemodynamically stable.  Pain controlled after the Dilaudid.  Surgery paged.  Patient is aware the findings and agreeable to the disposition and plan.    7402 - Dr. Cunningham is aware the patient agreeable to consultation.      FINAL IMPRESSION  (K35.30) Acute appendicitis with localized peritonitis, without perforation, abscess, or gangrene      Electronically signed by: Yael Hernandez, 8/8/2019 6:35 AM      This dictation was created using voice recognition software. The accuracy of the dictation is limited to the abilities of the software. I expect there may be some errors of grammar and possibly content. The nursing notes were reviewed and certain aspects of this information were incorporated into this note.

## 2019-08-08 NOTE — ASSESSMENT & PLAN NOTE
Periumbilical pain moves to RLQ  CT Findings suspicious for acute appendicitis without evidence of perforation or intra-abdominal abscess.

## 2019-08-08 NOTE — ED NOTES
Med Rec completed per patient and home pharmacy ([facility])   Allergies reviewed  No ORAL antibiotics in last 14 days

## 2019-08-08 NOTE — OR SURGEON
Immediate Post OP Note    PreOp Diagnosis: Abdominal pain / possible appendicictis    PostOp Diagnosis: Acute appendicitis    Procedure(s):  APPENDECTOMY, LAPAROSCOPIC - Wound Class: Clean Contaminated    Surgeon(s):  John Cunningham M.D.    Anesthesiologist/Type of Anesthesia: GETA / Local 30 cc 0.5% maracaine with epi  Anesthesiologist: Olimpia Gonzalez M.D./General    Surgical Staff:  Circulator: Genesis Redman R.N.  Scrub Person: Elif Barnes    Specimens removed if any:  ID Type Source Tests Collected by Time Destination   A : appendix Tissue Appendix PATHOLOGY SPECIMEN John Cunningham M.D. 8/8/2019 12:22 PM        Estimated Blood Loss: < 20 cc    Findings: enlarged and inflamed appendix    Complications: none      8/8/2019 12:47 PM John Cunningham M.D.

## 2019-08-08 NOTE — ANESTHESIA PROCEDURE NOTES
Airway  Date/Time: 8/8/2019 11:56 AM  Performed by: Olimpia Gonzalez M.D.  Authorized by: Olimpia Gonzalez M.D.     Location:  OR  Urgency:  Elective  Indications for Airway Management:  Anesthesia  Spontaneous Ventilation: absent    Sedation Level:  Deep  Preoxygenated: Yes    Patient Position:  Ramp  Mask Difficulty Assessment:  0 - not attempted  Final Airway Type:  Endotracheal airway  Final Endotracheal Airway:  ETT  Cuffed: Yes    Technique Used for Successful ETT Placement:  Direct laryngoscopy  Devices/Methods Used in Placement:  Intubating stylet  Insertion Site:  Oral  Blade Type:  Ferro  Laryngoscope Blade/Videolaryngoscope Blade Size:  2  ETT Size (mm):  6.5  Leak Pressue (cm H2O):  35  Measured from:  Teeth  ETT to Teeth (cm):  21  Placement Verified by: auscultation and capnometry    Cormack-Lehane Classification:  Grade I - full view of glottis  Number of Attempts at Approach:  1

## 2019-08-08 NOTE — ANESTHESIA QCDR
2019 [facility] Clinical Data Registry (for Quality Improvement)     Postoperative nausea/vomiting risk protocol (Adult = 18 yrs and Pediatric 3-17 yrs)- (430 and 463)  General inhalation anesthetic (NOT TIVA) with PONV risk factors: Yes  Provision of anti-emetic therapy with at least 2 different classes of agents: Yes   Patient DID NOT receive anti-emetic therapy and reason is documented in Medical Record:  N/A    Multimodal Pain Management- (AQI59)  Patient undergoing Elective Surgery (i.e. Outpatient, or ASC, or Prescheduled Surgery prior to Hospital Admission): No  Use of Multimodal Pain Management, two or more drugs and/or interventions, NOT including systemic opioids: N/A  Exception: Documented allergy to multiple classes of analgesics: N/A    PACU assessment of acute postoperative pain prior to Anesthesia Care End- Applies to Patients Age = 18- (ABG7)  Initial PACU pain score is which of the following: < 7/10  Patient unable to report pain score: N/A    Post-anesthetic transfer of care checklist/protocol to PACU/ICU- (426 and 427)  Upon conclusion of case, patient transferred to which of the following locations: PACU/Non-ICU  Use of transfer checklist/protocol: Yes  Exclusion: Service Performed in Patient Hospital Room (and thus did not require transfer): N/A    PACU Reintubation- (AQI31)  General anesthesia requiring endotracheal intubation (ETT) along with subsequent extubation in OR or PACU: Yes  Required reintubation in the PACU: No   Extubation was a planned trial documented in the medical record prior to removal of the original airway device:  N/A    Unplanned admission to ICU related to anesthesia service up through end of PACU care- (MD51)  Unplanned admission to ICU (not initially anticipated at anesthesia start time): No

## 2019-08-08 NOTE — ED TRIAGE NOTES
"Chief Complaint   Patient presents with   • Abdominal Pain     Started at 0100. Reports \"searing pain\" across epigastric area. Reports that abdomen is swelling.     /83   Pulse 79   Temp 36.2 °C (97.2 °F) (Temporal)   Resp 16   Ht 1.575 m (5' 2\")   Wt 67 kg (147 lb 11.3 oz)   SpO2 93%   BMI 27.02 kg/m²     PT to ER for above complaint. Reports that pain woke her up out of sleep. Pr reports vomiting, denies diarrhea.    R /83. HR 79.  L /79. HR 74.    PT reports known regurgitation in the aortic valve.      To lobby, educated on triage process, encouraged to alert staff to any changes.  "

## 2019-08-08 NOTE — ANESTHESIA PREPROCEDURE EVALUATION
64F here for lap appy.    Allergies   Allergen Reactions   • Sulfa Drugs Hives      Relevant Problems   ANESTHESIA   (+) Obstructive sleep apnea syndrome (Likely, pt snores and has been refered for sleep study but has not completed it)   (-) History of anesthesia complications      PULMONARY (within normal limits)      CARDIAC   (+) CAD (coronary artery disease)   (+) Essential hypertension, benign      GI   (+) Gastroesophageal reflux disease         (-) Liver disease   (-) Renal disease      ENDO (within normal limits)     Stress test 7/8/19:  Patient exercised on a Yayo Protocol.    Patient exercised for 8 minutes, 33 seconds, and 10.1 METs.  100% of MPHR: 155  90% of MPHR: 140  85% of MPHR: 132  Peak Heart Rate Achieved: 135  87 % of MPHR.  Maximum /84 mmHg  Functional aerobic impairment: ( -20 ) Active     Test was terminated due to: Fatigue    Observations/Comments: Patient tolerated test well. She denied any chest pain. Vital signs stable and oxygen saturation 97%. Occasional premature ventricular contractions noted.     Test performed by:  Sayra Nunez RN  7/8/2019 1:31 PM      Provider conclusions and analysis:  Resting EKG sinus rhythm left anterior hemiblock.  Adequate exercise tolerance.  Isolated PVCs noted.  With the positive stress test with ST depression in aVL only at peak exercise.  Resolution of ST segment changes in recovery.    [facility] results pending.    Past Medical History:   Diagnosis Date   • Arrhythmia     sinus bradycardia   • Arthritis     hands/ hip-osteo   • Breast cancer (HCC)    • Cancer (HCC) 2015    dcis/ right breast, radiation   • GERD (gastroesophageal reflux disease)    • Heart burn    • High cholesterol    • Hypertension    • Indigestion    • Pain 06/2018    hands   • Pneumonia 2016      No current facility-administered medications on file prior to encounter.      Current Outpatient Medications on File Prior to Encounter   Medication Sig Dispense Refill   •  atorvastatin (LIPITOR) 20 MG Tab Take 20 mg by mouth every evening.     • fluticasone (FLOVENT HFA) 44 MCG/ACT Aerosol Inhale 2 Puffs by mouth 2 times a day as needed.     • aspirin 81 MG EC tablet Take 81 mg by mouth every day.     • verapamil ER (CALAN-SR) 240 MG Tab CR Take 1 Tab by mouth every day. 30 Tab 6   • Cholecalciferol (VITAMIN D3) 5000 units Tab Take 5,000 Units by mouth every evening.     • fluticasone (FLONASE) 50 MCG/ACT nasal spray Spray 1 Spray in nose 2 times a day as needed.     • PARoxetine (PAXIL) 10 MG Tab Take 10 mg by mouth every evening.     • omeprazole (PRILOSEC) 20 MG CPDR Take 20 mg by mouth every evening.        Past Surgical History:   Procedure Laterality Date   • GANGLION EXCISION Left 9/25/2018    Procedure: GANGLION EXCISION-  CYST;  Surgeon: Hieu Salamanca M.D.;  Location: [facility];  Service: Orthopedics   • FINGER ARTHROPLASTY Left 9/25/2018    Procedure: FINGER ARTHROPLASTY- CARPOMETACARPAL;  Surgeon: Hieu Salamanca M.D.;  Location: [facility];  Service: Orthopedics   • FLEXOR TENDON REPAIR Left 9/25/2018    Procedure: FLEXOR TENDON REPAIR-  CARPI RADIALIS;  Surgeon: Hieu Salamanca M.D.;  Location: [facility];  Service: Orthopedics   • FINGER ARTHROPLASTY Right 6/5/2018    Procedure: FINGER ARTHROPLASTY - CARPOMETACARPAL;  Surgeon: Hieu Salamanca M.D.;  Location: [facility];  Service: Orthopedics   • TENDON TRANSFER  6/5/2018    Procedure: TENDON TRANSFER - FLEXOR CARPI RADIALIS;  Surgeon: Hieu Salamanca M.D.;  Location: [facility];  Service: Orthopedics   • PARTIAL MASTECTOMY  4/21/2015    Performed by Anna Licona M.D. at SURGERY SAME DAY [facility] ORS   • LUMPECTOMY  4/21/2015    Performed by Anna Licona M.D. at SURGERY SAME DAY [facility] ORS   • CARPAL TUNNEL RELEASE  2013   • HYSTERECTOMY LAPAROSCOPY  2006   • BREAST BIOPSY  unknown    left benign biopsy   • TONSILLECTOMY        Physical  Exam    Airway   Mallampati: III  TM distance: >3 FB  Neck ROM: full       Cardiovascular   Rhythm: regular  Rate: normal  (-) murmur     Dental - normal exam         Pulmonary   Breath sounds clear to auscultation  (-) wheezes     Abdominal    Neurological - normal exam                 Anesthesia Plan    ASA 3 - emergent   ASA physical status 3 criteria: CAD/stents (> 3 months)ASA physical status emergent criteria: acute peritonitis    Plan - general       Airway plan will be ETT        Induction: intravenous    Postoperative Plan: Postoperative administration of opioids is intended.    Pertinent diagnostic labs and testing reviewed    Informed Consent:    Anesthetic plan and risks discussed with patient.

## 2019-08-08 NOTE — OR NURSING
Pt's VSS; denies N/V; states pain is at tolerable level. Dressing CDI to abd. D/c orders received. IV dc'd. Pt changed into clothing with assistance. Discharge instructions given; pt and family verbalized understanding and questions answered. Patient states ready to d/c home. Prescriptions given. Pt dc'd in w/c with RN in stable condition.

## 2019-08-08 NOTE — ANESTHESIA TIME REPORT
Anesthesia Start and Stop Event Times     Date Time Event    8/8/2019 1140 Ready for Procedure     1145 Anesthesia Start     1306 Anesthesia Stop        Responsible Staff  08/08/19    Name Role Begin End    Olimpia Gonzalez M.D. Anesth 1145 1306        Preop Diagnosis (Free Text):  Pre-op Diagnosis      Acute appendicitis         Preop Diagnosis (Codes):    Post op Diagnosis  Acute appendicitis      Premium Reason  Non-Premium    Comments:

## 2019-08-08 NOTE — OP REPORT
DATE OF OPERATION: 8/8/2019     PREOPERATIVE DIAGNOSIS: Acute appendicitis.     POSTOPERATIVE DIAGNOSIS: Acute appendicitis.     PROCEDURE PERFORMED: Laparoscopic appendectomy.     SURGEON: John Cunningham MD, [facility]    ANESTHESIOLOGIST: Olimpia Gonzalez MD    ANESTHESIA: General endotracheal anesthesia. Local 30 cc 0.5 % marcaine with epi.    INDICATIONS: The patient is a 64 y.o.-year-old female with clinical and radiographic findings of acute appendicitis. She  is taken to the operating room for laparoscopic appendectomy.     FINDINGS: The appendix was enlarged and inflamed appendix .    SPECIMEN: Appendix.     ESTIMATED BLOOD LOSS: < 20 mL.     PROCEDURE: Following informed consent, the patient was properly identified, taken to the operating room and placed in supine position where general endotracheal anesthesia was administered. Intravenous antibiotics were administered by the anesthesiologist in correct time interval. Sequential compression devices were employed. The abdomen was prepped and draped into a sterile field.     Marcaine 0.5% with epinephrine was used to infiltrate the port sites. A  infraumbilical midline incision was made and subcutaneous tissue spread bluntly. The fascia was cleaned and incised. A clamp was inserted and spread. An 0 Vicryl suture was placed in a figure eight fashion and a 5mm port was inseted. Carbon dioxide pneumoperitoneum was instilled.     A 5 mm lens and camera were inserted. A 12 mm port was placed in the suprapubic position under direct vision. A 5 mm port was placed in right abdomen under direct vision. The appendix was identified and elevated. The filmy adhesions were taken down bluntly. The appendix was divided at its base with a single firing of an Endo-HILLARY stapler with a blue load. The appendiceal mesentery was then taken down with a firing of the Endo-HILLARY stapler with vascular load. Bleeding from the staple lines was treated with clips with cessation of  bleeding.    The appendix was placed within an Endocatch bag and delivered intact from the abdominal cavity and submitted for permanent pathology. The resection site was inspected and irrigated. Hemostasis was satisfactory. All excess irrigant fluid was evacuated from the abdominal cavity. The 12 mm port was removed and an Endoclose with an 0 Vicryl was   used to reapproximate the fascia.     The ports were then removed, and the abdomen desufflated. The port sites were closed with interrupted 4-0 Vicryl subcuticular sutures. Steri-Strips with benzoin were applied beneath Band-Aids.     The patient tolerated the procedure well and there were no apparent complication. All sponge, needle, and instrument counts were correct on 2 separate occasions. She  was awakened, extubated, and transferred to the recovery room in satisfactory condition.       ____________________________________   THANH GREY MD       DD: 8/8/2019  12:55 PM

## 2019-08-08 NOTE — DISCHARGE INSTRUCTIONS
ACTIVITY: Rest and take it easy for the first 24 hours.  A responsible adult is recommended to remain with you during that time.  It is normal to feel sleepy.  We encourage you to not do anything that requires balance, judgment or coordination.    MILD FLU-LIKE SYMPTOMS ARE NORMAL. YOU MAY EXPERIENCE GENERALIZED MUSCLE ACHES, THROAT IRRITATION, HEADACHE AND/OR SOME NAUSEA.    FOR 24 HOURS DO NOT:  Drive, operate machinery or run household appliances.  Drink beer or alcoholic beverages.   Make important decisions or sign legal documents.    SPECIAL INSTRUCTIONS:   Patient to remove dressing in 48 - 72 hrs.  No lifting > 20 lbs x 4 weeks.  OK to shower when dressing removed / no bath x 2 weeks.  Follow up in office in 10 - 14 days    Ice pack to incisions intermittently to reduce swelling and pain        Laparoscopic Appendectomy, Adult  A laparoscopic appendectomy is a surgery to take out the appendix. The appendix is a finger-like structure that is attached to the large intestine. In this surgery, the appendix is removed through two or three small cuts (incisions) with the help of a thin, lighted tube that has a tiny camera on the end (laparoscope).  A laparoscopic appendectomy may be done to prevent an inflamed appendix from bursting (rupturing). Or, it may be done to treat the infection from an appendix that has already ruptured. It is usually done immediately after inflammation of the appendix (appendicitis) is diagnosed.  Tell a health care provider about:  · Any allergies you have.  · All medicines you are taking, including vitamins, herbs, eye drops, creams, and over-the-counter medicines.  · Any problems you or family members have had with anesthetic medicines.  · Any blood disorders you have.  · Any surgeries you have had.  · Any medical conditions you have.  · Whether you are pregnant or may be pregnant.  What are the risks?  Generally, this is a safe procedure. However, problems may occur,  including:  · Infection.  · Bleeding.  · Allergic reactions to medicines.  · Damage to other structures or organs.  · The formation of collections of pus (abscesses).  · Long-lasting pain or scarring at the incision sites or inside the abdomen.  · Blood clots in the legs.  What happens before the procedure?  · Follow instructions from your health care provider about eating or drinking restrictions.  · Ask your health care provider about:  ¨ Changing or stopping your regular medicines. This is especially important if you are taking diabetes medicines or blood thinners.  ¨ Taking medicines such as aspirin and ibuprofen. These medicines can thin your blood. Do not take these medicines before your procedure if your health care provider instructs you not to.  · Ask your health care provider how your surgical site will be marked or identified.  · You may be given antibiotic medicine to help prevent infection or to treat existing inflammation or infection.  · If you will be going home on the same day as your surgery, plan to have someone with you for 24 hours.  What happens during the procedure?  · To reduce your risk of infection:  ¨ Your health care team will wash or sanitize their hands.  ¨ Your skin will be washed with soap.  · An IV tube will be inserted into one of your veins. You will receive medicine and fluids through this tube.  · You will be given one or more of the following:  ¨ A medicine to help you relax (sedative).  ¨ A medicine to numb the area (local anesthetic).  ¨ A medicine to make you fall asleep (general anesthetic).  ¨ A medicine that is injected into your spine to numb the area below and slightly above the injection site (spinal anesthetic).  ¨ A medicine that is injected into an area of your body to numb everything below the injection site (regional anesthetic).  · A thin, flexible tube (catheter) may be put into your bladder to drain urine.  · A tube may be passed through your nose and into your  stomach (NG tube, or nasogastric tube) to drain any stomach contents.  · Your surgeon will make two or three small incisions near your belly button (navel).  · Air-like gas will be used to fill your abdomen. The gas will make your abdomen expand. This helps the surgeon to see clearly and gives him or her more room to work.  · A laparoscope will be passed through one of the incisions.  · Other long, thin surgical instruments will be passed through the other incisions.  · The appendix will be located and removed through one of the incisions.  · The abdomen may be washed out to remove bacteria.  · The incisions will be closed with stitches (sutures), staples, or adhesive strips.  · A bandage (dressing) may be used to cover the incisions.  · If a tube was inserted into your bladder or stomach, it will be removed.  What happens after the procedure?  · Your blood pressure, heart rate, breathing rate, and blood oxygen level will be monitored often until the medicines you were given have worn off.  · You will be given pain medicine as needed to keep you comfortable.  · If your appendix did not rupture, you may be able to go home the same day as your surgery.  · Do not drive for 24 hours if you received a sedative.  · If your appendix ruptured:  ¨ You will get antibiotic medicine through an IV tube.  ¨ You may be sent home with a temporary drain.    DIET: To avoid nausea, slowly advance diet as tolerated, avoiding spicy or greasy foods for the first day.  Add more substantial food to your diet according to your physician's instructions.  Babies can be fed formula or breast milk as soon as they are hungry.  INCREASE FLUIDS AND FIBER TO AVOID CONSTIPATION.    SURGICAL DRESSING/BATHING: See Above    FOLLOW-UP APPOINTMENT:  A follow-up appointment should be arranged with your doctor in 10-14 days; call to schedule.    You should CALL YOUR PHYSICIAN if you develop:  Fever greater than 101 degrees F.  Pain not relieved by  medication, or persistent nausea or vomiting.  Excessive bleeding (blood soaking through dressing) or unexpected drainage from the wound.  Extreme redness or swelling around the incision site, drainage of pus or foul smelling drainage.  Inability to urinate or empty your bladder within 8 hours.  Problems with breathing or chest pain.    You should call 911 if you develop problems with breathing or chest pain.  If you are unable to contact your doctor or surgical center, you should go to the nearest emergency room or urgent care center.  Physician's telephone #: Dr. Cunningham [phone]    If any questions arise, call your doctor.  If your doctor is not available, please feel free to call the Surgical Center at [phone].  The Center is open Monday through Friday from 7AM to 7PM.  You can also call the [employer] HOTLINE open 24 hours/day, 7 days/week and speak to a nurse at [phone], or toll free at [phone].    A registered nurse may call you a few days after your surgery to see how you are doing after your procedure.    MEDICATIONS: Resume taking daily medication.  Take prescribed pain medication with food.  If no medication is prescribed, you may take non-aspirin pain medication if needed.  PAIN MEDICATION CAN BE VERY CONSTIPATING.  Take a stool softener or laxative such as senokot, pericolace, or milk of magnesia if needed.    Prescription given for Oxycodone (pain).  Last pain medication given at 1:20pm, next dose may be taken around 5:20 PM.    If your physician has prescribed pain medication that includes Acetaminophen (Tylenol), do not take additional Acetaminophen (Tylenol) while taking the prescribed medication.    Depression / Suicide Risk    As you are discharged from this [facility] facility, it is important to learn how to keep safe from harming yourself.    Recognize the warning signs:  · Abrupt changes in personality, positive or negative- including increase in energy   · Giving away  possessions  · Change in eating patterns- significant weight changes-  positive or negative  · Change in sleeping patterns- unable to sleep or sleeping all the time   · Unwillingness or inability to communicate  · Depression  · Unusual sadness, discouragement and loneliness  · Talk of wanting to die  · Neglect of personal appearance   · Rebelliousness- reckless behavior  · Withdrawal from people/activities they love  · Confusion- inability to concentrate     If you or a loved one observes any of these behaviors or has concerns about self-harm, here's what you can do:  · Talk about it- your feelings and reasons for harming yourself  · Remove any means that you might use to hurt yourself (examples: pills, rope, extension cords, firearm)  · Get professional help from the community (Mental Health, Substance Abuse, psychological counseling)  · Do not be alone:Call your Safe Contact- someone whom you trust who will be there for you.  · Call your local CRISIS HOTLINE 902-5370 or 140-200-1655  · Call your local Children's Mobile Crisis Response Team [place] (445) 490-1780 or www.[employer]  · Call the toll free National Suicide Prevention Hotlines   · National Suicide Prevention Lifeline 570-613-OZUT (8246)  · National Hope Line Network 800-SUICIDE (097-0916)

## 2019-08-09 ASSESSMENT — PAIN SCALES - GENERAL: PAIN_LEVEL: 0

## 2019-08-09 NOTE — ANESTHESIA POSTPROCEDURE EVALUATION
Patient: Haley Hawthorne    Procedure Summary     Date:  08/08/19 Room / Location:  [facility] 09 / SURGERY [facility]    Anesthesia Start:  1145 Anesthesia Stop:  1306    Procedure:  APPENDECTOMY, LAPAROSCOPIC (Abdomen) Diagnosis:  ( Acute appendicitis )    Surgeon:  John Cunningham M.D. Responsible Provider:  Olimpia Gonzalez M.D.    Anesthesia Type:  general ASA Status:  3 - Emergent          Final Anesthesia Type: general  Last vitals  BP   Blood Pressure : 110/50, NIBP: 101/55    Temp   36.3 °C (97.4 °F)    Pulse   Pulse: 77, Heart Rate (Monitored): 74   Resp   16    SpO2   98 %      Anesthesia Post Evaluation    Patient location during evaluation: PACU  Patient participation: complete - patient participated  Level of consciousness: awake and alert  Pain score: 0    Airway patency: patent  Anesthetic complications: no  Cardiovascular status: hemodynamically stable  Respiratory status: acceptable  Hydration status: euvolemic    PONV: none

## 2019-10-14 ENCOUNTER — SLEEP CENTER VISIT (OUTPATIENT)
Dept: SLEEP MEDICINE | Facility: MEDICAL CENTER | Age: 64
End: 2019-10-14
Payer: MEDICARE

## 2019-10-14 VITALS
RESPIRATION RATE: 15 BRPM | WEIGHT: 145 LBS | HEIGHT: 62 IN | HEART RATE: 54 BPM | DIASTOLIC BLOOD PRESSURE: 78 MMHG | OXYGEN SATURATION: 95 % | SYSTOLIC BLOOD PRESSURE: 118 MMHG | BODY MASS INDEX: 26.68 KG/M2

## 2019-10-14 DIAGNOSIS — G47.30 SLEEP DISORDER BREATHING: ICD-10-CM

## 2019-10-14 PROCEDURE — 99204 OFFICE O/P NEW MOD 45 MIN: CPT | Performed by: FAMILY MEDICINE

## 2019-10-14 NOTE — PROGRESS NOTES
"     [facility]  Consult Note     Date: 10/14/2019 / Time: 10:54 AM    Patient ID:   Name:             Haley Pitts   YOB: 1955  Age:                 64 y.o.  female   MRN:               [id number]      Thank you for requesting a sleep medicine consultation on Haley Hawthorne at the sleep center. She presents today with the chief complaints of snoring, and low oxygen. She is referred by  for evaluation and treatment of sleep disorder breathing.     HISTORY OF PRESENT ILLNESS:       At night,  Haley Hawthorne goes to bed around 9 pm on weekdays and on the weekends. She gets out of bed at 8 am on weekdays and on the weekends.  She averages about 9 hrs of sleep on a good night and 8 hrs on a bad night.  She falls asleep within 5 minutes. She awakens 2-3 times during the night due to bathroom use.     As per suppose questioner,She is aware of snoring/breathing pauses/gasping or choking in sleep.  She  denies any symptoms of restless legs syndrome such as an \"urge to move\"  She  legs in the evening or bedtime. She  denies any symptoms of narcolepsy such as sleep paralysis or cataplexy, or any symptoms to suggest parasomnias such as sleep walking or acting out of dreams.     Overall, she does not finds her sleep refreshing. In terms of  excessive daytime sleepiness,  She complains of sleepiness  while reading or watching TV however denies while driving. [place] sleepiness scale score is normal at  2/24.She does not take naps.     REVIEW OF SYSTEMS:       Constitutional: Denies fevers, Denies weight changes  Eyes: Denies changes in vision, no eye pain  Ears/Nose/Throat/Mouth: Denies nasal congestion or sore throat   Cardiovascular: Denies chest pain or palpitations   Respiratory: Denies shortness of breath , Denies cough  Gastrointestinal/Hepatic: Denies abdominal pain, nausea, vomiting, diarrhea,   Musculoskeletal/Rheum: Denies  joint pain and " swelling   Skin/Breast: Denies rash  Neurological: Denies headache, confusion, memory loss or focal weakness/parasthesias  Psychiatric: denies mood disorder     Comprehensive review of systems form is reviewed with the patient and is attached in the EMR.     PMH:  has a past medical history of Arrhythmia, Arthritis, Asthma, Back pain, Breast cancer (HCC), Bronchitis, Cancer (HCC) (2015), Chickenpox, GERD (gastroesophageal reflux disease), Heart burn, High cholesterol, Hypertension, Indigestion, Nasal drainage, Osteoporosis, Pain (06/2018), Pneumonia (2016), and Tonsillitis.  MEDS:   Current Outpatient Medications:   •  atorvastatin (LIPITOR) 20 MG Tab, Take 20 mg by mouth every evening., Disp: , Rfl:   •  fluticasone (FLOVENT HFA) 44 MCG/ACT Aerosol, Inhale 2 Puffs by mouth 2 times a day as needed., Disp: , Rfl:   •  aspirin 81 MG EC tablet, Take 81 mg by mouth every day., Disp: , Rfl:   •  verapamil ER (CALAN-SR) 240 MG Tab CR, Take 1 Tab by mouth every day., Disp: 30 Tab, Rfl: 6  •  fluticasone (FLONASE) 50 MCG/ACT nasal spray, Spray 1 Spray in nose 2 times a day as needed., Disp: , Rfl:   •  PARoxetine (PAXIL) 10 MG Tab, Take 10 mg by mouth every evening., Disp: , Rfl:   •  Cholecalciferol (VITAMIN D3) 5000 units Tab, Take 5,000 Units by mouth every evening., Disp: , Rfl:   •  omeprazole (PRILOSEC) 20 MG CPDR, Take 20 mg by mouth every evening., Disp: , Rfl:   ALLERGIES:   Allergies   Allergen Reactions   • Sulfa Drugs Hives     SURGHX:   Past Surgical History:   Procedure Laterality Date   • PB LAP,APPENDECTOMY  8/8/2019    Procedure: APPENDECTOMY, LAPAROSCOPIC;  Surgeon: John Cunningham M.D.;  Location: SURGERY [facility];  Service: General   • GANGLION EXCISION Left 9/25/2018    Procedure: GANGLION EXCISION-  CYST;  Surgeon: Hieu Salamanca M.D.;  Location: SURGERY [facility];  Service: Orthopedics   • FINGER ARTHROPLASTY Left 9/25/2018    Procedure: FINGER ARTHROPLASTY- CARPOMETACARPAL;   "Surgeon: Hieu Salamanca M.D.;  Location: [facility];  Service: Orthopedics   • FLEXOR TENDON REPAIR Left 9/25/2018    Procedure: FLEXOR TENDON REPAIR-  CARPI RADIALIS;  Surgeon: Hieu Salamanca M.D.;  Location: [facility];  Service: Orthopedics   • FINGER ARTHROPLASTY Right 6/5/2018    Procedure: FINGER ARTHROPLASTY - CARPOMETACARPAL;  Surgeon: Hieu Salamanca M.D.;  Location: SURGERY [facility];  Service: Orthopedics   • TENDON TRANSFER  6/5/2018    Procedure: TENDON TRANSFER - FLEXOR CARPI RADIALIS;  Surgeon: Hieu Salamanca M.D.;  Location: [facility];  Service: Orthopedics   • PARTIAL MASTECTOMY  4/21/2015    Performed by Anna Licona M.D. at SURGERY SAME DAY [facility]   • LUMPECTOMY  4/21/2015    Performed by Anna Licona M.D. at SURGERY SAME DAY [facility]   • CARPAL TUNNEL RELEASE  2013   • HYSTERECTOMY LAPAROSCOPY  2006   • BREAST BIOPSY  unknown    left benign biopsy   • TONSILLECTOMY       SOCHX:  reports that she quit smoking about 40 years ago. Her smoking use included cigarettes. She has a 10.00 pack-year smoking history. She has never used smokeless tobacco. She reports that she drinks alcohol. She reports that she does not use drugs.   FH:   Family History   Problem Relation Age of Onset   • Stroke Mother    • Cancer Sister    • Cancer Brother    • Sleep Apnea Son            Physical Exam:  Vitals/ General Appearance:   Weight/BMI: Body mass index is 26.52 kg/m².  /78 (BP Location: Left arm, Patient Position: Sitting, BP Cuff Size: Adult)   Pulse (!) 54   Resp 15   Ht 1.575 m (5' 2\")   Wt 65.8 kg (145 lb)   SpO2 95%   Vitals:    10/14/19 1044   BP: 118/78   BP Location: Left arm   Patient Position: Sitting   BP Cuff Size: Adult   Pulse: (!) 54   Resp: 15   SpO2: 95%   Weight: 65.8 kg (145 lb)   Height: 1.575 m (5' 2\")       Pt. is alert and oriented to time, place and person. Cooperative and in no apparent distress.       -Head: " Atraumatic, normocephalic.   -. Ears: Normal tympanic membrane and no discharge  -. Nose: No inferior turbinate hypertophy, no septal deviation   -. Throat: Oropharynx appears crowded in that the palate is  overhanging (Malam Larissa scale 3. Tonsils are not enlarged, uvula is large, pharynx not inflamed.   -. Neck: Supple. No thyromegaly  -. Chest: Trachea central, no spine deformity   -. Lungs auscultation: B/L good air entry, vesicular breath sounds, no adventitious sounds  -. Heart auscultation: 1st and 2nd heart sounds normal, regular rhythm. No appreciable murmur.  - Extremities: no clubbing, no pedal edema.  - Skin: No rash  - NEUROLOGICAL EXAMINATION: On neurological exam, the patient was alert and oriented x3. speech was clear and fluent without dysarthria.      INVESTIGATIONS:       ASSESSMENT AND PLAN     1. She  has symptoms of Obstructive Sleep Apnea (JOANIE). She  has excessive daytime sleepiness that  interferes with activites of daily living.     We have discussed diagnostic options including in-laboratory, attended polysomnography and home sleep testing. We have also discussed treatment options including airway pressurization, reconstructive otolaryngologic surgery, dental appliances and weight management.       Subsequently,treatment options will be discussed based on the diagnostic study. Meanwhile, She is urged to avoid supine sleep, weight gain and alcoholic beverages since all of these can worsen JOANIE. She is cautioned against drowsy driving. If She feels sleepy while driving, She must pull over for a break/nap, rather than persist on the road, in the interest of She own safety and that of others on the road.    Plan  -  She  will be scheduled for an overnight PSG to assess sleep related breathing disorder.    2. Regarding treatment of other past medical problems and general health maintenance,  She is urged to follow up with PCP.

## 2019-10-23 ENCOUNTER — SLEEP STUDY (OUTPATIENT)
Dept: SLEEP MEDICINE | Facility: MEDICAL CENTER | Age: 64
End: 2019-10-23
Attending: FAMILY MEDICINE
Payer: MEDICARE

## 2019-10-23 DIAGNOSIS — G47.30 SLEEP DISORDER BREATHING: ICD-10-CM

## 2019-10-23 PROCEDURE — 95810 POLYSOM 6/> YRS 4/> PARAM: CPT | Performed by: FAMILY MEDICINE

## 2019-10-24 NOTE — PROCEDURES
Comments:  The patient underwent a diagnostic polysomnogram using the standard montage for measurement of parameters of sleep, respiratory events, movement abnormalities, and heart rate and rhythm.    A microphone was used to monitor snoring.    Interpretation:  Study start time was 08:41:26 PM.  Diagnostic recording time was 8h 38.5m with a total sleep time of 6h 4.0m resulting in a sleep efficiency of 70.20%%.    Sleep latency from the start of the study was 98 minutes and the latency from sleep to REM was 229 minutesIn total,45  arousals were scored for an arousal index of 7.4. Sleep stages showed decreased SE, increased WASo of 56 min, increased N3 and decreased REM sleep.    Respiratory:  There were a total of 0 apneas consisting of 0 obstructive apneas, 0 mixed apneas, and 0 central apneas.  A total of 7 hypopneas were scored.The apnea index was 0.00 per hour and the hypopnea index was 1.15 per hour resulting in an overall AHI of 1.15.AHI during rem was 1.6 and AHI while supine was 0.00.    Oximetry:  There was a mean oxygen saturation of 94.0% with a minimum oxygen saturation of 85.0%.  Time spent with oxygen saturations below 89% was 3.6 minutes.    Cardiac:  The highest heart rate seen while awake was 67 BPM while the highest heart rate during sleep was 67 BPM with an average sleeping heart rate of 51 BPM.    Limb Movements:  There were a total of 234 PLMs during sleep, of which 29 were PLMS arousals.  This resulted in a PLMS index of 38.6 and a PLMS arousal index of 4.8.      Impression:  1.  Normal AHI of 1.2/hr and O2 ran 90 %  2.  PLMS  3.  Normal oximetry     Recommendations:  Clinical correlation is required. This study does not suggest clinically significant obstructive sleep apnea  with a normal AHI of 1.2/hr and normal oximetry.  In general patients with daytime sleepiness are advised to avoid sedatives and do not operate a motor vehicle while drowsy.

## 2019-11-08 ENCOUNTER — SLEEP CENTER VISIT (OUTPATIENT)
Dept: SLEEP MEDICINE | Facility: MEDICAL CENTER | Age: 64
End: 2019-11-08
Payer: MEDICARE

## 2019-11-08 VITALS
RESPIRATION RATE: 16 BRPM | HEIGHT: 62 IN | HEART RATE: 62 BPM | DIASTOLIC BLOOD PRESSURE: 70 MMHG | SYSTOLIC BLOOD PRESSURE: 102 MMHG | BODY MASS INDEX: 26.13 KG/M2 | WEIGHT: 142 LBS | OXYGEN SATURATION: 97 %

## 2019-11-08 DIAGNOSIS — R06.83 SNORING: ICD-10-CM

## 2019-11-08 DIAGNOSIS — I10 HYPERTENSION, UNSPECIFIED TYPE: ICD-10-CM

## 2019-11-08 DIAGNOSIS — Z87.891 FORMER SMOKER: ICD-10-CM

## 2019-11-08 DIAGNOSIS — K21.9 GASTROESOPHAGEAL REFLUX DISEASE, ESOPHAGITIS PRESENCE NOT SPECIFIED: ICD-10-CM

## 2019-11-08 PROCEDURE — 99214 OFFICE O/P EST MOD 30 MIN: CPT | Performed by: INTERNAL MEDICINE

## 2019-11-09 NOTE — PROGRESS NOTES
CC: Snoring and fragmented sleep, suspected sleep apnea hypopnea syndrome.    HPI:   Ms. Campbell was evaluated here on October 14, 2019 for snoring and non-refreshing sleep.  She has a regular bedtime between 8 and 10 PM and arises at about 8 AM.  She does feel that her sleep is fragmented throughout the night and she describes some daytime fatigue without overt somnolence.  Her [place] sleepiness score at the last visit was normal at 2 points.  She returns now to discuss the results of the recent polysomnogram.    In talking with her she has a strong history of gastroesophageal reflux.  She was evaluated by gastroenterology several years ago and was told that she had problems with Rosado's esophagus.  She describes severe acid episodes of heartburn that may occur at night.  The problem has persisted despite daily use of omeprazole.    The polysomnogram dated October 23, 2019 is reviewed with the patient.  It demonstrates some fragmentation of sleep with increased wake after sleep onset and increased sleep onset latency.  There are some periodic limb movements and the periodic limb movement with arousal index is 5.7 events per hour.  The apnea hypopnea index is normal at 1.2 events per hour with only rare episodes of hypopnea.  The lowest arterial oxygen saturation is 90% on room air.        Patient Active Problem List    Diagnosis Date Noted   • Appendicitis, acute 08/08/2019     Priority: High   • Acute postoperative abdominal pain 08/08/2019     Priority: High   • Abnormal stress test 07/12/2019     Priority: Medium   • Other chest pain 06/26/2019     Priority: Medium   • Gastroesophageal reflux disease 08/08/2019   • CAD (coronary artery disease) 08/08/2019   • Obstructive sleep apnea syndrome 08/08/2019   • Snoring 06/26/2019   • Essential hypertension, benign 06/26/2019   • Palpitations 06/26/2019   • Primary osteoarthritis of first carpometacarpal joint of left hand 09/25/2018   • Localized primary  carpometacarpal osteoarthritis, right 06/05/2018   • Malignant neoplasm of female breast (HCC) 04/21/2015       Past Medical History:   Diagnosis Date   • Arrhythmia     sinus bradycardia   • Arthritis     hands/ hip-osteo   • Asthma    • Back pain    • Breast cancer (HCC)    • Bronchitis    • Cancer (HCC) 2015    dcis/ right breast, radiation   • Chickenpox    • GERD (gastroesophageal reflux disease)    • Heart burn    • High cholesterol    • Hypertension    • Indigestion    • Nasal drainage    • Osteoporosis    • Pain 06/2018    hands   • Pneumonia 2016   • Tonsillitis        Past Surgical History:   Procedure Laterality Date   • PB LAP,APPENDECTOMY  8/8/2019    Procedure: APPENDECTOMY, LAPAROSCOPIC;  Surgeon: John Cunningham M.D.;  Location: [facility];  Service: General   • GANGLION EXCISION Left 9/25/2018    Procedure: GANGLION EXCISION-  CYST;  Surgeon: Hieu Salamanca M.D.;  Location: [facility];  Service: Orthopedics   • FINGER ARTHROPLASTY Left 9/25/2018    Procedure: FINGER ARTHROPLASTY- CARPOMETACARPAL;  Surgeon: Hieu Salamanca M.D.;  Location: [facility];  Service: Orthopedics   • FLEXOR TENDON REPAIR Left 9/25/2018    Procedure: FLEXOR TENDON REPAIR-  CARPI RADIALIS;  Surgeon: Hieu Salamanca M.D.;  Location: [facility];  Service: Orthopedics   • FINGER ARTHROPLASTY Right 6/5/2018    Procedure: FINGER ARTHROPLASTY - CARPOMETACARPAL;  Surgeon: Hieu Salamanca M.D.;  Location: [facility];  Service: Orthopedics   • TENDON TRANSFER  6/5/2018    Procedure: TENDON TRANSFER - FLEXOR CARPI RADIALIS;  Surgeon: Hieu Salamanca M.D.;  Location: [facility];  Service: Orthopedics   • PARTIAL MASTECTOMY  4/21/2015    Performed by Anna Licona M.D. at SURGERY SAME DAY [facility]   • LUMPECTOMY  4/21/2015    Performed by Anna Licona M.D. at SURGERY SAME DAY [facility] ORS   • CARPAL TUNNEL RELEASE  2013   • HYSTERECTOMY  LAPAROSCOPY     • BREAST BIOPSY  unknown    left benign biopsy   • TONSILLECTOMY         Family History   Problem Relation Age of Onset   • Stroke Mother    • Cancer Sister    • Cancer Brother    • Sleep Apnea Son        Social History     Socioeconomic History   • Marital status:      Spouse name: Not on file   • Number of children: Not on file   • Years of education: Not on file   • Highest education level: Not on file   Occupational History   • Not on file   Social Needs   • Financial resource strain: Not on file   • Food insecurity:     Worry: Not on file     Inability: Not on file   • Transportation needs:     Medical: Not on file     Non-medical: Not on file   Tobacco Use   • Smoking status: Former Smoker     Packs/day: 1.00     Years: 10.00     Pack years: 10.00     Types: Cigarettes     Last attempt to quit: 1979     Years since quittin.8   • Smokeless tobacco: Never Used   Substance and Sexual Activity   • Alcohol use: Yes     Comment: 3 per week   • Drug use: No   • Sexual activity: Not on file   Lifestyle   • Physical activity:     Days per week: Not on file     Minutes per session: Not on file   • Stress: Not on file   Relationships   • Social connections:     Talks on phone: Not on file     Gets together: Not on file     Attends Mosque service: Not on file     Active member of club or organization: Not on file     Attends meetings of clubs or organizations: Not on file     Relationship status: Not on file   • Intimate partner violence:     Fear of current or ex partner: Not on file     Emotionally abused: Not on file     Physically abused: Not on file     Forced sexual activity: Not on file   Other Topics Concern   • Not on file   Social History Narrative   • Not on file       Current Outpatient Medications   Medication Sig Dispense Refill   • atorvastatin (LIPITOR) 20 MG Tab Take 20 mg by mouth every evening.     • fluticasone (FLOVENT HFA) 44 MCG/ACT Aerosol Inhale 2 Puffs by  "mouth 2 times a day as needed.     • aspirin 81 MG EC tablet Take 81 mg by mouth every day.     • verapamil ER (CALAN-SR) 240 MG Tab CR Take 1 Tab by mouth every day. 30 Tab 6   • PARoxetine (PAXIL) 10 MG Tab Take 10 mg by mouth every evening.     • omeprazole (PRILOSEC) 20 MG CPDR Take 20 mg by mouth every evening.     • Cholecalciferol (VITAMIN D3) 5000 units Tab Take 5,000 Units by mouth every evening.     • fluticasone (FLONASE) 50 MCG/ACT nasal spray Spray 1 Spray in nose 2 times a day as needed.       No current facility-administered medications for this visit.     \"CURRENT RX\"      Allergies: Sulfa drugs      ROS  Unchanged from prior.      Physical Exam:   /70 (BP Location: Left arm, Patient Position: Sitting, BP Cuff Size: Adult)   Pulse 62   Resp 16   Ht 1.575 m (5' 2\")   Wt 64.4 kg (142 lb)   SpO2 97%   BMI 25.97 kg/m²    Head and neck examination demonstrates no mucosal lesion, purulent drainage or evident polyps. The pharynx is benign with a Mallampati I presentation. The neck is supple without thyromegaly. On chest examination there are symmetrical bilateral breath sounds without rales, wheezing or consolidation. On cardiac examination, the apical impulse and heart sounds are normal and the rhythm is regular. There is no murmur, gallop or rub and no jugular venous distention. The abdomen is soft with active bowel sounds and no palpable hepatosplenomegaly, mass, guarding or rebound. The extremities show no clubbing, cyanosis or edema and no signs of deep venous thrombosis. There is no warmth, redness, tenderness or palpable venous cord in the calves. The skin is clear, warm and dry. There is no unusual peripheral lymphadenopathy. Peripheral pulses are palpable in all 4 extremities. On neurologic examination, cranial nerve function is intact, motor tone is symmetrical, and the patient is alert, oriented and responsive.       Problems:  1. Snoring  This is a significant problem for the patient " although it is not associated with sleep disordered breathing.  The polysomnogram demonstrates a normal apnea hypotony index and preservation of arterial oxygen saturation.  We talked about treatment options including dental appliances and otolaryngologic procedures.    2.  Non-refreshing sleep  Does not seem to reflect sleep disordered breathing and its unlikely that the snoring is interfering with sleep continuity.  The periodic limb movement with arousal index is slightly elevated but probably not sufficient to explain her resenting complaints.  She does have symptoms suggesting severe gastroesophageal reflux which certainly could fragment her sleep on a regular basis.    3. Former smoker    4. Hypertension, unspecified type  Controlled with a blood pressure of 102/70 mmHg today.    5. Gastroesophageal reflux disease, esophagitis presence not specified  With her ongoing symptoms and history of Rosado's esophagus I think that follow-up evaluation is required.      Plan:   1.  Sleep disordered breathing is not required based on the results of the recent polysomnogram.    2.  We have discussed treatment options for snoring including dental appliances and otolaryngologic procedures.    3.  Evaluation by gastroenterology for ongoing gastroesophageal reflux which may be contributing to her sleep fragmentation.    4.  Return visit here on an as-needed basis.    We appreciate the opportunity to assist in her care.  No follow-ups on file.

## 2020-02-07 ENCOUNTER — HOSPITAL ENCOUNTER (OUTPATIENT)
Dept: RADIOLOGY | Facility: MEDICAL CENTER | Age: 65
End: 2020-02-07
Attending: FAMILY MEDICINE
Payer: MEDICARE

## 2020-02-07 DIAGNOSIS — Z85.3 PERSONAL HISTORY OF MALIGNANT NEOPLASM OF BREAST: ICD-10-CM

## 2020-02-07 PROCEDURE — 76642 ULTRASOUND BREAST LIMITED: CPT | Mod: RT

## 2020-02-07 PROCEDURE — G0279 TOMOSYNTHESIS, MAMMO: HCPCS

## 2020-05-11 DIAGNOSIS — I10 ESSENTIAL HYPERTENSION, BENIGN: ICD-10-CM

## 2020-05-11 RX ORDER — VERAPAMIL HYDROCHLORIDE 240 MG/1
240 TABLET, FILM COATED, EXTENDED RELEASE ORAL DAILY
Qty: 90 TAB | Refills: 0 | Status: SHIPPED | OUTPATIENT
Start: 2020-05-11 | End: 2020-06-09

## 2020-05-22 DIAGNOSIS — Z01.810 PRE-OPERATIVE CARDIOVASCULAR EXAMINATION: ICD-10-CM

## 2020-05-22 DIAGNOSIS — Z01.812 PRE-OPERATIVE LABORATORY EXAMINATION: ICD-10-CM

## 2020-05-22 LAB
ANION GAP SERPL CALC-SCNC: 10 MMOL/L (ref 7–16)
BUN SERPL-MCNC: 22 MG/DL (ref 8–22)
CALCIUM SERPL-MCNC: 9.6 MG/DL (ref 8.5–10.5)
CHLORIDE SERPL-SCNC: 106 MMOL/L (ref 96–112)
CO2 SERPL-SCNC: 25 MMOL/L (ref 20–33)
CREAT SERPL-MCNC: 0.86 MG/DL (ref 0.5–1.4)
EKG IMPRESSION: NORMAL
GLUCOSE SERPL-MCNC: 90 MG/DL (ref 65–99)
POTASSIUM SERPL-SCNC: 3.9 MMOL/L (ref 3.6–5.5)
SODIUM SERPL-SCNC: 141 MMOL/L (ref 135–145)

## 2020-05-22 PROCEDURE — 93005 ELECTROCARDIOGRAM TRACING: CPT

## 2020-05-22 PROCEDURE — 93010 ELECTROCARDIOGRAM REPORT: CPT | Performed by: INTERNAL MEDICINE

## 2020-05-22 PROCEDURE — 36415 COLL VENOUS BLD VENIPUNCTURE: CPT

## 2020-05-22 PROCEDURE — 80048 BASIC METABOLIC PNL TOTAL CA: CPT

## 2020-05-22 RX ORDER — DIPHENHYDRAMINE HCL 25 MG
25 TABLET ORAL NIGHTLY PRN
COMMUNITY
End: 2021-09-21

## 2020-05-22 SDOH — HEALTH STABILITY: MENTAL HEALTH: HOW MANY STANDARD DRINKS CONTAINING ALCOHOL DO YOU HAVE ON A TYPICAL DAY?: 1 OR 2

## 2020-05-22 SDOH — HEALTH STABILITY: MENTAL HEALTH: HOW OFTEN DO YOU HAVE A DRINK CONTAINING ALCOHOL?: 4 OR MORE TIMES A WEEK

## 2020-05-22 ASSESSMENT — FIBROSIS 4 INDEX: FIB4 SCORE: 2.4

## 2020-05-24 ENCOUNTER — OFFICE VISIT (OUTPATIENT)
Dept: ADMISSIONS | Facility: MEDICAL CENTER | Age: 65
End: 2020-05-24
Attending: OTOLARYNGOLOGY
Payer: MEDICARE

## 2020-05-24 DIAGNOSIS — Z01.812 PRE-OPERATIVE LABORATORY EXAMINATION: ICD-10-CM

## 2020-05-24 LAB — COVID ORDER STATUS COVID19: NORMAL

## 2020-05-24 PROCEDURE — C9803 HOPD COVID-19 SPEC COLLECT: HCPCS

## 2020-05-26 LAB
SARS-COV-2 RNA RESP QL NAA+PROBE: NOT DETECTED
SPECIMEN SOURCE: NORMAL

## 2020-05-27 NOTE — OR NURSING
COVID-19 Pre-surgery screenin. Do you have an undiagnosed respiratory illness or symptoms such as coughing or sneezing?  (Yes/No)  a. Onset of Sx   b. Acute vs. chronic respiratory illness     2. Do you have an unexplained fever greater than 100.4 degrees Fahrenheit or 38 degrees Celsius?      (Yes/No)    3. Have you had direct exposure to a patient who tested positive for Covid-19?    (Yes/No)    4. Have you traveled outside [facility] in the last 14 days?    5. Have you had any lost of taste, smell, N/V or sore throat?    Patient has been informed of no visitor policy and asked to wear a mask upon entering the hospital    (Yes/No)        NO ANSWER   LEFT MESSAGE.

## 2020-05-27 NOTE — OR NURSING
COVID-19 Pre-surgery screenin. Do you have an undiagnosed respiratory illness or symptoms such as coughing or sneezing? No      2. Do you have an unexplained fever greater than 100.4 degrees Fahrenheit or 38 degrees Celsius?     No    3. Have you had direct exposure to a patient who tested positive for Covid-19?    No    4. Have you traveled outside [facility] in the last 14 days?NO    5. Have you had any lost of taste, smell, N/V or sore throat? NO    Patient has been informed of no visitor policy and asked to wear a mask upon entering the hospital    Yes

## 2020-05-28 ENCOUNTER — ANESTHESIA (OUTPATIENT)
Dept: SURGERY | Facility: MEDICAL CENTER | Age: 65
End: 2020-05-28
Payer: MEDICARE

## 2020-05-28 ENCOUNTER — HOSPITAL ENCOUNTER (OUTPATIENT)
Facility: MEDICAL CENTER | Age: 65
End: 2020-05-28
Attending: OTOLARYNGOLOGY | Admitting: OTOLARYNGOLOGY
Payer: MEDICARE

## 2020-05-28 ENCOUNTER — ANESTHESIA EVENT (OUTPATIENT)
Dept: SURGERY | Facility: MEDICAL CENTER | Age: 65
End: 2020-05-28
Payer: MEDICARE

## 2020-05-28 VITALS
RESPIRATION RATE: 20 BRPM | DIASTOLIC BLOOD PRESSURE: 64 MMHG | SYSTOLIC BLOOD PRESSURE: 120 MMHG | HEART RATE: 76 BPM | HEIGHT: 62 IN | WEIGHT: 139.55 LBS | TEMPERATURE: 97.5 F | OXYGEN SATURATION: 96 % | BODY MASS INDEX: 25.68 KG/M2

## 2020-05-28 LAB — PATHOLOGY CONSULT NOTE: NORMAL

## 2020-05-28 PROCEDURE — 160025 RECOVERY II MINUTES (STATS): Performed by: OTOLARYNGOLOGY

## 2020-05-28 PROCEDURE — 502573 HCHG PACK, ENT: Performed by: OTOLARYNGOLOGY

## 2020-05-28 PROCEDURE — 160046 HCHG PACU - 1ST 60 MINS PHASE II: Performed by: OTOLARYNGOLOGY

## 2020-05-28 PROCEDURE — 501404 HCHG SPLINT, NASAL DOYLE AIRWAY: Performed by: OTOLARYNGOLOGY

## 2020-05-28 PROCEDURE — 160041 HCHG SURGERY MINUTES - EA ADDL 1 MIN LEVEL 4: Performed by: OTOLARYNGOLOGY

## 2020-05-28 PROCEDURE — 700111 HCHG RX REV CODE 636 W/ 250 OVERRIDE (IP): Performed by: OTOLARYNGOLOGY

## 2020-05-28 PROCEDURE — 160029 HCHG SURGERY MINUTES - 1ST 30 MINS LEVEL 4: Performed by: OTOLARYNGOLOGY

## 2020-05-28 PROCEDURE — 500331 HCHG COTTONOID, SURG PATTIE: Performed by: OTOLARYNGOLOGY

## 2020-05-28 PROCEDURE — 700102 HCHG RX REV CODE 250 W/ 637 OVERRIDE(OP)

## 2020-05-28 PROCEDURE — 700105 HCHG RX REV CODE 258: Performed by: OTOLARYNGOLOGY

## 2020-05-28 PROCEDURE — 160002 HCHG RECOVERY MINUTES (STAT): Performed by: OTOLARYNGOLOGY

## 2020-05-28 PROCEDURE — 700102 HCHG RX REV CODE 250 W/ 637 OVERRIDE(OP): Performed by: OTOLARYNGOLOGY

## 2020-05-28 PROCEDURE — 501838 HCHG SUTURE GENERAL: Performed by: OTOLARYNGOLOGY

## 2020-05-28 PROCEDURE — 160036 HCHG PACU - EA ADDL 30 MINS PHASE I: Performed by: OTOLARYNGOLOGY

## 2020-05-28 PROCEDURE — A9270 NON-COVERED ITEM OR SERVICE: HCPCS

## 2020-05-28 PROCEDURE — 160048 HCHG OR STATISTICAL LEVEL 1-5: Performed by: OTOLARYNGOLOGY

## 2020-05-28 PROCEDURE — 700101 HCHG RX REV CODE 250: Performed by: ANESTHESIOLOGY

## 2020-05-28 PROCEDURE — 501419 HCHG SPONGE, NASAL MRCL: Performed by: OTOLARYNGOLOGY

## 2020-05-28 PROCEDURE — 88300 SURGICAL PATH GROSS: CPT

## 2020-05-28 PROCEDURE — 700102 HCHG RX REV CODE 250 W/ 637 OVERRIDE(OP): Performed by: ANESTHESIOLOGY

## 2020-05-28 PROCEDURE — 160009 HCHG ANES TIME/MIN: Performed by: OTOLARYNGOLOGY

## 2020-05-28 PROCEDURE — A9270 NON-COVERED ITEM OR SERVICE: HCPCS | Performed by: OTOLARYNGOLOGY

## 2020-05-28 PROCEDURE — 160035 HCHG PACU - 1ST 60 MINS PHASE I: Performed by: OTOLARYNGOLOGY

## 2020-05-28 PROCEDURE — A9270 NON-COVERED ITEM OR SERVICE: HCPCS | Performed by: ANESTHESIOLOGY

## 2020-05-28 PROCEDURE — 700101 HCHG RX REV CODE 250: Performed by: OTOLARYNGOLOGY

## 2020-05-28 PROCEDURE — 700111 HCHG RX REV CODE 636 W/ 250 OVERRIDE (IP): Performed by: ANESTHESIOLOGY

## 2020-05-28 RX ORDER — CEPHALEXIN 500 MG/1
500 CAPSULE ORAL 3 TIMES DAILY
Qty: 30 CAP | Refills: 0 | Status: SHIPPED | OUTPATIENT
Start: 2020-05-28 | End: 2020-06-07

## 2020-05-28 RX ORDER — EPINEPHRINE 1 MG/ML
INJECTION INTRAMUSCULAR; INTRAVENOUS; SUBCUTANEOUS
Status: DISCONTINUED | OUTPATIENT
Start: 2020-05-28 | End: 2020-05-28 | Stop reason: HOSPADM

## 2020-05-28 RX ORDER — MIDAZOLAM HYDROCHLORIDE 1 MG/ML
1 INJECTION INTRAMUSCULAR; INTRAVENOUS
Status: DISCONTINUED | OUTPATIENT
Start: 2020-05-28 | End: 2020-05-28 | Stop reason: HOSPADM

## 2020-05-28 RX ORDER — ONDANSETRON 2 MG/ML
INJECTION INTRAMUSCULAR; INTRAVENOUS PRN
Status: DISCONTINUED | OUTPATIENT
Start: 2020-05-28 | End: 2020-06-01 | Stop reason: HOSPADM

## 2020-05-28 RX ORDER — HALOPERIDOL 5 MG/ML
1 INJECTION INTRAMUSCULAR
Status: DISCONTINUED | OUTPATIENT
Start: 2020-05-28 | End: 2020-05-28 | Stop reason: HOSPADM

## 2020-05-28 RX ORDER — HYDROMORPHONE HYDROCHLORIDE 1 MG/ML
0.4 INJECTION, SOLUTION INTRAMUSCULAR; INTRAVENOUS; SUBCUTANEOUS
Status: DISCONTINUED | OUTPATIENT
Start: 2020-05-28 | End: 2020-05-28 | Stop reason: HOSPADM

## 2020-05-28 RX ORDER — ROCURONIUM BROMIDE 10 MG/ML
INJECTION, SOLUTION INTRAVENOUS PRN
Status: DISCONTINUED | OUTPATIENT
Start: 2020-05-28 | End: 2020-06-01 | Stop reason: HOSPADM

## 2020-05-28 RX ORDER — OXYCODONE HCL 5 MG/5 ML
5 SOLUTION, ORAL ORAL
Status: COMPLETED | OUTPATIENT
Start: 2020-05-28 | End: 2020-05-28

## 2020-05-28 RX ORDER — ONDANSETRON 2 MG/ML
4 INJECTION INTRAMUSCULAR; INTRAVENOUS
Status: DISCONTINUED | OUTPATIENT
Start: 2020-05-28 | End: 2020-05-28 | Stop reason: HOSPADM

## 2020-05-28 RX ORDER — EPINEPHRINE 1 MG/ML
INJECTION INTRAMUSCULAR; INTRAVENOUS; SUBCUTANEOUS
Status: DISCONTINUED
Start: 2020-05-28 | End: 2020-05-28 | Stop reason: HOSPADM

## 2020-05-28 RX ORDER — LABETALOL HYDROCHLORIDE 5 MG/ML
5 INJECTION, SOLUTION INTRAVENOUS
Status: DISCONTINUED | OUTPATIENT
Start: 2020-05-28 | End: 2020-05-28 | Stop reason: HOSPADM

## 2020-05-28 RX ORDER — SODIUM CHLORIDE, SODIUM LACTATE, POTASSIUM CHLORIDE, CALCIUM CHLORIDE 600; 310; 30; 20 MG/100ML; MG/100ML; MG/100ML; MG/100ML
INJECTION, SOLUTION INTRAVENOUS CONTINUOUS
Status: DISCONTINUED | OUTPATIENT
Start: 2020-05-28 | End: 2020-05-28 | Stop reason: HOSPADM

## 2020-05-28 RX ORDER — BACITRACIN ZINC 500 [USP'U]/G
OINTMENT TOPICAL
Status: DISCONTINUED | OUTPATIENT
Start: 2020-05-28 | End: 2020-05-28 | Stop reason: HOSPADM

## 2020-05-28 RX ORDER — MIDAZOLAM HYDROCHLORIDE 1 MG/ML
INJECTION INTRAMUSCULAR; INTRAVENOUS PRN
Status: DISCONTINUED | OUTPATIENT
Start: 2020-05-28 | End: 2020-05-28 | Stop reason: SURG

## 2020-05-28 RX ORDER — LIDOCAINE HYDROCHLORIDE AND EPINEPHRINE 10; 10 MG/ML; UG/ML
INJECTION, SOLUTION INFILTRATION; PERINEURAL
Status: DISCONTINUED
Start: 2020-05-28 | End: 2020-05-28 | Stop reason: HOSPADM

## 2020-05-28 RX ORDER — OXYCODONE HCL 5 MG/5 ML
10 SOLUTION, ORAL ORAL
Status: COMPLETED | OUTPATIENT
Start: 2020-05-28 | End: 2020-05-28

## 2020-05-28 RX ORDER — HYDRALAZINE HYDROCHLORIDE 20 MG/ML
5 INJECTION INTRAMUSCULAR; INTRAVENOUS
Status: DISCONTINUED | OUTPATIENT
Start: 2020-05-28 | End: 2020-05-28 | Stop reason: HOSPADM

## 2020-05-28 RX ORDER — HYDROMORPHONE HYDROCHLORIDE 1 MG/ML
0.2 INJECTION, SOLUTION INTRAMUSCULAR; INTRAVENOUS; SUBCUTANEOUS
Status: DISCONTINUED | OUTPATIENT
Start: 2020-05-28 | End: 2020-05-28 | Stop reason: HOSPADM

## 2020-05-28 RX ORDER — DIPHENHYDRAMINE HYDROCHLORIDE 50 MG/ML
12.5 INJECTION INTRAMUSCULAR; INTRAVENOUS
Status: DISCONTINUED | OUTPATIENT
Start: 2020-05-28 | End: 2020-05-28 | Stop reason: HOSPADM

## 2020-05-28 RX ORDER — LIDOCAINE HYDROCHLORIDE 20 MG/ML
INJECTION, SOLUTION EPIDURAL; INFILTRATION; INTRACAUDAL; PERINEURAL PRN
Status: DISCONTINUED | OUTPATIENT
Start: 2020-05-28 | End: 2020-06-01 | Stop reason: HOSPADM

## 2020-05-28 RX ORDER — DEXAMETHASONE SODIUM PHOSPHATE 4 MG/ML
INJECTION, SOLUTION INTRA-ARTICULAR; INTRALESIONAL; INTRAMUSCULAR; INTRAVENOUS; SOFT TISSUE PRN
Status: DISCONTINUED | OUTPATIENT
Start: 2020-05-28 | End: 2020-05-28 | Stop reason: SURG

## 2020-05-28 RX ORDER — HYDROMORPHONE HYDROCHLORIDE 1 MG/ML
0.1 INJECTION, SOLUTION INTRAMUSCULAR; INTRAVENOUS; SUBCUTANEOUS
Status: DISCONTINUED | OUTPATIENT
Start: 2020-05-28 | End: 2020-05-28 | Stop reason: HOSPADM

## 2020-05-28 RX ORDER — MEPERIDINE HYDROCHLORIDE 25 MG/ML
12.5 INJECTION INTRAMUSCULAR; INTRAVENOUS; SUBCUTANEOUS
Status: DISCONTINUED | OUTPATIENT
Start: 2020-05-28 | End: 2020-05-28 | Stop reason: HOSPADM

## 2020-05-28 RX ORDER — LIDOCAINE HYDROCHLORIDE AND EPINEPHRINE 10; 10 MG/ML; UG/ML
INJECTION, SOLUTION INFILTRATION; PERINEURAL
Status: DISCONTINUED | OUTPATIENT
Start: 2020-05-28 | End: 2020-05-28 | Stop reason: HOSPADM

## 2020-05-28 RX ORDER — CEFAZOLIN SODIUM 1 G/3ML
INJECTION, POWDER, FOR SOLUTION INTRAMUSCULAR; INTRAVENOUS PRN
Status: DISCONTINUED | OUTPATIENT
Start: 2020-05-28 | End: 2020-05-28 | Stop reason: SURG

## 2020-05-28 RX ADMIN — CEFAZOLIN 2 G: 330 INJECTION, POWDER, FOR SOLUTION INTRAMUSCULAR; INTRAVENOUS at 09:52

## 2020-05-28 RX ADMIN — FENTANYL CITRATE 25 MCG: 50 INJECTION INTRAMUSCULAR; INTRAVENOUS at 10:41

## 2020-05-28 RX ADMIN — POVIDONE-IODINE 15 ML: 10 SOLUTION TOPICAL at 08:22

## 2020-05-28 RX ADMIN — PROPOFOL 200 MG: 10 INJECTION, EMULSION INTRAVENOUS at 09:45

## 2020-05-28 RX ADMIN — ONDANSETRON 4 MG: 2 INJECTION INTRAMUSCULAR; INTRAVENOUS at 10:32

## 2020-05-28 RX ADMIN — EPHEDRINE SULFATE 5 MG: 50 INJECTION, SOLUTION INTRAVENOUS at 10:16

## 2020-05-28 RX ADMIN — SUGAMMADEX 200 MG: 100 INJECTION, SOLUTION INTRAVENOUS at 10:34

## 2020-05-28 RX ADMIN — OXYCODONE HYDROCHLORIDE 5 MG: 5 SOLUTION ORAL at 11:22

## 2020-05-28 RX ADMIN — FENTANYL CITRATE 50 MCG: 50 INJECTION INTRAMUSCULAR; INTRAVENOUS at 09:42

## 2020-05-28 RX ADMIN — ROCURONIUM BROMIDE 50 MG: 10 INJECTION, SOLUTION INTRAVENOUS at 09:45

## 2020-05-28 RX ADMIN — SODIUM CHLORIDE, POTASSIUM CHLORIDE, SODIUM LACTATE AND CALCIUM CHLORIDE: 600; 310; 30; 20 INJECTION, SOLUTION INTRAVENOUS at 08:22

## 2020-05-28 RX ADMIN — MIDAZOLAM HYDROCHLORIDE 2 MG: 1 INJECTION, SOLUTION INTRAMUSCULAR; INTRAVENOUS at 09:41

## 2020-05-28 RX ADMIN — EPHEDRINE SULFATE 5 MG: 50 INJECTION, SOLUTION INTRAVENOUS at 09:53

## 2020-05-28 RX ADMIN — DEXAMETHASONE SODIUM PHOSPHATE 4 MG: 4 INJECTION, SOLUTION INTRA-ARTICULAR; INTRALESIONAL; INTRAMUSCULAR; INTRAVENOUS; SOFT TISSUE at 09:50

## 2020-05-28 RX ADMIN — EPHEDRINE SULFATE 5 MG: 50 INJECTION, SOLUTION INTRAVENOUS at 10:08

## 2020-05-28 RX ADMIN — LIDOCAINE HYDROCHLORIDE 100 MG: 20 INJECTION, SOLUTION EPIDURAL; INFILTRATION; INTRACAUDAL at 09:45

## 2020-05-28 ASSESSMENT — FIBROSIS 4 INDEX: FIB4 SCORE: 2.4

## 2020-05-28 NOTE — ANESTHESIA PREPROCEDURE EVALUATION
Relevant Problems   ANESTHESIA   (+) Obstructive sleep apnea syndrome      CARDIAC   (+) CAD (coronary artery disease)   (+) Essential hypertension, benign      GI   (+) Gastroesophageal reflux disease      Other   (+) Malignant neoplasm of female breast (HCC)       Physical Exam    Airway   Mallampati: II  TM distance: >3 FB  Neck ROM: full       Cardiovascular - normal exam  Rhythm: regular  Rate: normal  (-) murmur     Dental - normal exam           Pulmonary - normal exam  Breath sounds clear to auscultation     Abdominal    Neurological - normal exam                 Anesthesia Plan    ASA 3 (See relevant problem list)       Plan - general       Airway plan will be ETT        Induction: intravenous    Postoperative Plan: Postoperative administration of opioids is intended.    Pertinent diagnostic labs and testing reviewed    Informed Consent:    Anesthetic plan and risks discussed with patient.    Use of blood products discussed with: patient whom consented to blood products.

## 2020-05-28 NOTE — OR NURSING
1045 pt arrived from OR via gurney to PACU. Report received from OR RN and anesthesiologist, monitor applied, oral airway in place    1053 pt drowsy/arousable, oral airway DC'd    1122 c/o of pain/discomfort, medicated per MAR, VSS    1155 Pt transferred to phase 2, up to recliner, report given to Key FREEMAN

## 2020-05-28 NOTE — OR SURGEON
Immediate Post OP Note    PreOp Diagnosis: septal deviation, turbinate hypertrophy    PostOp Diagnosis: same     Procedure(s):  SEPTOPLASTY, NOSE - Wound Class: Clean Contaminated  TURBINOPLASTY- SUBMUCOSAL APPROACH - Wound Class: Clean Contaminated    Surgeon(s):  Arnulfo Salgado M.D.    Anesthesiologist/Type of Anesthesia:  Anesthesiologist: George Shirley M.D./General    Surgical Staff:  Circulator: Shelley Zaragoza R.N.; Fadumo Palacios R.N.  Scrub Person: Karin Mcneil    Specimens removed if any:  ID Type Source Tests Collected by Time Destination   A : Septum-nasal Other Other PATHOLOGY SPECIMEN Arnulfo Salgado M.D. 5/28/2020 10:32 AM        Estimated Blood Loss: 25cc    Findings: septal deviation to L and R     Complications: none        5/28/2020 10:36 AM Arnulfo Salgado M.D.

## 2020-05-28 NOTE — ANESTHESIA TIME REPORT
Anesthesia Start and Stop Event Times     Date Time Event    5/28/2020 0931 Ready for Procedure     0939 Anesthesia Start     1048 Anesthesia Stop        Responsible Staff  05/28/20    Name Role Begin End    George Shirley M.D. Anesth 0939 1048        Preop Diagnosis (Free Text):  Pre-op Diagnosis     DEVIATED SEPTUM        Preop Diagnosis (Codes):    Post op Diagnosis  Deviated septum      Premium Reason  Non-Premium    Comments:

## 2020-05-28 NOTE — DISCHARGE INSTRUCTIONS
ACTIVITY: Rest and take it easy for the first 24 hours.  A responsible adult is recommended to remain with you during that time.  It is normal to feel sleepy.  We encourage you to not do anything that requires balance, judgment or coordination.    MILD FLU-LIKE SYMPTOMS ARE NORMAL. YOU MAY EXPERIENCE GENERALIZED MUSCLE ACHES, THROAT IRRITATION, HEADACHE AND/OR SOME NAUSEA.    FOR 24 HOURS DO NOT:  Drive, operate machinery or run household appliances.  Drink beer or alcoholic beverages.   Make important decisions or sign legal documents.    SPECIAL INSTRUCTIONS: *See Dr Salgado'S Post Operative Instructions **    DIET: To avoid nausea, slowly advance diet as tolerated, avoiding spicy or greasy foods for the first day.  Add more substantial food to your diet according to your physician's instructions.  Babies can be fed formula or breast milk as soon as they are hungry.  INCREASE FLUIDS AND FIBER TO AVOID CONSTIPATION.    SURGICAL DRESSING/BATHING: *shower ok as normal  **    FOLLOW-UP APPOINTMENT:  A follow-up appointment should be arranged with your doctor; call to schedule.    You should CALL YOUR PHYSICIAN if you develop:  Fever greater than 101 degrees F.  Pain not relieved by medication, or persistent nausea or vomiting.  Excessive bleeding (blood soaking through dressing) or unexpected drainage from the wound.  Extreme redness or swelling around the incision site, drainage of pus or foul smelling drainage.  Inability to urinate or empty your bladder within 8 hours.  Problems with breathing or chest pain.    You should call 911 if you develop problems with breathing or chest pain.  If you are unable to contact your doctor or surgical center, you should go to the nearest emergency room or urgent care center.  Physician's telephone #: *[phone]**    If any questions arise, call your doctor.  If your doctor is not available, please feel free to call the Surgical Center at [phone].  The Center is open  Monday through Friday from 7AM to 7PM.  You can also call the HEALTH HOTLINE open 24 hours/day, 7 days/week and speak to a nurse at [phone], or toll free at [phone].    A registered nurse may call you a few days after your surgery to see how you are doing after your procedure.    MEDICATIONS: Resume taking daily medication.  Take prescribed pain medication with food.  If no medication is prescribed, you may take non-aspirin pain medication if needed.  PAIN MEDICATION CAN BE VERY CONSTIPATING.  Take a stool softener or laxative such as senokot, pericolace, or milk of magnesia if needed.    Prescription given for *keflex **.  Last pain medication given at *11:20am **.    If your physician has prescribed pain medication that includes Acetaminophen (Tylenol), do not take additional Acetaminophen (Tylenol) while taking the prescribed medication.    Depression / Suicide Risk    As you are discharged from this [facility] facility, it is important to learn how to keep safe from harming yourself.    Recognize the warning signs:  · Abrupt changes in personality, positive or negative- including increase in energy   · Giving away possessions  · Change in eating patterns- significant weight changes-  positive or negative  · Change in sleeping patterns- unable to sleep or sleeping all the time   · Unwillingness or inability to communicate  · Depression  · Unusual sadness, discouragement and loneliness  · Talk of wanting to die  · Neglect of personal appearance   · Rebelliousness- reckless behavior  · Withdrawal from people/activities they love  · Confusion- inability to concentrate     If you or a loved one observes any of these behaviors or has concerns about self-harm, here's what you can do:  · Talk about it- your feelings and reasons for harming yourself  · Remove any means that you might use to hurt yourself (examples: pills, rope, extension cords, firearm)  · Get professional help from the community (Mental  Health, Substance Abuse, psychological counseling)  · Do not be alone:Call your Safe Contact- someone whom you trust who will be there for you.  · Call your local CRISIS HOTLINE 576-1069 or 379-425-9177  · Call your local Children's Mobile Crisis Response Team [place] (245) 352-7244 or www.CBRITE  · Call the toll free National Suicide Prevention Hotlines   · National Suicide Prevention Lifeline 190-681-CGEW (1635)  · National Hope Line Network 800-SUICIDE (791-7459)

## 2020-05-28 NOTE — ANESTHESIA PROCEDURE NOTES
Airway    Date/Time: 5/28/2020 9:46 AM  Performed by: George Shirley M.D.  Authorized by: George Shirley M.D.     Location:  OR  Urgency:  Elective  Indications for Airway Management:  Anesthesia      Spontaneous Ventilation: absent    Sedation Level:  Deep  Preoxygenated: Yes    Patient Position:  Sniffing  Final Airway Type:  Endotracheal airway  Final Endotracheal Airway:  ETT and CHAY tube  Cuffed: Yes    Technique Used for Successful ETT Placement:  Direct laryngoscopy    Insertion Site:  Oral  Blade Type:  Janene  Laryngoscope Blade/Videolaryngoscope Blade Size:  3  ETT Size (mm):  6.5  Measured from:  Lips  ETT to Lips (cm):  20  Placement Verified by: auscultation and capnometry    Cormack-Lehane Classification:  Grade IIa - partial view of glottis  Number of Attempts at Approach:  1

## 2020-05-28 NOTE — OR NURSING
1215 assumed care of pt report recvd from Yasmin Hewitt pt is awake alert and 92-93% on roomair minimal amount of bleeding to nasal drip pad vss states good pain control and is without nausea tolerated fluids well   1230 called spouse gave dc instructions over the phone all questions answered   1240  dressed iv removed escorted out to car with all belongings acomp with cna with all belongings

## 2020-05-28 NOTE — OP REPORT
DATE OF SERVICE:  05/28/2020    PREOPERATIVE DIAGNOSES:  1.  Septal deviation.  2.  Turbinate hypertrophy.    POSTOPERATIVE DIAGNOSES:  1.  Septal deviation.  2.  Turbinate hypertrophy.    PROCEDURES PERFORMED:  1.  Endoscopic septoplasty.  2.  Bilateral inferior turbinate reduction.    SURGEON:  Arnulfo Salgado MD    ASSISTANT:  None.    ANESTHESIA:  General endotracheal (Dr. Shirley).    ESTIMATED BLOOD LOSS:  25 mL.    SPECIMEN:  Septum.    COMPLICATIONS:  None.    INDICATIONS:  This is a 65-year-old woman who fell recently and since then,   she has had left-sided nasal obstruction.  CT scan revealed a left anterior   septal deviation, right posterior spur.  She has had evidence of right valve   collapse.  After discussing the risks, benefits, and alternatives, she elected   to undergo septoplasty and inferior turbinate reduction.  She understands   that she may need to have a rhinoplasty down the road if her symptoms do not   improve.    FINDINGS:  The septum was deviated to the left anteriorly and to the right   posteriorly with a spur.    PROCEDURE:  Patient was brought to the operating room, intubated by   anesthesia, turned 90 degrees, draped in usual sterile fashion.  A diluted   Betadine was placed in the nose due to COVID.  I then placed 1:1000   epinephrine-soaked pledgets stained with fluorescein in the nasal cavity.    Once vasoconstriction was allowed to take effect, I injected the septum with   1% lidocaine, 1:100,000 epinephrine.  I then made an incision 1 cm back from   the caudal septum, elevated in a subperichondrial/subperiosteal plane on the   left side.  I went through the septum and in doing this, I did transgress   through the right flap, so I left the cartilage intact and then went further   back into the nose about 0.5 cm.  I then went through the septum there and   elevated in a subperichondrial/subperiosteal plane there.  I then resected the   spur from the right side.  The spur that  was jutting into the right nasal   cavity, I resected.  There was a tear in the right flap.  The left flap   remained intact.  I was happy with the dissection with the septal deviation   repair.  I then made a stab incision in the anterior aspect of both inferior   turbinates, placed a Kristan on it, and then elevated in a subperiosteal plane.    I then used the 2 mm microdebrider blade on a setting of 3000 RPMs to resect   the stromal tissue with multiple passes on both sides until it was well   reduced.  I outfractured the inferior turbinates.  At this point, I could   freely drop a freer into the nasal cavity on both sides.  Patient was then   awakened from anesthesia and taken to recovery room in stable condition.    Counts were correct.       ____________________________________     MD MARCIA Peña / NESSA    DD:  05/28/2020 10:42:25  DT:  05/28/2020 11:32:07    D#:  [id number]  Job#:  [id number]

## 2020-06-01 ASSESSMENT — PAIN SCALES - GENERAL: PAIN_LEVEL: 1

## 2020-06-01 NOTE — ANESTHESIA POSTPROCEDURE EVALUATION
Patient: Haley Hawthorne    Procedure Summary     Date:  05/28/20 Room / Location:  [facility] ROOM 22 / SURGERY SAME DAY [facility]    Anesthesia Start:  0939 Anesthesia Stop:  1048    Procedures:       SEPTOPLASTY, NOSE (N/A Nose)      TURBINOPLASTY- SUBMUCOSAL APPROACH (Bilateral Face) Diagnosis:  (DEVIATED SEPTUM)    Surgeon:  Arnulfo Salgado M.D. Responsible Provider:  George Shirley M.D.    Anesthesia Type:  general ASA Status:  3          Final Anesthesia Type: general  Last vitals  /79       Temp 97.3       Pulse 89      Resp 16       SpO2 97%         Anesthesia Post Evaluation    Patient location during evaluation: PACU  Patient participation: complete - patient participated  Level of consciousness: awake and alert  Pain score: 1    Airway patency: patent  Anesthetic complications: no  Cardiovascular status: hemodynamically stable  Respiratory status: acceptable  Hydration status: euvolemic    PONV: none           Nurse Pain Score: 1 (NPRS)

## 2020-06-08 DIAGNOSIS — I10 ESSENTIAL HYPERTENSION, BENIGN: ICD-10-CM

## 2020-06-09 RX ORDER — VERAPAMIL HYDROCHLORIDE 240 MG/1
TABLET, FILM COATED, EXTENDED RELEASE ORAL
Qty: 90 TAB | Refills: 0 | Status: SHIPPED | OUTPATIENT
Start: 2020-06-09 | End: 2020-12-11 | Stop reason: SDUPTHER

## 2020-08-25 ENCOUNTER — HOSPITAL ENCOUNTER (OUTPATIENT)
Dept: LAB | Facility: MEDICAL CENTER | Age: 65
End: 2020-08-25
Attending: FAMILY MEDICINE
Payer: MEDICARE

## 2020-08-25 LAB
ALBUMIN SERPL BCP-MCNC: 4.8 G/DL (ref 3.2–4.9)
ALBUMIN/GLOB SERPL: 2.2 G/DL
ALP SERPL-CCNC: 106 U/L (ref 30–99)
ALT SERPL-CCNC: 33 U/L (ref 2–50)
ANION GAP SERPL CALC-SCNC: 16 MMOL/L (ref 7–16)
APPEARANCE UR: CLEAR
AST SERPL-CCNC: 36 U/L (ref 12–45)
BACTERIA #/AREA URNS HPF: ABNORMAL /HPF
BASOPHILS # BLD AUTO: 0.3 % (ref 0–1.8)
BASOPHILS # BLD: 0.01 K/UL (ref 0–0.12)
BILIRUB SERPL-MCNC: 0.8 MG/DL (ref 0.1–1.5)
BILIRUB UR QL STRIP.AUTO: NEGATIVE
BUN SERPL-MCNC: 20 MG/DL (ref 8–22)
CALCIUM SERPL-MCNC: 9.9 MG/DL (ref 8.5–10.5)
CHLORIDE SERPL-SCNC: 102 MMOL/L (ref 96–112)
CHOLEST SERPL-MCNC: 204 MG/DL (ref 100–199)
CO2 SERPL-SCNC: 22 MMOL/L (ref 20–33)
COLOR UR: ABNORMAL
CREAT SERPL-MCNC: 0.86 MG/DL (ref 0.5–1.4)
EOSINOPHIL # BLD AUTO: 0.04 K/UL (ref 0–0.51)
EOSINOPHIL NFR BLD: 1.2 % (ref 0–6.9)
EPI CELLS #/AREA URNS HPF: ABNORMAL /HPF
ERYTHROCYTE [DISTWIDTH] IN BLOOD BY AUTOMATED COUNT: 43.7 FL (ref 35.9–50)
EST. AVERAGE GLUCOSE BLD GHB EST-MCNC: 100 MG/DL
FASTING STATUS PATIENT QL REPORTED: NORMAL
GLOBULIN SER CALC-MCNC: 2.2 G/DL (ref 1.9–3.5)
GLUCOSE SERPL-MCNC: 72 MG/DL (ref 65–99)
GLUCOSE UR STRIP.AUTO-MCNC: NEGATIVE MG/DL
HBA1C MFR BLD: 5.1 % (ref 0–5.6)
HCT VFR BLD AUTO: 41.9 % (ref 37–47)
HCV AB SER QL: NORMAL
HDLC SERPL-MCNC: 87 MG/DL
HGB BLD-MCNC: 13.7 G/DL (ref 12–16)
HYALINE CASTS #/AREA URNS LPF: ABNORMAL /LPF
IMM GRANULOCYTES # BLD AUTO: 0.01 K/UL (ref 0–0.11)
IMM GRANULOCYTES NFR BLD AUTO: 0.3 % (ref 0–0.9)
KETONES UR STRIP.AUTO-MCNC: 15 MG/DL
LDLC SERPL CALC-MCNC: 102 MG/DL
LEUKOCYTE ESTERASE UR QL STRIP.AUTO: ABNORMAL
LYMPHOCYTES # BLD AUTO: 0.89 K/UL (ref 1–4.8)
LYMPHOCYTES NFR BLD: 26.6 % (ref 22–41)
MCH RBC QN AUTO: 31.1 PG (ref 27–33)
MCHC RBC AUTO-ENTMCNC: 32.7 G/DL (ref 33.6–35)
MCV RBC AUTO: 95.2 FL (ref 81.4–97.8)
MICRO URNS: ABNORMAL
MONOCYTES # BLD AUTO: 0.27 K/UL (ref 0–0.85)
MONOCYTES NFR BLD AUTO: 8.1 % (ref 0–13.4)
NEUTROPHILS # BLD AUTO: 2.12 K/UL (ref 2–7.15)
NEUTROPHILS NFR BLD: 63.5 % (ref 44–72)
NITRITE UR QL STRIP.AUTO: NEGATIVE
NRBC # BLD AUTO: 0 K/UL
NRBC BLD-RTO: 0 /100 WBC
PH UR STRIP.AUTO: 5 [PH] (ref 5–8)
PLATELET # BLD AUTO: 166 K/UL (ref 164–446)
PMV BLD AUTO: 9.9 FL (ref 9–12.9)
POTASSIUM SERPL-SCNC: 4.2 MMOL/L (ref 3.6–5.5)
PROT SERPL-MCNC: 7 G/DL (ref 6–8.2)
PROT UR QL STRIP: NEGATIVE MG/DL
RBC # BLD AUTO: 4.4 M/UL (ref 4.2–5.4)
RBC # URNS HPF: ABNORMAL /HPF
RBC UR QL AUTO: NEGATIVE
SODIUM SERPL-SCNC: 140 MMOL/L (ref 135–145)
SP GR UR STRIP.AUTO: 1.02
TRIGL SERPL-MCNC: 73 MG/DL (ref 0–149)
UROBILINOGEN UR STRIP.AUTO-MCNC: 0.2 MG/DL
WBC # BLD AUTO: 3.3 K/UL (ref 4.8–10.8)
WBC #/AREA URNS HPF: ABNORMAL /HPF

## 2020-08-25 PROCEDURE — 87086 URINE CULTURE/COLONY COUNT: CPT

## 2020-08-25 PROCEDURE — 81001 URINALYSIS AUTO W/SCOPE: CPT

## 2020-08-25 PROCEDURE — 85025 COMPLETE CBC W/AUTO DIFF WBC: CPT

## 2020-08-25 PROCEDURE — 80061 LIPID PANEL: CPT

## 2020-08-25 PROCEDURE — 83036 HEMOGLOBIN GLYCOSYLATED A1C: CPT | Mod: GA

## 2020-08-25 PROCEDURE — 80053 COMPREHEN METABOLIC PANEL: CPT

## 2020-08-25 PROCEDURE — 36415 COLL VENOUS BLD VENIPUNCTURE: CPT

## 2020-08-25 PROCEDURE — 86803 HEPATITIS C AB TEST: CPT

## 2020-08-28 LAB
BACTERIA UR CULT: NORMAL
SIGNIFICANT IND 70042: NORMAL
SITE SITE: NORMAL
SOURCE SOURCE: NORMAL

## 2020-12-09 ENCOUNTER — OFFICE VISIT (OUTPATIENT)
Dept: CARDIOLOGY | Facility: MEDICAL CENTER | Age: 65
End: 2020-12-09
Payer: MEDICARE

## 2020-12-09 VITALS
SYSTOLIC BLOOD PRESSURE: 134 MMHG | DIASTOLIC BLOOD PRESSURE: 82 MMHG | RESPIRATION RATE: 16 BRPM | BODY MASS INDEX: 26.31 KG/M2 | OXYGEN SATURATION: 96 % | HEIGHT: 62 IN | HEART RATE: 71 BPM | WEIGHT: 143 LBS

## 2020-12-09 DIAGNOSIS — I25.10 CORONARY ARTERY DISEASE INVOLVING NATIVE CORONARY ARTERY OF NATIVE HEART WITHOUT ANGINA PECTORIS: ICD-10-CM

## 2020-12-09 DIAGNOSIS — I10 ESSENTIAL HYPERTENSION, BENIGN: ICD-10-CM

## 2020-12-09 DIAGNOSIS — R00.2 PALPITATIONS: ICD-10-CM

## 2020-12-09 DIAGNOSIS — R07.89 OTHER CHEST PAIN: ICD-10-CM

## 2020-12-09 PROCEDURE — 99214 OFFICE O/P EST MOD 30 MIN: CPT | Performed by: INTERNAL MEDICINE

## 2020-12-09 RX ORDER — LISINOPRIL 5 MG/1
5 TABLET ORAL DAILY
Qty: 30 TAB | Refills: 6 | Status: SHIPPED
Start: 2020-12-09 | End: 2021-01-21 | Stop reason: SINTOL

## 2020-12-09 RX ORDER — ATORVASTATIN CALCIUM 40 MG/1
40 TABLET, FILM COATED ORAL DAILY
Qty: 90 TAB | Refills: 3 | Status: SHIPPED | OUTPATIENT
Start: 2020-12-09 | End: 2022-01-10 | Stop reason: SDUPTHER

## 2020-12-09 ASSESSMENT — ENCOUNTER SYMPTOMS
CONSTITUTIONAL NEGATIVE: 1
NEUROLOGICAL NEGATIVE: 1
GASTROINTESTINAL NEGATIVE: 1
INSOMNIA: 1
MUSCULOSKELETAL NEGATIVE: 1
RESPIRATORY NEGATIVE: 1

## 2020-12-09 ASSESSMENT — FIBROSIS 4 INDEX: FIB4 SCORE: 2.49

## 2020-12-09 NOTE — PROGRESS NOTES
Chief Complaint   Patient presents with   • Palpitations   • Coronary Artery Disease   • Chest Pain       Subjective:   Haley Hawthorne is a 65 y.o. female who presents today for follow-up of atypical chest pain.  She has had recent episodes of very localized pain at the base of her left trapezoid and above her left scapula.  These episodes are nonexertional and lasts only seconds.  She is able to walk on [employer] without any chest pain or exercise intolerance.  The patient was initially seen in June, 2019 for atypical chest pain.  A treadmill performed and was 6 objective ischemia with ST depression present only 1 mm.  Subsequent angiography revealed nonobstructive disease with a 30% RCA lesion.    Past Medical History:   Diagnosis Date   • Arrhythmia     sinus bradycardia   • Arthritis     hands/ hip-rheumatoid   • Asthma    • Back pain    • Breast cancer (HCC)    • Bronchitis    • CAD (coronary artery disease)    • Cancer (HCC) 2015    dcis/ right breast, radiation   • Chickenpox    • GERD (gastroesophageal reflux disease)    • Heart burn    • High cholesterol    • Hypertension    • Indigestion    • Nasal drainage    • Osteoporosis    • Pain 06/2018    hands   • Pneumonia 2016   • Snoring    • Tonsillitis      Past Surgical History:   Procedure Laterality Date   • PB REPAIR OF NASAL SEPTUM N/A 5/28/2020    Procedure: SEPTOPLASTY, NOSE;  Surgeon: Arnulfo Salgado M.D.;  Location: SURGERY SAME DAY [facility];  Service: Ent   • TURBINOPLASTY Bilateral 5/28/2020    Procedure: TURBINOPLASTY- SUBMUCOSAL APPROACH;  Surgeon: Arnulfo Salgado M.D.;  Location: SURGERY SAME DAY [facility];  Service: Ent   • PB LAP,APPENDECTOMY  8/8/2019    Procedure: APPENDECTOMY, LAPAROSCOPIC;  Surgeon: John Cunninhgam M.D.;  Location: SURGERY [facility];  Service: General   • GANGLION EXCISION Left 9/25/2018    Procedure: GANGLION EXCISION-  CYST;  Surgeon: Hieu Salamanca M.D.;  Location: SURGERY [facility];  Service: Orthopedics   • FINGER ARTHROPLASTY Left 2018    Procedure: FINGER ARTHROPLASTY- CARPOMETACARPAL;  Surgeon: Hieu Salamanca M.D.;  Location: [facility];  Service: Orthopedics   • FLEXOR TENDON REPAIR Left 2018    Procedure: FLEXOR TENDON REPAIR-  CARPI RADIALIS;  Surgeon: Hieu Salamanca M.D.;  Location: [facility];  Service: Orthopedics   • FINGER ARTHROPLASTY Right 2018    Procedure: FINGER ARTHROPLASTY - CARPOMETACARPAL;  Surgeon: Hieu Salamanca M.D.;  Location: [facility];  Service: Orthopedics   • TENDON TRANSFER  2018    Procedure: TENDON TRANSFER - FLEXOR CARPI RADIALIS;  Surgeon: Hieu Salamanca M.D.;  Location: [facility];  Service: Orthopedics   • PARTIAL MASTECTOMY  2015    Performed by Anna Licona M.D. at SURGERY SAME DAY [facility]   • LUMPECTOMY  2015    Performed by Anna Licona M.D. at SURGERY SAME DAY [facility]   • CARPAL TUNNEL RELEASE     • HYSTERECTOMY LAPAROSCOPY     • BREAST BIOPSY  unknown    left benign biopsy   • TONSILLECTOMY       Family History   Problem Relation Age of Onset   • Stroke Mother    • Cancer Sister    • Cancer Brother    • Sleep Apnea Son      Social History     Socioeconomic History   • Marital status:      Spouse name: Not on file   • Number of children: Not on file   • Years of education: Not on file   • Highest education level: Not on file   Occupational History   • Not on file   Social Needs   • Financial resource strain: Not on file   • Food insecurity     Worry: Not on file     Inability: Not on file   • Transportation needs     Medical: Not on file     Non-medical: Not on file   Tobacco Use   • Smoking status: Former Smoker     Packs/day: 1.00     Years: 10.00     Pack years: 10.00     Types: Cigarettes     Quit date: 1979     Years since quittin.9   • Smokeless tobacco: Never Used   Substance and Sexual Activity   • Alcohol use: Yes      Frequency: 4 or more times a week     Drinks per session: 1 or 2   • Drug use: No   • Sexual activity: Not on file   Lifestyle   • Physical activity     Days per week: Not on file     Minutes per session: Not on file   • Stress: Not on file   Relationships   • Social connections     Talks on phone: Not on file     Gets together: Not on file     Attends Gnosticism service: Not on file     Active member of club or organization: Not on file     Attends meetings of clubs or organizations: Not on file     Relationship status: Not on file   • Intimate partner violence     Fear of current or ex partner: Not on file     Emotionally abused: Not on file     Physically abused: Not on file     Forced sexual activity: Not on file   Other Topics Concern   • Not on file   Social History Narrative   • Not on file     Allergies   Allergen Reactions   • Sulfa Drugs Hives     Outpatient Encounter Medications as of 12/9/2020   Medication Sig Dispense Refill   • atorvastatin (LIPITOR) 40 MG Tab Take 1 Tab by mouth every day. 90 Tab 3   • lisinopril (PRINIVIL) 5 MG Tab Take 1 Tab by mouth every day. 30 Tab 6   • verapamil ER (CALAN-SR) 240 MG Tab CR TAKE ONE TABLET BY MOUTH EVERY DAY 90 Tab 0   • diphenhydrAMINE (BENADRYL) 25 MG Tab Take 25 mg by mouth at bedtime as needed for Sleep.     • PARoxetine (PAXIL) 10 MG Tab Take 10 mg by mouth every evening.     • omeprazole (PRILOSEC) 20 MG CPDR Take 20 mg by mouth every evening.     • fluticasone (FLOVENT HFA) 44 MCG/ACT Aerosol Inhale 2 Puffs by mouth 2 times a day as needed.     • [DISCONTINUED] atorvastatin (LIPITOR) 20 MG Tab Take 20 mg by mouth every evening.       No facility-administered encounter medications on file as of 12/9/2020.      Review of Systems   Constitutional: Negative.    HENT: Negative.    Respiratory: Negative.    Cardiovascular: Positive for chest pain.   Gastrointestinal: Negative.    Musculoskeletal: Negative.    Skin: Negative.    Neurological: Negative.   "  Endo/Heme/Allergies: Negative.    Psychiatric/Behavioral: The patient has insomnia.         Objective:   /82 (BP Location: Left arm, Patient Position: Sitting, BP Cuff Size: Adult)   Pulse 71   Resp 16   Ht 1.575 m (5' 2\")   Wt 64.9 kg (143 lb)   SpO2 96%   BMI 26.16 kg/m²     Physical Exam   Constitutional: She is oriented to person, place, and time. No distress.   HENT:   Head: Normocephalic and atraumatic.   Eyes: Pupils are equal, round, and reactive to light. No scleral icterus.   Neck: No JVD present.   Cardiovascular: Normal rate and regular rhythm.   No murmur heard.  Pulmonary/Chest: No respiratory distress. She has no wheezes.   Abdominal: Soft. She exhibits no distension. There is no abdominal tenderness.   Musculoskeletal:         General: No edema.   Neurological: She is alert and oriented to person, place, and time.   Skin: Skin is warm.   Psychiatric: She has a normal mood and affect.       Assessment:     1. Coronary artery disease involving native coronary artery of native heart without angina pectoris  Lipid Profile   2. Essential hypertension, benign  Lipid Profile   3. Palpitations  Lipid Profile   4. Other chest pain         Medical Decision Making:  Today's Assessment / Status / Plan:   Chest pain: Atypical by history.  She has nonobstructive coronary disease in 1 artery by angiography a year ago.  It is unlikely that her pain is anginal.  Further stress testing is not needed at this time based on her angiographic result    Hypertension: Under good control.  By my reading her blood pressures consistently 145 systolic bilaterally.  We will add lisinopril to her regimen.  She was warned about angioedema and cough.  Return 1 month for blood pressure.    Hyperlipidemia: LDL not at target on current dose of statin.  We will double her atorvastatin to 40 mg a day check lab in 3 months.    Return in 1 month for blood pressure check on lisinopril.  "

## 2020-12-09 NOTE — LETTER
[facility]   [address], [address]  [place] [address]  Phone: [phone]  Fax: [phone]              Haley Hawthorne  1955    Encounter Date: 12/9/2020    Eduardo Lemus M.D.          PROGRESS NOTE:  Chief Complaint   Patient presents with   • Palpitations   • Coronary Artery Disease   • Chest Pain       Subjective:   Haley Hawthorne is a 65 y.o. female who presents today for follow-up of atypical chest pain.  She has had recent episodes of very localized pain at the base of her left trapezoid and above her left scapula.  These episodes are nonexertional and lasts only seconds.  She is able to walk on [employer] without any chest pain or exercise intolerance.  The patient was initially seen in June, 2019 for atypical chest pain.  A treadmill performed and was 6 objective ischemia with ST depression present only 1 mm.  Subsequent angiography revealed nonobstructive disease with a 30% RCA lesion.    Past Medical History:   Diagnosis Date   • Arrhythmia     sinus bradycardia   • Arthritis     hands/ hip-rheumatoid   • Asthma    • Back pain    • Breast cancer (HCC)    • Bronchitis    • CAD (coronary artery disease)    • Cancer (HCC) 2015    dcis/ right breast, radiation   • Chickenpox    • GERD (gastroesophageal reflux disease)    • Heart burn    • High cholesterol    • Hypertension    • Indigestion    • Nasal drainage    • Osteoporosis    • Pain 06/2018    hands   • Pneumonia 2016   • Snoring    • Tonsillitis      Past Surgical History:   Procedure Laterality Date   • PB REPAIR OF NASAL SEPTUM N/A 5/28/2020    Procedure: SEPTOPLASTY, NOSE;  Surgeon: Arnulfo Salgado M.D.;  Location: SURGERY SAME DAY [facility];  Service: Ent   • TURBINOPLASTY Bilateral 5/28/2020    Procedure: TURBINOPLASTY- SUBMUCOSAL APPROACH;  Surgeon: Arnulfo Salgado M.D.;  Location: SURGERY SAME DAY [facility];  Service: Ent   • PB LAP,APPENDECTOMY  8/8/2019       Procedure: APPENDECTOMY, LAPAROSCOPIC;  Surgeon: John Cunningham M.D.;  Location: [facility];  Service: General   • GANGLION EXCISION Left 9/25/2018    Procedure: GANGLION EXCISION-  CYST;  Surgeon: Hieu Salamanca M.D.;  Location: SURGERY [facility];  Service: Orthopedics   • FINGER ARTHROPLASTY Left 9/25/2018    Procedure: FINGER ARTHROPLASTY- CARPOMETACARPAL;  Surgeon: Hieu Salamanca M.D.;  Location: SURGERY [facility];  Service: Orthopedics   • FLEXOR TENDON REPAIR Left 9/25/2018    Procedure: FLEXOR TENDON REPAIR-  CARPI RADIALIS;  Surgeon: Hieu Salamanca M.D.;  Location: [facility];  Service: Orthopedics   • FINGER ARTHROPLASTY Right 6/5/2018    Procedure: FINGER ARTHROPLASTY - CARPOMETACARPAL;  Surgeon: Hieu Salamanca M.D.;  Location: [facility];  Service: Orthopedics   • TENDON TRANSFER  6/5/2018    Procedure: TENDON TRANSFER - FLEXOR CARPI RADIALIS;  Surgeon: Hieu Salamanca M.D.;  Location: [facility];  Service: Orthopedics   • PARTIAL MASTECTOMY  4/21/2015    Performed by Anna Licona M.D. at SURGERY SAME DAY [facility]   • LUMPECTOMY  4/21/2015    Performed by Anna Licona M.D. at SURGERY SAME DAY [facility]   • CARPAL TUNNEL RELEASE  2013   • HYSTERECTOMY LAPAROSCOPY  2006   • BREAST BIOPSY  unknown    left benign biopsy   • TONSILLECTOMY       Family History   Problem Relation Age of Onset   • Stroke Mother    • Cancer Sister    • Cancer Brother    • Sleep Apnea Son      Social History     Socioeconomic History   • Marital status:      Spouse name: Not on file   • Number of children: Not on file   • Years of education: Not on file   • Highest education level: Not on file   Occupational History   • Not on file   Social Needs   • Financial resource strain: Not on file   • Food insecurity     Worry: Not on file     Inability: Not on file   • Transportation needs     Medical: Not on file     Non-medical: Not on  file   Tobacco Use   • Smoking status: Former Smoker     Packs/day: 1.00     Years: 10.00     Pack years: 10.00     Types: Cigarettes     Quit date: 1979     Years since quittin.9   • Smokeless tobacco: Never Used   Substance and Sexual Activity   • Alcohol use: Yes     Frequency: 4 or more times a week     Drinks per session: 1 or 2   • Drug use: No   • Sexual activity: Not on file   Lifestyle   • Physical activity     Days per week: Not on file     Minutes per session: Not on file   • Stress: Not on file   Relationships   • Social connections     Talks on phone: Not on file     Gets together: Not on file     Attends Jehovah's witness service: Not on file     Active member of club or organization: Not on file     Attends meetings of clubs or organizations: Not on file     Relationship status: Not on file   • Intimate partner violence     Fear of current or ex partner: Not on file     Emotionally abused: Not on file     Physically abused: Not on file     Forced sexual activity: Not on file   Other Topics Concern   • Not on file   Social History Narrative   • Not on file     Allergies   Allergen Reactions   • Sulfa Drugs Hives     Outpatient Encounter Medications as of 2020   Medication Sig Dispense Refill   • atorvastatin (LIPITOR) 40 MG Tab Take 1 Tab by mouth every day. 90 Tab 3   • lisinopril (PRINIVIL) 5 MG Tab Take 1 Tab by mouth every day. 30 Tab 6   • verapamil ER (CALAN-SR) 240 MG Tab CR TAKE ONE TABLET BY MOUTH EVERY DAY 90 Tab 0   • diphenhydrAMINE (BENADRYL) 25 MG Tab Take 25 mg by mouth at bedtime as needed for Sleep.     • PARoxetine (PAXIL) 10 MG Tab Take 10 mg by mouth every evening.     • omeprazole (PRILOSEC) 20 MG CPDR Take 20 mg by mouth every evening.     • fluticasone (FLOVENT HFA) 44 MCG/ACT Aerosol Inhale 2 Puffs by mouth 2 times a day as needed.     • [DISCONTINUED] atorvastatin (LIPITOR) 20 MG Tab Take 20 mg by mouth every evening.       No facility-administered encounter medications  "on file as of 12/9/2020.      Review of Systems   Constitutional: Negative.    HENT: Negative.    Respiratory: Negative.    Cardiovascular: Positive for chest pain.   Gastrointestinal: Negative.    Musculoskeletal: Negative.    Skin: Negative.    Neurological: Negative.    Endo/Heme/Allergies: Negative.    Psychiatric/Behavioral: The patient has insomnia.         Objective:   /82 (BP Location: Left arm, Patient Position: Sitting, BP Cuff Size: Adult)   Pulse 71   Resp 16   Ht 1.575 m (5' 2\")   Wt 64.9 kg (143 lb)   SpO2 96%   BMI 26.16 kg/m²     Physical Exam   Constitutional: She is oriented to person, place, and time. No distress.   HENT:   Head: Normocephalic and atraumatic.   Eyes: Pupils are equal, round, and reactive to light. No scleral icterus.   Neck: No JVD present.   Cardiovascular: Normal rate and regular rhythm.   No murmur heard.  Pulmonary/Chest: No respiratory distress. She has no wheezes.   Abdominal: Soft. She exhibits no distension. There is no abdominal tenderness.   Musculoskeletal:         General: No edema.   Neurological: She is alert and oriented to person, place, and time.   Skin: Skin is warm.   Psychiatric: She has a normal mood and affect.       Assessment:     1. Coronary artery disease involving native coronary artery of native heart without angina pectoris  Lipid Profile   2. Essential hypertension, benign  Lipid Profile   3. Palpitations  Lipid Profile   4. Other chest pain         Medical Decision Making:  Today's Assessment / Status / Plan:   Chest pain: Atypical by history.  She has nonobstructive coronary disease in 1 artery by angiography a year ago.  It is unlikely that her pain is anginal.  Further stress testing is not needed at this time based on her angiographic result    Hypertension: Under good control.  By my reading her blood pressures consistently 145 systolic bilaterally.  We will add lisinopril to her regimen.  She was warned about angioedema and cough.  " Return 1 month for blood pressure.    Hyperlipidemia: LDL not at target on current dose of statin.  We will double her atorvastatin to 40 mg a day check lab in 3 months.    Return in 1 month for blood pressure check on lisinopril.          No Recipients

## 2020-12-11 DIAGNOSIS — I10 ESSENTIAL HYPERTENSION, BENIGN: ICD-10-CM

## 2020-12-11 RX ORDER — VERAPAMIL HYDROCHLORIDE 240 MG/1
240 TABLET, FILM COATED, EXTENDED RELEASE ORAL
Qty: 90 TAB | Refills: 3 | Status: SHIPPED | OUTPATIENT
Start: 2020-12-11 | End: 2022-01-21 | Stop reason: SDUPTHER

## 2020-12-22 ENCOUNTER — HOSPITAL ENCOUNTER (OUTPATIENT)
Dept: LAB | Facility: MEDICAL CENTER | Age: 65
End: 2020-12-22
Attending: FAMILY MEDICINE
Payer: MEDICARE

## 2020-12-22 PROCEDURE — U0003 INFECTIOUS AGENT DETECTION BY NUCLEIC ACID (DNA OR RNA); SEVERE ACUTE RESPIRATORY SYNDROME CORONAVIRUS 2 (SARS-COV-2) (CORONAVIRUS DISEASE [COVID-19]), AMPLIFIED PROBE TECHNIQUE, MAKING USE OF HIGH THROUGHPUT TECHNOLOGIES AS DESCRIBED BY CMS-2020-01-R: HCPCS

## 2020-12-22 PROCEDURE — C9803 HOPD COVID-19 SPEC COLLECT: HCPCS

## 2020-12-23 LAB
COVID ORDER STATUS COVID19: NORMAL
SARS-COV-2 RNA RESP QL NAA+PROBE: DETECTED
SPECIMEN SOURCE: ABNORMAL

## 2020-12-31 ENCOUNTER — HOSPITAL ENCOUNTER (EMERGENCY)
Facility: MEDICAL CENTER | Age: 65
End: 2020-12-31
Attending: EMERGENCY MEDICINE
Payer: MEDICARE

## 2020-12-31 VITALS
TEMPERATURE: 97.8 F | SYSTOLIC BLOOD PRESSURE: 119 MMHG | WEIGHT: 137.79 LBS | BODY MASS INDEX: 25.36 KG/M2 | HEIGHT: 62 IN | RESPIRATION RATE: 14 BRPM | HEART RATE: 78 BPM | OXYGEN SATURATION: 98 % | DIASTOLIC BLOOD PRESSURE: 86 MMHG

## 2020-12-31 PROCEDURE — 302449 STATCHG TRIAGE ONLY (STATISTIC)

## 2020-12-31 ASSESSMENT — FIBROSIS 4 INDEX: FIB4 SCORE: 2.49

## 2021-01-01 NOTE — ED TRIAGE NOTES
".  Chief Complaint   Patient presents with   • Cough     COVID (+)     ./86   Pulse 78   Temp 36.6 °C (97.8 °F) (Temporal)   Resp 14   Ht 1.575 m (5' 2\")   Wt 62.5 kg (137 lb 12.6 oz)   SpO2 98%   BMI 25.20 kg/m²     Ambulatory to triage for above, isolated in [facility] on RA.   "

## 2021-01-07 ENCOUNTER — HOSPITAL ENCOUNTER (OUTPATIENT)
Dept: LAB | Facility: MEDICAL CENTER | Age: 66
End: 2021-01-07
Attending: INTERNAL MEDICINE
Payer: MEDICARE

## 2021-01-07 DIAGNOSIS — R00.2 PALPITATIONS: ICD-10-CM

## 2021-01-07 DIAGNOSIS — I25.10 CORONARY ARTERY DISEASE INVOLVING NATIVE CORONARY ARTERY OF NATIVE HEART WITHOUT ANGINA PECTORIS: ICD-10-CM

## 2021-01-07 DIAGNOSIS — I10 ESSENTIAL HYPERTENSION, BENIGN: ICD-10-CM

## 2021-01-07 LAB
CHOLEST SERPL-MCNC: 147 MG/DL (ref 100–199)
FASTING STATUS PATIENT QL REPORTED: NORMAL
HDLC SERPL-MCNC: 43 MG/DL
LDLC SERPL CALC-MCNC: 84 MG/DL
TRIGL SERPL-MCNC: 101 MG/DL (ref 0–149)

## 2021-01-07 PROCEDURE — 36415 COLL VENOUS BLD VENIPUNCTURE: CPT

## 2021-01-07 PROCEDURE — 80061 LIPID PANEL: CPT

## 2021-01-11 ENCOUNTER — TELEPHONE (OUTPATIENT)
Dept: CARDIOLOGY | Facility: MEDICAL CENTER | Age: 66
End: 2021-01-11

## 2021-01-11 NOTE — TELEPHONE ENCOUNTER
----- Message from Eduardo Lemus M.D. sent at 1/11/2021 12:05 PM PST -----  Her cholesterol is much better on an creased dose of statin and is now the target range.  Continue same dose of atorvastatin.

## 2021-01-20 ENCOUNTER — OFFICE VISIT (OUTPATIENT)
Dept: CARDIOLOGY | Facility: MEDICAL CENTER | Age: 66
End: 2021-01-20
Payer: MEDICARE

## 2021-01-20 ENCOUNTER — HOSPITAL ENCOUNTER (OUTPATIENT)
Dept: RADIOLOGY | Facility: MEDICAL CENTER | Age: 66
End: 2021-01-20
Attending: NURSE PRACTITIONER
Payer: MEDICARE

## 2021-01-20 VITALS
HEIGHT: 62 IN | RESPIRATION RATE: 16 BRPM | SYSTOLIC BLOOD PRESSURE: 116 MMHG | OXYGEN SATURATION: 98 % | HEART RATE: 67 BPM | DIASTOLIC BLOOD PRESSURE: 74 MMHG | WEIGHT: 136.6 LBS | BODY MASS INDEX: 25.14 KG/M2

## 2021-01-20 DIAGNOSIS — R05.9 COUGH: ICD-10-CM

## 2021-01-20 DIAGNOSIS — I25.10 NONOBSTRUCTIVE ATHEROSCLEROSIS OF CORONARY ARTERY: ICD-10-CM

## 2021-01-20 DIAGNOSIS — I10 ESSENTIAL HYPERTENSION, BENIGN: ICD-10-CM

## 2021-01-20 PROCEDURE — 71046 X-RAY EXAM CHEST 2 VIEWS: CPT

## 2021-01-20 PROCEDURE — 99214 OFFICE O/P EST MOD 30 MIN: CPT | Performed by: NURSE PRACTITIONER

## 2021-01-20 ASSESSMENT — ENCOUNTER SYMPTOMS
PALPITATIONS: 0
CLAUDICATION: 0
SHORTNESS OF BREATH: 0
WHEEZING: 0
NAUSEA: 0
SPUTUM PRODUCTION: 1
COUGH: 1
WEAKNESS: 0
PND: 0
VOMITING: 0
ORTHOPNEA: 0
HEMOPTYSIS: 0
DIZZINESS: 0

## 2021-01-20 ASSESSMENT — FIBROSIS 4 INDEX: FIB4 SCORE: 2.49

## 2021-01-20 NOTE — PROGRESS NOTES
Chief Complaint   Patient presents with   • Chest Pain   • Hypertension   • Palpitations       Subjective:   Haley Hawthorne is a 65 y.o. female who presents today for hypertension.    She is followed by Dr. Lemus in our clinic, history of hypertension, hyperlipidemia, atypical chest pain angiogram showed no No significant coronary artery disease 7/2019, and breast cancer. Had COVID 12/22    Last OV with Dr. Lemus 12/2020, patient had elevated bp, added lisinopril.     Overall, patient blood pressure is stable. After COVID she developed a cough. Dry and at night productive cough.     Denies any chest pain, sob, dizziness or palpitations.     Past Medical History:   Diagnosis Date   • Arrhythmia     sinus bradycardia   • Arthritis     hands/ hip-rheumatoid   • Asthma    • Back pain    • Breast cancer (HCC)    • Bronchitis    • CAD (coronary artery disease)    • Cancer (HCC) 2015    dcis/ right breast, radiation   • Chickenpox    • GERD (gastroesophageal reflux disease)    • Heart burn    • High cholesterol    • Hypertension    • Indigestion    • Nasal drainage    • Osteoporosis    • Pain 06/2018    hands   • Pneumonia 2016   • Snoring    • Tonsillitis      Past Surgical History:   Procedure Laterality Date   • PB REPAIR OF NASAL SEPTUM N/A 5/28/2020    Procedure: SEPTOPLASTY, NOSE;  Surgeon: Arnulfo Salgado M.D.;  Location: SURGERY SAME DAY [facility] ORS;  Service: Ent   • TURBINOPLASTY Bilateral 5/28/2020    Procedure: TURBINOPLASTY- SUBMUCOSAL APPROACH;  Surgeon: Arnulfo Salgado M.D.;  Location: SURGERY SAME DAY [facility];  Service: Ent   • PB LAP,APPENDECTOMY  8/8/2019    Procedure: APPENDECTOMY, LAPAROSCOPIC;  Surgeon: John Cunningham M.D.;  Location: SURGERY [facility] ORS;  Service: General   • GANGLION EXCISION Left 9/25/2018    Procedure: GANGLION EXCISION-  CYST;  Surgeon: Hieu Salamanca M.D.;  Location: SURGERY [facility]S;  Service: Orthopedics   • FINGER ARTHROPLASTY Left  2018    Procedure: FINGER ARTHROPLASTY- CARPOMETACARPAL;  Surgeon: Hieu Salamanca M.D.;  Location: SURGERY [facility];  Service: Orthopedics   • FLEXOR TENDON REPAIR Left 2018    Procedure: FLEXOR TENDON REPAIR-  CARPI RADIALIS;  Surgeon: Hieu Salamanca M.D.;  Location: [facility];  Service: Orthopedics   • FINGER ARTHROPLASTY Right 2018    Procedure: FINGER ARTHROPLASTY - CARPOMETACARPAL;  Surgeon: Hieu Salamanca M.D.;  Location: SURGERY [facility];  Service: Orthopedics   • TENDON TRANSFER  2018    Procedure: TENDON TRANSFER - FLEXOR CARPI RADIALIS;  Surgeon: Hieu Salamanca M.D.;  Location: [facility];  Service: Orthopedics   • PARTIAL MASTECTOMY  2015    Performed by Anna Licona M.D. at SURGERY SAME DAY [facility]   • LUMPECTOMY  2015    Performed by Anna Licona M.D. at SURGERY SAME DAY [facility]   • CARPAL TUNNEL RELEASE     • HYSTERECTOMY LAPAROSCOPY     • BREAST BIOPSY  unknown    left benign biopsy   • TONSILLECTOMY       Family History   Problem Relation Age of Onset   • Stroke Mother    • Cancer Sister    • Cancer Brother    • Sleep Apnea Son      Social History     Socioeconomic History   • Marital status:      Spouse name: Not on file   • Number of children: Not on file   • Years of education: Not on file   • Highest education level: Not on file   Occupational History   • Not on file   Social Needs   • Financial resource strain: Not on file   • Food insecurity     Worry: Not on file     Inability: Not on file   • Transportation needs     Medical: Not on file     Non-medical: Not on file   Tobacco Use   • Smoking status: Former Smoker     Packs/day: 1.00     Years: 10.00     Pack years: 10.00     Types: Cigarettes     Quit date: 1979     Years since quittin.0   • Smokeless tobacco: Never Used   Substance and Sexual Activity   • Alcohol use: Yes     Frequency: 4 or more times a week     Drinks per  session: 1 or 2   • Drug use: No   • Sexual activity: Not on file   Lifestyle   • Physical activity     Days per week: Not on file     Minutes per session: Not on file   • Stress: Not on file   Relationships   • Social connections     Talks on phone: Not on file     Gets together: Not on file     Attends Religion service: Not on file     Active member of club or organization: Not on file     Attends meetings of clubs or organizations: Not on file     Relationship status: Not on file   • Intimate partner violence     Fear of current or ex partner: Not on file     Emotionally abused: Not on file     Physically abused: Not on file     Forced sexual activity: Not on file   Other Topics Concern   • Not on file   Social History Narrative   • Not on file     Allergies   Allergen Reactions   • Sulfa Drugs Hives     Outpatient Encounter Medications as of 1/20/2021   Medication Sig Dispense Refill   • verapamil ER (CALAN-SR) 240 MG Tab CR Take 1 Tab by mouth every day. 90 Tab 3   • atorvastatin (LIPITOR) 40 MG Tab Take 1 Tab by mouth every day. 90 Tab 3   • lisinopril (PRINIVIL) 5 MG Tab Take 1 Tab by mouth every day. 30 Tab 6   • diphenhydrAMINE (BENADRYL) 25 MG Tab Take 25 mg by mouth at bedtime as needed for Sleep.     • fluticasone (FLOVENT HFA) 44 MCG/ACT Aerosol Inhale 2 Puffs by mouth 2 times a day as needed.     • PARoxetine (PAXIL) 10 MG Tab Take 10 mg by mouth every evening.     • omeprazole (PRILOSEC) 20 MG CPDR Take 20 mg by mouth every evening.       No facility-administered encounter medications on file as of 1/20/2021.      Review of Systems   Constitutional: Negative for malaise/fatigue.   Respiratory: Positive for cough and sputum production. Negative for hemoptysis, shortness of breath and wheezing.    Cardiovascular: Negative for chest pain, palpitations, orthopnea, claudication, leg swelling and PND.   Gastrointestinal: Negative for nausea and vomiting.   Neurological: Negative for dizziness and weakness.  "       Objective:   /74 (BP Location: Left arm, Patient Position: Sitting, BP Cuff Size: Adult)   Pulse 67   Resp 16   Ht 1.575 m (5' 2\")   Wt 62 kg (136 lb 9.6 oz)   SpO2 98%   BMI 24.98 kg/m²     Physical Exam   Constitutional: She is oriented to person, place, and time. She appears well-developed and well-nourished. No distress.   HENT:   Head: Normocephalic and atraumatic.   Eyes: Pupils are equal, round, and reactive to light.   Neck: No JVD present.   Cardiovascular: Normal rate, regular rhythm and normal heart sounds.   No murmur heard.  Pulmonary/Chest: Effort normal and breath sounds normal.   Abdominal: Soft. Bowel sounds are normal. She exhibits no distension.   Musculoskeletal:         General: No edema.   Neurological: She is alert and oriented to person, place, and time.   Skin: Skin is warm and dry. She is not diaphoretic.   Psychiatric: She has a normal mood and affect. Her behavior is normal. Judgment and thought content normal.         Lab Results   Component Value Date/Time    CHOLSTRLTOT 147 01/07/2021 01:04 PM    LDL 84 01/07/2021 01:04 PM    HDL 43 01/07/2021 01:04 PM    TRIGLYCERIDE 101 01/07/2021 01:04 PM       Lab Results   Component Value Date/Time    SODIUM 140 08/25/2020 01:02 PM    POTASSIUM 4.2 08/25/2020 01:02 PM    CHLORIDE 102 08/25/2020 01:02 PM    CO2 22 08/25/2020 01:02 PM    GLUCOSE 72 08/25/2020 01:02 PM    BUN 20 08/25/2020 01:02 PM    CREATININE 0.86 08/25/2020 01:02 PM     Lab Results   Component Value Date/Time    ALKPHOSPHAT 106 (H) 08/25/2020 01:02 PM    ASTSGOT 36 08/25/2020 01:02 PM    ALTSGPT 33 08/25/2020 01:02 PM    TBILIRUBIN 0.8 08/25/2020 01:02 PM      ECHO  7/15/2019  Normal left ventricular systolic function.  Left ventricular ejection fraction is visually estimated to be 65%.  Unable to estimate pulmonary artery pressure due to an inadequate   tricuspid regurgitant jet.  No significant valve abnormalities.        Coronary angiogram   7/2019  There is " no gradient across aortic valve.  Left ventricular end-diastolic pressure was 21 mmHg.     Left ventriculography showed normal wall motion.  Overall LV systolic function is normal .  Ejection fraction is calculated to be 65%.     No significant coronary artery disease   This is right dominant system.     Left main is large and without flow limiting disease.  It bifurcated into left anterior descending and left circumflex artery.    Left anterior descending artery is large caliber vessel and extends to the apex.  It gives rises to one large diagonal branch in the mid segment.  There is no signficant disease in the left anterior descending artery or its major branches.  The antegrade flow is normal.     Left circumflex artery is large in caliber.   It gives rise to one large obtuse marginal branch in the mid segment and several medium sized obtuse marginal branches distally.  There is no significant disease in the LCX system.  The antegrade flow is normal.     Right coronary artery (RCA) is large caliber. It gives rise to several small acute marginal branches, posterior descending artery and posterolateral branch.  There is 30% mid right coronary stenosis but no flow-limiting disease seen in the right coronary artery or its major branches.  The antegrade flow is normal.    Assessment:     1. Cough  DX-CHEST-2 VIEWS   2. Essential hypertension, benign     3. Nonobstructive atherosclerosis of coronary artery         Medical Decision Making:  Today's Assessment / Status / Plan:     1. Cough:  - hard to differentiate from side effect COVID vs ace induced cough  - CXR ordered, if normal most likely ace induced cough. Will change ace to arb     2 hypertension:  - stable   - continue verapamil 240mg qd and lisinopril 5mg qd     3. Nonobstructive CAD:  - denies any angina   - continue atorvastatin 40mg qd     Follow up pending CXR result

## 2021-01-20 NOTE — LETTER
[facility]   [address], [place] [address]  [place] [address]  Phone: [phone]  Fax: [phone]              Haley Hawthorne  1955    Encounter Date: 1/20/2021    DIANNA Liu          PROGRESS NOTE:  Chief Complaint   Patient presents with   • Chest Pain   • Hypertension   • Palpitations       Subjective:   Haley Hawthorne is a 65 y.o. female who presents today for hypertension.    She is followed by Dr. Lemus in our clinic, history of hypertension, hyperlipidemia, atypical chest pain angiogram showed no No significant coronary artery disease 7/2019, and breast cancer. Had COVID 12/22    Last OV with Dr. Lemus 12/2020, patient had elevated bp, added lisinopril.     Overall, patient blood pressure is stable. After COVID she developed a cough. Dry and at night productive cough.     Denies any chest pain, sob, dizziness or palpitations.     Past Medical History:   Diagnosis Date   • Arrhythmia     sinus bradycardia   • Arthritis     hands/ hip-rheumatoid   • Asthma    • Back pain    • Breast cancer (HCC)    • Bronchitis    • CAD (coronary artery disease)    • Cancer (HCC) 2015    dcis/ right breast, radiation   • Chickenpox    • GERD (gastroesophageal reflux disease)    • Heart burn    • High cholesterol    • Hypertension    • Indigestion    • Nasal drainage    • Osteoporosis    • Pain 06/2018    hands   • Pneumonia 2016   • Snoring    • Tonsillitis      Past Surgical History:   Procedure Laterality Date   • PB REPAIR OF NASAL SEPTUM N/A 5/28/2020    Procedure: SEPTOPLASTY, NOSE;  Surgeon: Arnulfo Salgado M.D.;  Location: SURGERY SAME DAY [facility] ORS;  Service: Ent   • TURBINOPLASTY Bilateral 5/28/2020    Procedure: TURBINOPLASTY- SUBMUCOSAL APPROACH;  Surgeon: Arnulfo Salgado M.D.;  Location: SURGERY SAME DAY [facility];  Service: Ent   • PB LAP,APPENDECTOMY  8/8/2019    Procedure: APPENDECTOMY, LAPAROSCOPIC;  Surgeon:   Mich Cunningham M.D.;  Location: [facility];  Service: General   • GANGLION EXCISION Left 9/25/2018    Procedure: GANGLION EXCISION-  CYST;  Surgeon: Hieu Salamanca M.D.;  Location: SURGERY [facility];  Service: Orthopedics   • FINGER ARTHROPLASTY Left 9/25/2018    Procedure: FINGER ARTHROPLASTY- CARPOMETACARPAL;  Surgeon: Hieu Salamanca M.D.;  Location: SURGERY [facility];  Service: Orthopedics   • FLEXOR TENDON REPAIR Left 9/25/2018    Procedure: FLEXOR TENDON REPAIR-  CARPI RADIALIS;  Surgeon: Hieu Salamanca M.D.;  Location: [facility];  Service: Orthopedics   • FINGER ARTHROPLASTY Right 6/5/2018    Procedure: FINGER ARTHROPLASTY - CARPOMETACARPAL;  Surgeon: Hieu Salamanca M.D.;  Location: [facility];  Service: Orthopedics   • TENDON TRANSFER  6/5/2018    Procedure: TENDON TRANSFER - FLEXOR CARPI RADIALIS;  Surgeon: Hieu Salamanca M.D.;  Location: [facility];  Service: Orthopedics   • PARTIAL MASTECTOMY  4/21/2015    Performed by Anna Licona M.D. at SURGERY SAME DAY [facility]   • LUMPECTOMY  4/21/2015    Performed by Anna Licona M.D. at SURGERY SAME DAY [facility]   • CARPAL TUNNEL RELEASE  2013   • HYSTERECTOMY LAPAROSCOPY  2006   • BREAST BIOPSY  unknown    left benign biopsy   • TONSILLECTOMY       Family History   Problem Relation Age of Onset   • Stroke Mother    • Cancer Sister    • Cancer Brother    • Sleep Apnea Son      Social History     Socioeconomic History   • Marital status:      Spouse name: Not on file   • Number of children: Not on file   • Years of education: Not on file   • Highest education level: Not on file   Occupational History   • Not on file   Social Needs   • Financial resource strain: Not on file   • Food insecurity     Worry: Not on file     Inability: Not on file   • Transportation needs     Medical: Not on file     Non-medical: Not on file   Tobacco Use   • Smoking status: Former Smoker      Packs/day: 1.00     Years: 10.00     Pack years: 10.00     Types: Cigarettes     Quit date: 1979     Years since quittin.0   • Smokeless tobacco: Never Used   Substance and Sexual Activity   • Alcohol use: Yes     Frequency: 4 or more times a week     Drinks per session: 1 or 2   • Drug use: No   • Sexual activity: Not on file   Lifestyle   • Physical activity     Days per week: Not on file     Minutes per session: Not on file   • Stress: Not on file   Relationships   • Social connections     Talks on phone: Not on file     Gets together: Not on file     Attends [employer] service: Not on file     Active member of club or organization: Not on file     Attends meetings of clubs or organizations: Not on file     Relationship status: Not on file   • Intimate partner violence     Fear of current or ex partner: Not on file     Emotionally abused: Not on file     Physically abused: Not on file     Forced sexual activity: Not on file   Other Topics Concern   • Not on file   Social History Narrative   • Not on file     Allergies   Allergen Reactions   • Sulfa Drugs Hives     Outpatient Encounter Medications as of 2021   Medication Sig Dispense Refill   • verapamil ER (CALAN-SR) 240 MG Tab CR Take 1 Tab by mouth every day. 90 Tab 3   • atorvastatin (LIPITOR) 40 MG Tab Take 1 Tab by mouth every day. 90 Tab 3   • lisinopril (PRINIVIL) 5 MG Tab Take 1 Tab by mouth every day. 30 Tab 6   • diphenhydrAMINE (BENADRYL) 25 MG Tab Take 25 mg by mouth at bedtime as needed for Sleep.     • fluticasone (FLOVENT HFA) 44 MCG/ACT Aerosol Inhale 2 Puffs by mouth 2 times a day as needed.     • PARoxetine (PAXIL) 10 MG Tab Take 10 mg by mouth every evening.     • omeprazole (PRILOSEC) 20 MG CPDR Take 20 mg by mouth every evening.       No facility-administered encounter medications on file as of 2021.      Review of Systems   Constitutional: Negative for malaise/fatigue.   Respiratory: Positive for cough and sputum  "production. Negative for hemoptysis, shortness of breath and wheezing.    Cardiovascular: Negative for chest pain, palpitations, orthopnea, claudication, leg swelling and PND.   Gastrointestinal: Negative for nausea and vomiting.   Neurological: Negative for dizziness and weakness.        Objective:   /74 (BP Location: Left arm, Patient Position: Sitting, BP Cuff Size: Adult)   Pulse 67   Resp 16   Ht 1.575 m (5' 2\")   Wt 62 kg (136 lb 9.6 oz)   SpO2 98%   BMI 24.98 kg/m²     Physical Exam   Constitutional: She is oriented to person, place, and time. She appears well-developed and well-nourished. No distress.   HENT:   Head: Normocephalic and atraumatic.   Eyes: Pupils are equal, round, and reactive to light.   Neck: No JVD present.   Cardiovascular: Normal rate, regular rhythm and normal heart sounds.   No murmur heard.  Pulmonary/Chest: Effort normal and breath sounds normal.   Abdominal: Soft. Bowel sounds are normal. She exhibits no distension.   Musculoskeletal:         General: No edema.   Neurological: She is alert and oriented to person, place, and time.   Skin: Skin is warm and dry. She is not diaphoretic.   Psychiatric: She has a normal mood and affect. Her behavior is normal. Judgment and thought content normal.         Lab Results   Component Value Date/Time    CHOLSTRLTOT 147 01/07/2021 01:04 PM    LDL 84 01/07/2021 01:04 PM    HDL 43 01/07/2021 01:04 PM    TRIGLYCERIDE 101 01/07/2021 01:04 PM       Lab Results   Component Value Date/Time    SODIUM 140 08/25/2020 01:02 PM    POTASSIUM 4.2 08/25/2020 01:02 PM    CHLORIDE 102 08/25/2020 01:02 PM    CO2 22 08/25/2020 01:02 PM    GLUCOSE 72 08/25/2020 01:02 PM    BUN 20 08/25/2020 01:02 PM    CREATININE 0.86 08/25/2020 01:02 PM     Lab Results   Component Value Date/Time    ALKPHOSPHAT 106 (H) 08/25/2020 01:02 PM    ASTSGOT 36 08/25/2020 01:02 PM    ALTSGPT 33 08/25/2020 01:02 PM    TBILIRUBIN 0.8 08/25/2020 01:02 PM      ECHO  7/15/2019  Normal " left ventricular systolic function.  Left ventricular ejection fraction is visually estimated to be 65%.  Unable to estimate pulmonary artery pressure due to an inadequate   tricuspid regurgitant jet.  No significant valve abnormalities.        Coronary angiogram   7/2019  There is no gradient across aortic valve.  Left ventricular end-diastolic pressure was 21 mmHg.     Left ventriculography showed normal wall motion.  Overall LV systolic function is normal .  Ejection fraction is calculated to be 65%.     No significant coronary artery disease   This is right dominant system.     Left main is large and without flow limiting disease.  It bifurcated into left anterior descending and left circumflex artery.    Left anterior descending artery is large caliber vessel and extends to the apex.  It gives rises to one large diagonal branch in the mid segment.  There is no signficant disease in the left anterior descending artery or its major branches.  The antegrade flow is normal.     Left circumflex artery is large in caliber.   It gives rise to one large obtuse marginal branch in the mid segment and several medium sized obtuse marginal branches distally.  There is no significant disease in the LCX system.  The antegrade flow is normal.     Right coronary artery (RCA) is large caliber. It gives rise to several small acute marginal branches, posterior descending artery and posterolateral branch.  There is 30% mid right coronary stenosis but no flow-limiting disease seen in the right coronary artery or its major branches.  The antegrade flow is normal.    Assessment:     1. Cough  DX-CHEST-2 VIEWS   2. Essential hypertension, benign     3. Nonobstructive atherosclerosis of coronary artery         Medical Decision Making:  Today's Assessment / Status / Plan:     1. Cough:  - hard to differentiate from side effect COVID vs ace induced cough  - CXR ordered, if normal most likely ace induced cough. Will change ace to arb     2  hypertension:  - stable   - continue verapamil 240mg qd and lisinopril 5mg qd     3. Nonobstructive CAD:  - denies any angina   - continue atorvastatin 40mg qd     Follow up pending CXR result           Rose Sanchez M.D.  [id number]  [place] [address]  Via Fax: [phone]

## 2021-01-21 ENCOUNTER — TELEPHONE (OUTPATIENT)
Dept: CARDIOLOGY | Facility: MEDICAL CENTER | Age: 66
End: 2021-01-21

## 2021-01-21 RX ORDER — LOSARTAN POTASSIUM 25 MG/1
25 TABLET ORAL DAILY
Qty: 90 TAB | Refills: 3 | Status: SHIPPED | OUTPATIENT
Start: 2021-01-21 | End: 2022-01-21 | Stop reason: SDUPTHER

## 2021-01-21 NOTE — TELEPHONE ENCOUNTER
LVM with patient. Also sent [employer] message.    DC'd lisinopril and sent new prescription for losartan 25mg/daily to [facility].

## 2021-01-21 NOTE — TELEPHONE ENCOUNTER
----- Message from Leilani Arreola R.N. sent at 1/20/2021  3:54 PM PST -----  To Michelle Sosa APRN: MONISHA 2/22/21

## 2021-01-21 NOTE — TELEPHONE ENCOUNTER
cxr showed no pneumonia. Most likely the cough is due to lisinopril. Stop lisinopril and start losartan 25mg once a day.    Thank you,  Redet

## 2021-02-08 ENCOUNTER — HOSPITAL ENCOUNTER (OUTPATIENT)
Dept: RADIOLOGY | Facility: MEDICAL CENTER | Age: 66
End: 2021-02-08
Attending: FAMILY MEDICINE
Payer: MEDICARE

## 2021-02-08 DIAGNOSIS — Z12.31 VISIT FOR SCREENING MAMMOGRAM: ICD-10-CM

## 2021-02-08 PROCEDURE — 77063 BREAST TOMOSYNTHESIS BI: CPT

## 2021-02-22 ENCOUNTER — OFFICE VISIT (OUTPATIENT)
Dept: CARDIOLOGY | Facility: MEDICAL CENTER | Age: 66
End: 2021-02-22
Payer: MEDICARE

## 2021-02-22 VITALS
WEIGHT: 135.7 LBS | RESPIRATION RATE: 12 BRPM | DIASTOLIC BLOOD PRESSURE: 84 MMHG | SYSTOLIC BLOOD PRESSURE: 118 MMHG | HEART RATE: 65 BPM | HEIGHT: 62 IN | OXYGEN SATURATION: 97 % | BODY MASS INDEX: 24.97 KG/M2

## 2021-02-22 DIAGNOSIS — I10 ESSENTIAL HYPERTENSION, BENIGN: ICD-10-CM

## 2021-02-22 DIAGNOSIS — R00.2 PALPITATIONS: ICD-10-CM

## 2021-02-22 DIAGNOSIS — I25.10 NONOBSTRUCTIVE ATHEROSCLEROSIS OF CORONARY ARTERY: ICD-10-CM

## 2021-02-22 PROCEDURE — 99214 OFFICE O/P EST MOD 30 MIN: CPT | Performed by: NURSE PRACTITIONER

## 2021-02-22 RX ORDER — IBUPROFEN 600 MG/1
600 TABLET ORAL EVERY 6 HOURS PRN
COMMUNITY
Start: 2021-02-11 | End: 2023-10-24

## 2021-02-22 RX ORDER — HYDROCODONE BITARTRATE AND ACETAMINOPHEN 5; 325 MG/1; MG/1
TABLET ORAL
COMMUNITY
Start: 2021-02-11 | End: 2021-02-22

## 2021-02-22 ASSESSMENT — ENCOUNTER SYMPTOMS
ORTHOPNEA: 0
HEMOPTYSIS: 0
PALPITATIONS: 1
WEAKNESS: 0
WHEEZING: 0
SHORTNESS OF BREATH: 0
DIZZINESS: 0
COUGH: 0
PND: 0
SPUTUM PRODUCTION: 0
VOMITING: 0
CLAUDICATION: 0
NAUSEA: 0

## 2021-02-22 ASSESSMENT — FIBROSIS 4 INDEX: FIB4 SCORE: 2.49

## 2021-02-22 NOTE — PROGRESS NOTES
Chief Complaint   Patient presents with   • Chest Pain     F/V Dx: Other chest pain   • Hypertension     F/V Dx: Essential hypertension, benign   • Palpitations     F/V        Subjective:   Haley Hawthorne is a 65 y.o. female who presents today for hypertension.    She is followed by Dr. Lemus in our clinic, history of hypertension, hyperlipidemia, atypical chest pain angiogram showed no No significant coronary artery disease 7/2019, and breast cancer. Had COVID 12/22    Last OV with Dr. Lemus 12/2020, patient had elevated bp, added lisinopril. Then had cough, we switched her to losartan and tolerating well.     Her cough resolved switched to losartan from lisinopril. Today, she woke up and doing chores around the house got very lightheaded and had palpitations. Her blood pressure was 107/70s. Resolved with rest and mediatation. She stated she used to get these sensation like flip-flopping sensation in her chest years ago.       Past Medical History:   Diagnosis Date   • Arrhythmia     sinus bradycardia   • Arthritis     hands/ hip-rheumatoid   • Asthma    • Back pain    • Breast cancer (HCC)    • Bronchitis    • CAD (coronary artery disease)    • Cancer (HCC) 2015    dcis/ right breast, radiation   • Chickenpox    • GERD (gastroesophageal reflux disease)    • Heart burn    • High cholesterol    • Hypertension    • Indigestion    • Nasal drainage    • Osteoporosis    • Pain 06/2018    hands   • Pneumonia 2016   • Snoring    • Tonsillitis      Past Surgical History:   Procedure Laterality Date   • PB REPAIR OF NASAL SEPTUM N/A 5/28/2020    Procedure: SEPTOPLASTY, NOSE;  Surgeon: Arnulfo Salgado M.D.;  Location: SURGERY SAME DAY [facility];  Service: Ent   • TURBINOPLASTY Bilateral 5/28/2020    Procedure: TURBINOPLASTY- SUBMUCOSAL APPROACH;  Surgeon: Arnulfo Salgado M.D.;  Location: SURGERY SAME DAY [facility];  Service: Ent   • PB LAP,APPENDECTOMY  8/8/2019    Procedure: APPENDECTOMY, LAPAROSCOPIC;   Surgeon: John Cunningham M.D.;  Location: [facility];  Service: General   • GANGLION EXCISION Left 2018    Procedure: GANGLION EXCISION-  CYST;  Surgeon: Hieu Salamanca M.D.;  Location: SURGERY [facility];  Service: Orthopedics   • FINGER ARTHROPLASTY Left 2018    Procedure: FINGER ARTHROPLASTY- CARPOMETACARPAL;  Surgeon: Hieu Salamanca M.D.;  Location: SURGERY [facility];  Service: Orthopedics   • FLEXOR TENDON REPAIR Left 2018    Procedure: FLEXOR TENDON REPAIR-  CARPI RADIALIS;  Surgeon: Hieu Salamanca M.D.;  Location: [facility];  Service: Orthopedics   • FINGER ARTHROPLASTY Right 2018    Procedure: FINGER ARTHROPLASTY - CARPOMETACARPAL;  Surgeon: Hieu Salamanca M.D.;  Location: [facility];  Service: Orthopedics   • TENDON TRANSFER  2018    Procedure: TENDON TRANSFER - FLEXOR CARPI RADIALIS;  Surgeon: Hieu Salamanca M.D.;  Location: [facility];  Service: Orthopedics   • PARTIAL MASTECTOMY  2015    Performed by Anna Licona M.D. at SURGERY SAME DAY [facility]   • LUMPECTOMY  2015    Performed by Anna Licona M.D. at SURGERY SAME DAY [facility]   • CARPAL TUNNEL RELEASE     • HYSTERECTOMY LAPAROSCOPY     • BREAST BIOPSY  unknown    left benign biopsy   • TONSILLECTOMY       Family History   Problem Relation Age of Onset   • Stroke Mother    • Cancer Sister    • Cancer Brother    • Sleep Apnea Son      Social History     Socioeconomic History   • Marital status:      Spouse name: Not on file   • Number of children: Not on file   • Years of education: Not on file   • Highest education level: Not on file   Occupational History   • Not on file   Tobacco Use   • Smoking status: Former Smoker     Packs/day: 1.00     Years: 10.00     Pack years: 10.00     Types: Cigarettes     Quit date: 1979     Years since quittin.1   • Smokeless tobacco: Never Used   Substance and Sexual  Activity   • Alcohol use: Yes   • Drug use: No   • Sexual activity: Not on file   Other Topics Concern   • Not on file   Social History Narrative   • Not on file     Social Determinants of Health     Financial Resource Strain:    • Difficulty of Paying Living Expenses:    Food Insecurity:    • Worried About Running Out of Food in the Last Year:    • Ran Out of Food in the Last Year:    Transportation Needs:    • Lack of Transportation (Medical):    • Lack of Transportation (Non-Medical):    Physical Activity:    • Days of Exercise per Week:    • Minutes of Exercise per Session:    Stress:    • Feeling of Stress :    Social Connections:    • Frequency of Communication with Friends and Family:    • Frequency of Social Gatherings with Friends and Family:    • Attends Holiness Services:    • Active Member of Clubs or Organizations:    • Attends Club or Organization Meetings:    • Marital Status:    Intimate Partner Violence:    • Fear of Current or Ex-Partner:    • Emotionally Abused:    • Physically Abused:    • Sexually Abused:      Allergies   Allergen Reactions   • Sulfa Drugs Hives     Outpatient Encounter Medications as of 2/22/2021   Medication Sig Dispense Refill   • ibuprofen (MOTRIN) 600 MG Tab      • losartan (COZAAR) 25 MG Tab Take 1 Tab by mouth every day. 90 Tab 3   • verapamil ER (CALAN-SR) 240 MG Tab CR Take 1 Tab by mouth every day. 90 Tab 3   • atorvastatin (LIPITOR) 40 MG Tab Take 1 Tab by mouth every day. 90 Tab 3   • diphenhydrAMINE (BENADRYL) 25 MG Tab Take 25 mg by mouth at bedtime as needed for Sleep.     • fluticasone (FLOVENT HFA) 44 MCG/ACT Aerosol Inhale 2 Puffs by mouth 2 times a day as needed.     • PARoxetine (PAXIL) 10 MG Tab Take 10 mg by mouth every evening.     • omeprazole (PRILOSEC) 20 MG CPDR Take 20 mg by mouth every evening.     • [DISCONTINUED] HYDROcodone-acetaminophen (NORCO) 5-325 MG Tab per tablet        No facility-administered encounter medications on file as of 2/22/2021.  "    Review of Systems   Constitutional: Negative for malaise/fatigue.   Respiratory: Negative for cough, hemoptysis, sputum production, shortness of breath and wheezing.    Cardiovascular: Positive for palpitations. Negative for chest pain, orthopnea, claudication, leg swelling and PND.   Gastrointestinal: Negative for nausea and vomiting.   Neurological: Negative for dizziness and weakness.        Objective:   /84 (BP Location: Left arm, Patient Position: Sitting, BP Cuff Size: Adult)   Pulse 65   Resp 12   Ht 1.575 m (5' 2\")   Wt 61.6 kg (135 lb 11.2 oz)   SpO2 97%   BMI 24.82 kg/m²     Physical Exam   Constitutional: She is oriented to person, place, and time. She appears well-developed and well-nourished. No distress.   HENT:   Head: Normocephalic and atraumatic.   Eyes: Pupils are equal, round, and reactive to light.   Neck: No JVD present.   Cardiovascular: Normal rate, regular rhythm and normal heart sounds.   No murmur heard.  Pulmonary/Chest: Effort normal and breath sounds normal.   Abdominal: Soft. Bowel sounds are normal. She exhibits no distension.   Musculoskeletal:         General: No edema.   Neurological: She is alert and oriented to person, place, and time.   Skin: Skin is warm and dry. She is not diaphoretic.   Psychiatric: She has a normal mood and affect. Her behavior is normal. Judgment and thought content normal.         Lab Results   Component Value Date/Time    CHOLSTRLTOT 147 01/07/2021 01:04 PM    LDL 84 01/07/2021 01:04 PM    HDL 43 01/07/2021 01:04 PM    TRIGLYCERIDE 101 01/07/2021 01:04 PM       Lab Results   Component Value Date/Time    SODIUM 140 08/25/2020 01:02 PM    POTASSIUM 4.2 08/25/2020 01:02 PM    CHLORIDE 102 08/25/2020 01:02 PM    CO2 22 08/25/2020 01:02 PM    GLUCOSE 72 08/25/2020 01:02 PM    BUN 20 08/25/2020 01:02 PM    CREATININE 0.86 08/25/2020 01:02 PM     Lab Results   Component Value Date/Time    ALKPHOSPHAT 106 (H) 08/25/2020 01:02 PM    ASTSGOT 36 " 08/25/2020 01:02 PM    ALTSGPT 33 08/25/2020 01:02 PM    TBILIRUBIN 0.8 08/25/2020 01:02 PM      ECHO  7/15/2019  Normal left ventricular systolic function.  Left ventricular ejection fraction is visually estimated to be 65%.  Unable to estimate pulmonary artery pressure due to an inadequate   tricuspid regurgitant jet.  No significant valve abnormalities.        Coronary angiogram   7/2019  There is no gradient across aortic valve.  Left ventricular end-diastolic pressure was 21 mmHg.     Left ventriculography showed normal wall motion.  Overall LV systolic function is normal .  Ejection fraction is calculated to be 65%.     No significant coronary artery disease   This is right dominant system.     Left main is large and without flow limiting disease.  It bifurcated into left anterior descending and left circumflex artery.    Left anterior descending artery is large caliber vessel and extends to the apex.  It gives rises to one large diagonal branch in the mid segment.  There is no signficant disease in the left anterior descending artery or its major branches.  The antegrade flow is normal.     Left circumflex artery is large in caliber.   It gives rise to one large obtuse marginal branch in the mid segment and several medium sized obtuse marginal branches distally.  There is no significant disease in the LCX system.  The antegrade flow is normal.     Right coronary artery (RCA) is large caliber. It gives rise to several small acute marginal branches, posterior descending artery and posterolateral branch.  There is 30% mid right coronary stenosis but no flow-limiting disease seen in the right coronary artery or its major branches.  The antegrade flow is normal.    Assessment:     1. Essential hypertension, benign     2. Nonobstructive atherosclerosis of coronary artery     3. Palpitations         Medical Decision Making:  Today's Assessment / Status / Plan:     1.  hypertension:  - stable   - continue verapamil  240mg qd and losartan 25mg qd    2. Nonobstructive CAD:  - denies any angina   - continue atorvastatin 40mg qd     3. Palpitations:  - once incident for years now   - if it reoccurs we will get event monitor   - continue verapamil 240mg qd     Follow up in 6 months, sooner as needed.

## 2021-03-03 DIAGNOSIS — Z23 NEED FOR VACCINATION: ICD-10-CM

## 2021-03-11 ENCOUNTER — IMMUNIZATION (OUTPATIENT)
Dept: FAMILY PLANNING/WOMEN'S HEALTH CLINIC | Facility: IMMUNIZATION CENTER | Age: 66
End: 2021-03-11
Attending: INTERNAL MEDICINE
Payer: MEDICARE

## 2021-03-11 ENCOUNTER — HOSPITAL ENCOUNTER (OUTPATIENT)
Dept: LAB | Facility: MEDICAL CENTER | Age: 66
End: 2021-03-11
Attending: FAMILY MEDICINE
Payer: MEDICARE

## 2021-03-11 DIAGNOSIS — Z23 NEED FOR VACCINATION: ICD-10-CM

## 2021-03-11 DIAGNOSIS — Z23 ENCOUNTER FOR VACCINATION: Primary | ICD-10-CM

## 2021-03-11 LAB
ERYTHROCYTE [DISTWIDTH] IN BLOOD BY AUTOMATED COUNT: 46 FL (ref 35.9–50)
HCT VFR BLD AUTO: 39.2 % (ref 37–47)
HGB BLD-MCNC: 12.9 G/DL (ref 12–16)
LDH SERPL L TO P-CCNC: 182 U/L (ref 107–266)
MCH RBC QN AUTO: 31.5 PG (ref 27–33)
MCHC RBC AUTO-ENTMCNC: 32.9 G/DL (ref 33.6–35)
MCV RBC AUTO: 95.8 FL (ref 81.4–97.8)
PLATELET # BLD AUTO: 164 K/UL (ref 164–446)
PMV BLD AUTO: 11.1 FL (ref 9–12.9)
RBC # BLD AUTO: 4.09 M/UL (ref 4.2–5.4)
WBC # BLD AUTO: 4 K/UL (ref 4.8–10.8)

## 2021-03-11 PROCEDURE — 85007 BL SMEAR W/DIFF WBC COUNT: CPT

## 2021-03-11 PROCEDURE — 83615 LACTATE (LD) (LDH) ENZYME: CPT

## 2021-03-11 PROCEDURE — 85027 COMPLETE CBC AUTOMATED: CPT

## 2021-03-11 PROCEDURE — 91300 PFIZER SARS-COV-2 VACCINE: CPT

## 2021-03-11 PROCEDURE — 36415 COLL VENOUS BLD VENIPUNCTURE: CPT

## 2021-03-11 PROCEDURE — 0001A PFIZER SARS-COV-2 VACCINE: CPT

## 2021-03-12 LAB
BASOPHILS # BLD AUTO: 0 % (ref 0–1.8)
BASOPHILS # BLD: 0 K/UL (ref 0–0.12)
EOSINOPHIL # BLD AUTO: 0.07 K/UL (ref 0–0.51)
EOSINOPHIL NFR BLD: 1.7 % (ref 0–6.9)
LYMPHOCYTES # BLD AUTO: 0.84 K/UL (ref 1–4.8)
LYMPHOCYTES NFR BLD: 20.9 % (ref 22–41)
MANUAL DIFF BLD: ABNORMAL
MONOCYTES # BLD AUTO: 0.21 K/UL (ref 0–0.85)
MONOCYTES NFR BLD AUTO: 5.2 % (ref 0–13.4)
NEUTROPHILS # BLD AUTO: 2.89 K/UL (ref 2–7.15)
NEUTROPHILS NFR BLD: 72.2 % (ref 44–72)

## 2021-03-29 ENCOUNTER — PATIENT MESSAGE (OUTPATIENT)
Dept: CARDIOLOGY | Facility: MEDICAL CENTER | Age: 66
End: 2021-03-29

## 2021-03-29 DIAGNOSIS — R00.2 PALPITATIONS: ICD-10-CM

## 2021-03-29 NOTE — PATIENT COMMUNICATION
"Per Michelle's 2-22-21 OV dictation:   \" Palpitations:  - once incident for years now   - if it reoccurs we will get event monitor \"  To Michelle- okay to order monitor?  "

## 2021-03-29 NOTE — TELEPHONE ENCOUNTER
From: Haley Hawthorne  To: Nurse Practicioner Michelle Sosa  Sent: 3/29/2021 9:07 AM PDT  Subject: Non-Urgent Medical Question    Redet,  Yesterday I had an irregular heart beat that lasted about 10 seconds. Afterwards I was very very tired. Should I come in to see you? I've irregular heart beats before but not like this one. It lasted awhile??  Haley Watson

## 2021-04-02 ENCOUNTER — IMMUNIZATION (OUTPATIENT)
Dept: FAMILY PLANNING/WOMEN'S HEALTH CLINIC | Facility: IMMUNIZATION CENTER | Age: 66
End: 2021-04-02
Attending: INTERNAL MEDICINE
Payer: MEDICARE

## 2021-04-02 DIAGNOSIS — Z23 ENCOUNTER FOR VACCINATION: Primary | ICD-10-CM

## 2021-04-02 PROCEDURE — 0002A PFIZER SARS-COV-2 VACCINE: CPT

## 2021-04-02 PROCEDURE — 91300 PFIZER SARS-COV-2 VACCINE: CPT

## 2021-05-24 ENCOUNTER — HOSPITAL ENCOUNTER (OUTPATIENT)
Dept: RADIOLOGY | Facility: MEDICAL CENTER | Age: 66
End: 2021-05-24
Attending: PSYCHIATRY & NEUROLOGY
Payer: MEDICARE

## 2021-05-24 DIAGNOSIS — R41.3 MEMORY LOSS: ICD-10-CM

## 2021-05-24 DIAGNOSIS — R25.1 TREMOR: ICD-10-CM

## 2021-05-24 PROCEDURE — 70551 MRI BRAIN STEM W/O DYE: CPT | Mod: MH

## 2021-08-18 ENCOUNTER — APPOINTMENT (OUTPATIENT)
Dept: RADIOLOGY | Facility: MEDICAL CENTER | Age: 66
End: 2021-08-18
Attending: FAMILY MEDICINE
Payer: MEDICARE

## 2021-09-02 ENCOUNTER — APPOINTMENT (OUTPATIENT)
Dept: RADIOLOGY | Facility: MEDICAL CENTER | Age: 66
End: 2021-09-02
Attending: FAMILY MEDICINE
Payer: MEDICARE

## 2021-09-08 ENCOUNTER — HOSPITAL ENCOUNTER (OUTPATIENT)
Dept: RADIOLOGY | Facility: MEDICAL CENTER | Age: 66
End: 2021-09-08
Attending: FAMILY MEDICINE
Payer: MEDICARE

## 2021-09-08 DIAGNOSIS — N95.8 POSTARTIFICIAL MENOPAUSAL SYNDROME: ICD-10-CM

## 2021-09-08 PROCEDURE — 77080 DXA BONE DENSITY AXIAL: CPT

## 2021-09-13 ENCOUNTER — HOSPITAL ENCOUNTER (OUTPATIENT)
Dept: RADIOLOGY | Facility: MEDICAL CENTER | Age: 66
End: 2021-09-13
Attending: FAMILY MEDICINE
Payer: MEDICARE

## 2021-09-13 DIAGNOSIS — R10.9 FLANK PAIN: ICD-10-CM

## 2021-09-13 PROCEDURE — 76775 US EXAM ABDO BACK WALL LIM: CPT

## 2021-09-20 ASSESSMENT — ENCOUNTER SYMPTOMS
COUGH: 0
PALPITATIONS: 1
SHORTNESS OF BREATH: 0
HEMOPTYSIS: 0
VOMITING: 0
PND: 0
WHEEZING: 0
WEAKNESS: 0
SPUTUM PRODUCTION: 0
ORTHOPNEA: 0
CLAUDICATION: 0
DIZZINESS: 0
NAUSEA: 0

## 2021-09-20 NOTE — PROGRESS NOTES
Chief Complaint   Patient presents with   • Abnormal Cardiac Function Testing     F/V Dx: Abnormal stress test   • Aortic Atherosclerosis     F/V Dx: Nonobstructive atherosclerosis of coronary artery   • Hypertension       Subjective:   Haley Hawthorne is a 65 y.o. female who presents today for hypertension.    History of hypertension, hyperlipidemia, atypical chest pain angiogram showed no No significant coronary artery disease 7/2019, and breast cancer. Had COVID 12/22    Last OV 2/2021: noted palpitations, she will like to monitor.     OV Dr. Lemus 12/2020, patient had elevated bp, added lisinopril. Then had cough, we switched her to losartan and tolerating well.     She has been abstaining from alcohol since last drink Saturday night. She usually consumes a bottle of wine a night. Yesterday, she had pounding sensation in the chest lasting couple minutes. Noted some tremors.     Past Medical History:   Diagnosis Date   • Arrhythmia     sinus bradycardia   • Arthritis     hands/ hip-rheumatoid   • Asthma    • Back pain    • Breast cancer (HCC)    • Bronchitis    • CAD (coronary artery disease)    • Cancer (HCC) 2015    dcis/ right breast, radiation   • Chickenpox    • GERD (gastroesophageal reflux disease)    • Heart burn    • High cholesterol    • Hypertension    • Indigestion    • Nasal drainage    • Osteoporosis    • Pain 06/2018    hands   • Pneumonia 2016   • Snoring    • Tonsillitis      Past Surgical History:   Procedure Laterality Date   • PB REPAIR OF NASAL SEPTUM N/A 5/28/2020    Procedure: SEPTOPLASTY, NOSE;  Surgeon: Arnulfo Salgado M.D.;  Location: SURGERY SAME DAY [facility];  Service: Ent   • TURBINOPLASTY Bilateral 5/28/2020    Procedure: TURBINOPLASTY- SUBMUCOSAL APPROACH;  Surgeon: Arnulfo Salgado M.D.;  Location: SURGERY SAME DAY [facility];  Service: Ent   • PB LAP,APPENDECTOMY  8/8/2019    Procedure: APPENDECTOMY, LAPAROSCOPIC;  Surgeon: John Cunningham M.D.;  Location:  SURGERY [facility];  Service: General   • GANGLION EXCISION Left 2018    Procedure: GANGLION EXCISION-  CYST;  Surgeon: Hieu Salamanca M.D.;  Location: [facility];  Service: Orthopedics   • FINGER ARTHROPLASTY Left 2018    Procedure: FINGER ARTHROPLASTY- CARPOMETACARPAL;  Surgeon: Hieu Salamanca M.D.;  Location: [facility];  Service: Orthopedics   • FLEXOR TENDON REPAIR Left 2018    Procedure: FLEXOR TENDON REPAIR-  CARPI RADIALIS;  Surgeon: Hieu Salamanca M.D.;  Location: [facility];  Service: Orthopedics   • FINGER ARTHROPLASTY Right 2018    Procedure: FINGER ARTHROPLASTY - CARPOMETACARPAL;  Surgeon: Hieu Salamanca M.D.;  Location: [facility];  Service: Orthopedics   • TENDON TRANSFER  2018    Procedure: TENDON TRANSFER - FLEXOR CARPI RADIALIS;  Surgeon: Hieu Salamanca M.D.;  Location: [facility];  Service: Orthopedics   • PARTIAL MASTECTOMY  2015    Performed by Anna Licona M.D. at SURGERY SAME DAY [facility]   • LUMPECTOMY  2015    Performed by Anna Licona M.D. at SURGERY SAME DAY [facility]   • CARPAL TUNNEL RELEASE     • HYSTERECTOMY LAPAROSCOPY     • BREAST BIOPSY  unknown    left benign biopsy   • TONSILLECTOMY       Family History   Problem Relation Age of Onset   • Stroke Mother    • Cancer Sister    • Cancer Brother    • Sleep Apnea Son      Social History     Socioeconomic History   • Marital status:      Spouse name: Not on file   • Number of children: Not on file   • Years of education: Not on file   • Highest education level: Not on file   Occupational History   • Not on file   Tobacco Use   • Smoking status: Former Smoker     Packs/day: 1.00     Years: 10.00     Pack years: 10.00     Types: Cigarettes     Quit date: 1979     Years since quittin.7   • Smokeless tobacco: Never Used   Vaping Use   • Vaping Use: Never used   Substance and Sexual Activity   •  Alcohol use: Yes   • Drug use: No   • Sexual activity: Not on file   Other Topics Concern   • Not on file   Social History Narrative   • Not on file     Social Determinants of Health     Financial Resource Strain:    • Difficulty of Paying Living Expenses:    Food Insecurity:    • Worried About Running Out of Food in the Last Year:    • Ran Out of Food in the Last Year:    Transportation Needs:    • Lack of Transportation (Medical):    • Lack of Transportation (Non-Medical):    Physical Activity:    • Days of Exercise per Week:    • Minutes of Exercise per Session:    Stress:    • Feeling of Stress :    Social Connections:    • Frequency of Communication with Friends and Family:    • Frequency of Social Gatherings with Friends and Family:    • Attends Buddhism Services:    • Active Member of Clubs or Organizations:    • Attends Club or Organization Meetings:    • Marital Status:    Intimate Partner Violence:    • Fear of Current or Ex-Partner:    • Emotionally Abused:    • Physically Abused:    • Sexually Abused:      Allergies   Allergen Reactions   • Sulfa Drugs Hives     Outpatient Encounter Medications as of 9/21/2021   Medication Sig Dispense Refill   • fluconazole (DIFLUCAN) 150 MG tablet      • cefdinir (OMNICEF) 300 MG Cap      • MELATONIN ER PO Take  by mouth.     • ibuprofen (MOTRIN) 600 MG Tab      • losartan (COZAAR) 25 MG Tab Take 1 Tab by mouth every day. 90 Tab 3   • verapamil ER (CALAN-SR) 240 MG Tab CR Take 1 Tab by mouth every day. 90 Tab 3   • atorvastatin (LIPITOR) 40 MG Tab Take 1 Tab by mouth every day. 90 Tab 3   • fluticasone (FLOVENT HFA) 44 MCG/ACT Aerosol Inhale 2 Puffs by mouth 2 times a day as needed.     • PARoxetine (PAXIL) 10 MG Tab Take 10 mg by mouth every evening.     • omeprazole (PRILOSEC) 20 MG CPDR Take 20 mg by mouth every evening.     • [DISCONTINUED] diphenhydrAMINE (BENADRYL) 25 MG Tab Take 25 mg by mouth at bedtime as needed for Sleep.       No facility-administered  "encounter medications on file as of 9/21/2021.     Review of Systems   Constitutional: Negative for malaise/fatigue.   Respiratory: Negative for cough, hemoptysis, sputum production, shortness of breath and wheezing.    Cardiovascular: Positive for palpitations. Negative for chest pain, orthopnea, claudication, leg swelling and PND.   Gastrointestinal: Negative for nausea and vomiting.   Neurological: Positive for tremors. Negative for dizziness and weakness.        Objective:   /70 (BP Location: Left arm, Patient Position: Sitting, BP Cuff Size: Adult)   Pulse 60   Resp 18   Ht 1.575 m (5' 2\")   Wt 61 kg (134 lb 8 oz)   SpO2 96%   BMI 24.60 kg/m²     Physical Exam  Vitals and nursing note reviewed.   Constitutional:       General: She is not in acute distress.     Appearance: She is well-developed. She is not diaphoretic.   HENT:      Head: Normocephalic and atraumatic.   Eyes:      Pupils: Pupils are equal, round, and reactive to light.   Neck:      Vascular: No JVD.   Cardiovascular:      Rate and Rhythm: Normal rate and regular rhythm.      Heart sounds: Normal heart sounds. No murmur heard.     Pulmonary:      Effort: Pulmonary effort is normal.      Breath sounds: Normal breath sounds.   Abdominal:      General: Bowel sounds are normal. There is no distension.      Palpations: Abdomen is soft.   Skin:     General: Skin is warm and dry.   Neurological:      Mental Status: She is alert and oriented to person, place, and time.   Psychiatric:         Behavior: Behavior normal.         Thought Content: Thought content normal.         Judgment: Judgment normal.           Lab Results   Component Value Date/Time    CHOLSTRLTOT 147 01/07/2021 01:04 PM    LDL 84 01/07/2021 01:04 PM    HDL 43 01/07/2021 01:04 PM    TRIGLYCERIDE 101 01/07/2021 01:04 PM       Lab Results   Component Value Date/Time    SODIUM 140 08/25/2020 01:02 PM    POTASSIUM 4.2 08/25/2020 01:02 PM    CHLORIDE 102 08/25/2020 01:02 PM    CO2 " 22 08/25/2020 01:02 PM    GLUCOSE 72 08/25/2020 01:02 PM    BUN 20 08/25/2020 01:02 PM    CREATININE 0.86 08/25/2020 01:02 PM     Lab Results   Component Value Date/Time    ALKPHOSPHAT 106 (H) 08/25/2020 01:02 PM    ASTSGOT 36 08/25/2020 01:02 PM    ALTSGPT 33 08/25/2020 01:02 PM    TBILIRUBIN 0.8 08/25/2020 01:02 PM      ECHO  7/15/2019  Normal left ventricular systolic function.  Left ventricular ejection fraction is visually estimated to be 65%.  Unable to estimate pulmonary artery pressure due to an inadequate   tricuspid regurgitant jet.  No significant valve abnormalities.        Coronary angiogram   7/2019  There is no gradient across aortic valve.  Left ventricular end-diastolic pressure was 21 mmHg.     Left ventriculography showed normal wall motion.  Overall LV systolic function is normal .  Ejection fraction is calculated to be 65%.     No significant coronary artery disease   This is right dominant system.     Left main is large and without flow limiting disease.  It bifurcated into left anterior descending and left circumflex artery.    Left anterior descending artery is large caliber vessel and extends to the apex.  It gives rises to one large diagonal branch in the mid segment.  There is no signficant disease in the left anterior descending artery or its major branches.  The antegrade flow is normal.     Left circumflex artery is large in caliber.   It gives rise to one large obtuse marginal branch in the mid segment and several medium sized obtuse marginal branches distally.  There is no significant disease in the LCX system.  The antegrade flow is normal.     Right coronary artery (RCA) is large caliber. It gives rise to several small acute marginal branches, posterior descending artery and posterolateral branch.  There is 30% mid right coronary stenosis but no flow-limiting disease seen in the right coronary artery or its major branches.  The antegrade flow is normal.    Assessment:     1.  Essential hypertension, benign     2. Palpitations         Medical Decision Making:  Today's Assessment / Status / Plan:     1.  hypertension:  - stable   - continue verapamil 240mg qd and losartan 25mg qd    2. Nonobstructive CAD:  - denies any angina   - continue atorvastatin 40mg qd     3. Palpitations:  - patient is currently withdrawing from alcohol   - if it reoccurs after 1 week, we will get event monitor   - continue verapamil 240mg qd     Follow up in 6 months, sooner as needed.

## 2021-09-21 ENCOUNTER — OFFICE VISIT (OUTPATIENT)
Dept: CARDIOLOGY | Facility: MEDICAL CENTER | Age: 66
End: 2021-09-21
Payer: MEDICARE

## 2021-09-21 VITALS
HEART RATE: 60 BPM | OXYGEN SATURATION: 96 % | SYSTOLIC BLOOD PRESSURE: 110 MMHG | WEIGHT: 134.5 LBS | BODY MASS INDEX: 24.75 KG/M2 | DIASTOLIC BLOOD PRESSURE: 70 MMHG | HEIGHT: 62 IN | RESPIRATION RATE: 18 BRPM

## 2021-09-21 DIAGNOSIS — I10 ESSENTIAL HYPERTENSION, BENIGN: ICD-10-CM

## 2021-09-21 DIAGNOSIS — R00.2 PALPITATIONS: ICD-10-CM

## 2021-09-21 PROCEDURE — 99213 OFFICE O/P EST LOW 20 MIN: CPT | Performed by: NURSE PRACTITIONER

## 2021-09-21 RX ORDER — FLUCONAZOLE 150 MG/1
TABLET ORAL
COMMUNITY
Start: 2021-08-03 | End: 2022-01-10

## 2021-09-21 RX ORDER — CEFDINIR 300 MG/1
CAPSULE ORAL
COMMUNITY
Start: 2021-06-28 | End: 2022-01-10

## 2021-09-21 ASSESSMENT — ENCOUNTER SYMPTOMS: TREMORS: 1

## 2021-09-21 ASSESSMENT — FIBROSIS 4 INDEX: FIB4 SCORE: 2.56

## 2021-09-23 ENCOUNTER — HOSPITAL ENCOUNTER (OUTPATIENT)
Dept: LAB | Facility: MEDICAL CENTER | Age: 66
End: 2021-09-23
Attending: FAMILY MEDICINE
Payer: MEDICARE

## 2021-09-23 LAB
ALBUMIN SERPL BCP-MCNC: 4.7 G/DL (ref 3.2–4.9)
ALBUMIN/GLOB SERPL: 2 G/DL
ALP SERPL-CCNC: 83 U/L (ref 30–99)
ALT SERPL-CCNC: 25 U/L (ref 2–50)
ANION GAP SERPL CALC-SCNC: 12 MMOL/L (ref 7–16)
APPEARANCE UR: CLEAR
AST SERPL-CCNC: 30 U/L (ref 12–45)
BACTERIA #/AREA URNS HPF: ABNORMAL /HPF
BASOPHILS # BLD AUTO: 0.3 % (ref 0–1.8)
BASOPHILS # BLD: 0.01 K/UL (ref 0–0.12)
BILIRUB SERPL-MCNC: 0.8 MG/DL (ref 0.1–1.5)
BILIRUB UR QL STRIP.AUTO: NEGATIVE
BUN SERPL-MCNC: 17 MG/DL (ref 8–22)
CALCIUM SERPL-MCNC: 9.9 MG/DL (ref 8.5–10.5)
CHLORIDE SERPL-SCNC: 103 MMOL/L (ref 96–112)
CHOLEST SERPL-MCNC: 162 MG/DL (ref 100–199)
CO2 SERPL-SCNC: 23 MMOL/L (ref 20–33)
COLOR UR: YELLOW
CREAT SERPL-MCNC: 0.81 MG/DL (ref 0.5–1.4)
EOSINOPHIL # BLD AUTO: 0.01 K/UL (ref 0–0.51)
EOSINOPHIL NFR BLD: 0.3 % (ref 0–6.9)
EPI CELLS #/AREA URNS HPF: ABNORMAL /HPF
ERYTHROCYTE [DISTWIDTH] IN BLOOD BY AUTOMATED COUNT: 43.8 FL (ref 35.9–50)
EST. AVERAGE GLUCOSE BLD GHB EST-MCNC: 105 MG/DL
FASTING STATUS PATIENT QL REPORTED: NORMAL
FERRITIN SERPL-MCNC: 172 NG/ML (ref 10–291)
GLOBULIN SER CALC-MCNC: 2.4 G/DL (ref 1.9–3.5)
GLUCOSE SERPL-MCNC: 80 MG/DL (ref 65–99)
GLUCOSE UR STRIP.AUTO-MCNC: NEGATIVE MG/DL
HBA1C MFR BLD: 5.3 % (ref 4–5.6)
HCT VFR BLD AUTO: 41.8 % (ref 37–47)
HDLC SERPL-MCNC: 64 MG/DL
HGB BLD-MCNC: 13.8 G/DL (ref 12–16)
HYALINE CASTS #/AREA URNS LPF: ABNORMAL /LPF
IMM GRANULOCYTES # BLD AUTO: 0.01 K/UL (ref 0–0.11)
IMM GRANULOCYTES NFR BLD AUTO: 0.3 % (ref 0–0.9)
IRON SATN MFR SERPL: 30 % (ref 15–55)
IRON SERPL-MCNC: 96 UG/DL (ref 40–170)
KETONES UR STRIP.AUTO-MCNC: 15 MG/DL
LDLC SERPL CALC-MCNC: 84 MG/DL
LEUKOCYTE ESTERASE UR QL STRIP.AUTO: ABNORMAL
LYMPHOCYTES # BLD AUTO: 0.78 K/UL (ref 1–4.8)
LYMPHOCYTES NFR BLD: 24.3 % (ref 22–41)
MCH RBC QN AUTO: 31.8 PG (ref 27–33)
MCHC RBC AUTO-ENTMCNC: 33 G/DL (ref 33.6–35)
MCV RBC AUTO: 96.3 FL (ref 81.4–97.8)
MICRO URNS: ABNORMAL
MONOCYTES # BLD AUTO: 0.23 K/UL (ref 0–0.85)
MONOCYTES NFR BLD AUTO: 7.2 % (ref 0–13.4)
NEUTROPHILS # BLD AUTO: 2.17 K/UL (ref 2–7.15)
NEUTROPHILS NFR BLD: 67.6 % (ref 44–72)
NITRITE UR QL STRIP.AUTO: NEGATIVE
NRBC # BLD AUTO: 0 K/UL
NRBC BLD-RTO: 0 /100 WBC
PH UR STRIP.AUTO: 5.5 [PH] (ref 5–8)
PLATELET # BLD AUTO: 179 K/UL (ref 164–446)
PMV BLD AUTO: 11.2 FL (ref 9–12.9)
POTASSIUM SERPL-SCNC: 3.6 MMOL/L (ref 3.6–5.5)
PROT SERPL-MCNC: 7.1 G/DL (ref 6–8.2)
PROT UR QL STRIP: NEGATIVE MG/DL
RBC # BLD AUTO: 4.34 M/UL (ref 4.2–5.4)
RBC # URNS HPF: ABNORMAL /HPF
RBC UR QL AUTO: NEGATIVE
SODIUM SERPL-SCNC: 138 MMOL/L (ref 135–145)
SP GR UR STRIP.AUTO: 1.02
T3FREE SERPL-MCNC: 2.28 PG/ML (ref 2–4.4)
T4 FREE SERPL-MCNC: 1.06 NG/DL (ref 0.93–1.7)
TIBC SERPL-MCNC: 324 UG/DL (ref 250–450)
TRIGL SERPL-MCNC: 68 MG/DL (ref 0–149)
TSH SERPL DL<=0.005 MIU/L-ACNC: 2.01 UIU/ML (ref 0.38–5.33)
UIBC SERPL-MCNC: 228 UG/DL (ref 110–370)
UROBILINOGEN UR STRIP.AUTO-MCNC: 1 MG/DL
VIT B12 SERPL-MCNC: 372 PG/ML (ref 211–911)
WBC # BLD AUTO: 3.2 K/UL (ref 4.8–10.8)
WBC #/AREA URNS HPF: ABNORMAL /HPF

## 2021-09-23 PROCEDURE — 80053 COMPREHEN METABOLIC PANEL: CPT

## 2021-09-23 PROCEDURE — 85025 COMPLETE CBC W/AUTO DIFF WBC: CPT

## 2021-09-23 PROCEDURE — 82728 ASSAY OF FERRITIN: CPT

## 2021-09-23 PROCEDURE — 82607 VITAMIN B-12: CPT

## 2021-09-23 PROCEDURE — 36415 COLL VENOUS BLD VENIPUNCTURE: CPT

## 2021-09-23 PROCEDURE — 84439 ASSAY OF FREE THYROXINE: CPT

## 2021-09-23 PROCEDURE — 83550 IRON BINDING TEST: CPT

## 2021-09-23 PROCEDURE — 81001 URINALYSIS AUTO W/SCOPE: CPT

## 2021-09-23 PROCEDURE — 80061 LIPID PANEL: CPT

## 2021-09-23 PROCEDURE — 84481 FREE ASSAY (FT-3): CPT

## 2021-09-23 PROCEDURE — 83036 HEMOGLOBIN GLYCOSYLATED A1C: CPT | Mod: GA

## 2021-09-23 PROCEDURE — 83540 ASSAY OF IRON: CPT

## 2021-09-23 PROCEDURE — 84443 ASSAY THYROID STIM HORMONE: CPT

## 2021-09-23 PROCEDURE — 86480 TB TEST CELL IMMUN MEASURE: CPT

## 2021-09-24 LAB
GAMMA INTERFERON BACKGROUND BLD IA-ACNC: 0.04 IU/ML
M TB IFN-G BLD-IMP: NEGATIVE
M TB IFN-G CD4+ BCKGRND COR BLD-ACNC: 0.01 IU/ML
MITOGEN IGNF BCKGRD COR BLD-ACNC: 9.81 IU/ML
QFT TB2 - NIL TBQ2: 0 IU/ML

## 2021-12-06 ENCOUNTER — HOSPITAL ENCOUNTER (OUTPATIENT)
Dept: LAB | Facility: MEDICAL CENTER | Age: 66
End: 2021-12-06
Attending: FAMILY MEDICINE
Payer: MEDICARE

## 2021-12-06 PROCEDURE — 36415 COLL VENOUS BLD VENIPUNCTURE: CPT

## 2021-12-06 PROCEDURE — 85025 COMPLETE CBC W/AUTO DIFF WBC: CPT

## 2021-12-07 LAB
BASOPHILS # BLD AUTO: 0.5 % (ref 0–1.8)
BASOPHILS # BLD: 0.02 K/UL (ref 0–0.12)
EOSINOPHIL # BLD AUTO: 0.04 K/UL (ref 0–0.51)
EOSINOPHIL NFR BLD: 1.1 % (ref 0–6.9)
ERYTHROCYTE [DISTWIDTH] IN BLOOD BY AUTOMATED COUNT: 46.1 FL (ref 35.9–50)
HCT VFR BLD AUTO: 41 % (ref 37–47)
HGB BLD-MCNC: 13.4 G/DL (ref 12–16)
IMM GRANULOCYTES # BLD AUTO: 0.01 K/UL (ref 0–0.11)
IMM GRANULOCYTES NFR BLD AUTO: 0.3 % (ref 0–0.9)
LYMPHOCYTES # BLD AUTO: 0.81 K/UL (ref 1–4.8)
LYMPHOCYTES NFR BLD: 21.6 % (ref 22–41)
MCH RBC QN AUTO: 31.2 PG (ref 27–33)
MCHC RBC AUTO-ENTMCNC: 32.7 G/DL (ref 33.6–35)
MCV RBC AUTO: 95.6 FL (ref 81.4–97.8)
MONOCYTES # BLD AUTO: 0.32 K/UL (ref 0–0.85)
MONOCYTES NFR BLD AUTO: 8.5 % (ref 0–13.4)
NEUTROPHILS # BLD AUTO: 2.55 K/UL (ref 2–7.15)
NEUTROPHILS NFR BLD: 68 % (ref 44–72)
NRBC # BLD AUTO: 0 K/UL
NRBC BLD-RTO: 0 /100 WBC
PLATELET # BLD AUTO: 194 K/UL (ref 164–446)
PMV BLD AUTO: 11.1 FL (ref 9–12.9)
RBC # BLD AUTO: 4.29 M/UL (ref 4.2–5.4)
WBC # BLD AUTO: 3.8 K/UL (ref 4.8–10.8)

## 2021-12-23 ENCOUNTER — HOSPITAL ENCOUNTER (OUTPATIENT)
Facility: MEDICAL CENTER | Age: 66
End: 2021-12-23
Attending: INTERNAL MEDICINE
Payer: MEDICARE

## 2021-12-23 PROCEDURE — 86038 ANTINUCLEAR ANTIBODIES: CPT

## 2021-12-28 LAB — NUCLEAR IGG SER QL IA: NORMAL

## 2022-01-10 PROBLEM — R07.1 PAINFUL RESPIRATION: Status: ACTIVE | Noted: 2022-01-10

## 2022-01-10 PROBLEM — M51.369 DDD (DEGENERATIVE DISC DISEASE), LUMBAR: Status: ACTIVE | Noted: 2022-01-10

## 2022-01-10 PROBLEM — M51.36 DDD (DEGENERATIVE DISC DISEASE), LUMBAR: Status: ACTIVE | Noted: 2022-01-10

## 2022-01-10 PROBLEM — G47.00 INSOMNIA: Status: ACTIVE | Noted: 2022-01-10

## 2022-01-31 DIAGNOSIS — E78.5 DYSLIPIDEMIA: ICD-10-CM

## 2022-02-03 RX ORDER — ATORVASTATIN CALCIUM 40 MG/1
40 TABLET, FILM COATED ORAL DAILY
Qty: 90 TABLET | Refills: 3 | Status: SHIPPED | OUTPATIENT
Start: 2022-02-03

## 2022-02-10 ENCOUNTER — APPOINTMENT (OUTPATIENT)
Dept: RADIOLOGY | Facility: MEDICAL CENTER | Age: 67
End: 2022-02-10

## 2022-02-23 ENCOUNTER — HOSPITAL ENCOUNTER (OUTPATIENT)
Dept: RADIOLOGY | Facility: MEDICAL CENTER | Age: 67
End: 2022-02-23
Attending: FAMILY MEDICINE
Payer: MEDICARE

## 2022-02-23 DIAGNOSIS — Z12.31 VISIT FOR SCREENING MAMMOGRAM: ICD-10-CM

## 2022-02-23 PROCEDURE — 77063 BREAST TOMOSYNTHESIS BI: CPT

## 2022-04-12 PROBLEM — R79.89 ELEVATED LIVER FUNCTION TESTS: Status: ACTIVE | Noted: 2022-04-12

## 2022-04-12 PROBLEM — M21.371 FOOT DROP, RIGHT: Status: ACTIVE | Noted: 2022-04-12
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
## 2022-04-13 PROBLEM — R10.9 ACUTE POSTOPERATIVE ABDOMINAL PAIN: Status: RESOLVED | Noted: 2019-08-08 | Resolved: 2022-04-13

## 2022-04-13 PROBLEM — G89.18 ACUTE POSTOPERATIVE ABDOMINAL PAIN: Status: RESOLVED | Noted: 2019-08-08 | Resolved: 2022-04-13

## 2022-04-13 PROBLEM — K35.80 APPENDICITIS, ACUTE: Status: RESOLVED | Noted: 2019-08-08 | Resolved: 2022-04-13

## 2022-04-13 PROBLEM — E78.5 HYPERLIPIDEMIA: Status: ACTIVE | Noted: 2022-04-13

## 2022-06-22 PROBLEM — J34.89 NASAL MUCOSA DRY: Status: ACTIVE | Noted: 2022-06-22

## 2022-06-22 PROBLEM — M25.552 LEFT HIP PAIN: Status: ACTIVE | Noted: 2022-06-22

## 2022-09-15 PROBLEM — R07.89 OTHER CHEST PAIN: Status: RESOLVED | Noted: 2019-06-26 | Resolved: 2022-09-15

## 2022-09-15 PROBLEM — R07.1 PAINFUL RESPIRATION: Status: RESOLVED | Noted: 2022-01-10 | Resolved: 2022-09-15

## 2022-09-15 PROBLEM — M25.552 LEFT HIP PAIN: Status: RESOLVED | Noted: 2022-06-22 | Resolved: 2022-09-15

## 2022-09-15 PROBLEM — M21.371 FOOT DROP, RIGHT: Status: RESOLVED | Noted: 2022-04-12 | Resolved: 2022-09-15

## 2022-09-15 PROBLEM — J34.89 RHINORRHEA: Status: ACTIVE | Noted: 2022-09-15

## 2022-11-03 ENCOUNTER — PATIENT MESSAGE (OUTPATIENT)
Dept: HEALTH INFORMATION MANAGEMENT | Facility: OTHER | Age: 67
End: 2022-11-03

## 2022-12-09 PROBLEM — K22.70 BARRETT'S ESOPHAGUS WITHOUT DYSPLASIA: Status: ACTIVE | Noted: 2022-12-09

## 2023-01-10 ENCOUNTER — APPOINTMENT (RX ONLY)
Dept: URBAN - METROPOLITAN AREA CLINIC 22 | Facility: CLINIC | Age: 68
Setting detail: DERMATOLOGY
End: 2023-01-10

## 2023-01-10 DIAGNOSIS — Z71.89 OTHER SPECIFIED COUNSELING: ICD-10-CM

## 2023-01-10 DIAGNOSIS — L82.0 INFLAMED SEBORRHEIC KERATOSIS: ICD-10-CM

## 2023-01-10 DIAGNOSIS — L57.0 ACTINIC KERATOSIS: ICD-10-CM

## 2023-01-10 DIAGNOSIS — L82.1 OTHER SEBORRHEIC KERATOSIS: ICD-10-CM

## 2023-01-10 DIAGNOSIS — D22 MELANOCYTIC NEVI: ICD-10-CM

## 2023-01-10 DIAGNOSIS — D18.0 HEMANGIOMA: ICD-10-CM

## 2023-01-10 DIAGNOSIS — L81.4 OTHER MELANIN HYPERPIGMENTATION: ICD-10-CM

## 2023-01-10 PROBLEM — D18.01 HEMANGIOMA OF SKIN AND SUBCUTANEOUS TISSUE: Status: ACTIVE | Noted: 2023-01-10

## 2023-01-10 PROBLEM — D22.5 MELANOCYTIC NEVI OF TRUNK: Status: ACTIVE | Noted: 2023-01-10

## 2023-01-10 PROCEDURE — ? COUNSELING

## 2023-01-10 PROCEDURE — 17003 DESTRUCT PREMALG LES 2-14: CPT | Mod: 59

## 2023-01-10 PROCEDURE — ? SUNSCREEN RECOMMENDATIONS

## 2023-01-10 PROCEDURE — ? LIQUID NITROGEN

## 2023-01-10 PROCEDURE — 17110 DESTRUCTION B9 LES UP TO 14: CPT

## 2023-01-10 PROCEDURE — 17000 DESTRUCT PREMALG LESION: CPT | Mod: 59

## 2023-01-10 PROCEDURE — 99203 OFFICE O/P NEW LOW 30 MIN: CPT | Mod: 25

## 2023-01-10 ASSESSMENT — LOCATION SIMPLE DESCRIPTION DERM
LOCATION SIMPLE: LEFT FOREARM
LOCATION SIMPLE: RIGHT CHEEK
LOCATION SIMPLE: RIGHT ANTERIOR NECK
LOCATION SIMPLE: LEFT EYEBROW
LOCATION SIMPLE: RIGHT UPPER BACK
LOCATION SIMPLE: LEFT UPPER BACK
LOCATION SIMPLE: ABDOMEN

## 2023-01-10 ASSESSMENT — LOCATION DETAILED DESCRIPTION DERM
LOCATION DETAILED: RIGHT MID-UPPER BACK
LOCATION DETAILED: LEFT LATERAL EYEBROW
LOCATION DETAILED: LEFT LATERAL ABDOMEN
LOCATION DETAILED: RIGHT INFERIOR ANTERIOR NECK
LOCATION DETAILED: LEFT PROXIMAL DORSAL FOREARM
LOCATION DETAILED: LEFT INFERIOR UPPER BACK
LOCATION DETAILED: RIGHT INFERIOR CENTRAL MALAR CHEEK

## 2023-01-10 ASSESSMENT — LOCATION ZONE DERM
LOCATION ZONE: ARM
LOCATION ZONE: FACE
LOCATION ZONE: NECK
LOCATION ZONE: TRUNK

## 2023-01-10 NOTE — PROCEDURE: LIQUID NITROGEN
Consent: The patient's consent was obtained including but not limited to risks of crusting, scabbing, blistering, scarring, darker or lighter pigmentary change, recurrence, incomplete removal and infection.
Show Aperture Variable?: Yes
Detail Level: Detailed
Number Of Freeze-Thaw Cycles: 2 freeze-thaw cycles
Render Post-Care Instructions In Note?: no
Post-Care Instructions: I reviewed with the patient in detail post-care instructions. Patient is to wear sunprotection, and avoid picking at any of the treated lesions. Pt may apply Vaseline to crusted or scabbing areas.
Duration Of Freeze Thaw-Cycle (Seconds): 3
Spray Paint Text: The liquid nitrogen was applied to the skin utilizing a spray paint frosting technique.
Consent: The patient's mom’s consent was obtained including but not limited to risks of crusting, scabbing, blistering, scarring, darker or lighter pigmentary change, recurrence, incomplete removal and infection.
Medical Necessity Clause: This procedure was medically necessary because the lesions that were treated were:
Medical Necessity Information: It is in your best interest to select a reason for this procedure from the list below. All of these items fulfill various CMS LCD requirements except the new and changing color options.

## 2023-02-03 ENCOUNTER — APPOINTMENT (OUTPATIENT)
Dept: RADIOLOGY | Facility: MEDICAL CENTER | Age: 68
End: 2023-02-03
Attending: FAMILY MEDICINE
Payer: MEDICARE

## 2023-02-03 ENCOUNTER — APPOINTMENT (OUTPATIENT)
Dept: RADIOLOGY | Facility: MEDICAL CENTER | Age: 68
End: 2023-02-03
Attending: EMERGENCY MEDICINE
Payer: MEDICARE

## 2023-02-03 ENCOUNTER — APPOINTMENT (OUTPATIENT)
Dept: CARDIOLOGY | Facility: MEDICAL CENTER | Age: 68
End: 2023-02-03
Attending: FAMILY MEDICINE
Payer: MEDICARE

## 2023-02-03 ENCOUNTER — HOSPITAL ENCOUNTER (OUTPATIENT)
Facility: MEDICAL CENTER | Age: 68
End: 2023-02-04
Attending: EMERGENCY MEDICINE | Admitting: FAMILY MEDICINE
Payer: MEDICARE

## 2023-02-03 DIAGNOSIS — R42 VERTIGO: ICD-10-CM

## 2023-02-03 DIAGNOSIS — R07.9 CHEST PAIN, UNSPECIFIED TYPE: ICD-10-CM

## 2023-02-03 PROBLEM — R05.9 COUGH: Status: ACTIVE | Noted: 2023-02-03

## 2023-02-03 LAB
ALBUMIN SERPL BCP-MCNC: 4.5 G/DL (ref 3.2–4.9)
ALBUMIN/GLOB SERPL: 1.8 G/DL
ALP SERPL-CCNC: 93 U/L (ref 30–99)
ALT SERPL-CCNC: 27 U/L (ref 2–50)
ANION GAP SERPL CALC-SCNC: 12 MMOL/L (ref 7–16)
APTT PPP: 26 SEC (ref 24.7–36)
AST SERPL-CCNC: 32 U/L (ref 12–45)
BASOPHILS # BLD AUTO: 0.3 % (ref 0–1.8)
BASOPHILS # BLD: 0.01 K/UL (ref 0–0.12)
BILIRUB SERPL-MCNC: 0.8 MG/DL (ref 0.1–1.5)
BUN SERPL-MCNC: 20 MG/DL (ref 8–22)
CALCIUM ALBUM COR SERPL-MCNC: 9.2 MG/DL (ref 8.5–10.5)
CALCIUM SERPL-MCNC: 9.6 MG/DL (ref 8.4–10.2)
CHLORIDE SERPL-SCNC: 104 MMOL/L (ref 96–112)
CO2 SERPL-SCNC: 22 MMOL/L (ref 20–33)
CREAT SERPL-MCNC: 0.82 MG/DL (ref 0.5–1.4)
EKG IMPRESSION: NORMAL
EOSINOPHIL # BLD AUTO: 0.04 K/UL (ref 0–0.51)
EOSINOPHIL NFR BLD: 1.1 % (ref 0–6.9)
ERYTHROCYTE [DISTWIDTH] IN BLOOD BY AUTOMATED COUNT: 41.1 FL (ref 35.9–50)
EST. AVERAGE GLUCOSE BLD GHB EST-MCNC: 103 MG/DL
FLUAV RNA SPEC QL NAA+PROBE: NEGATIVE
FLUBV RNA SPEC QL NAA+PROBE: NEGATIVE
GFR SERPLBLD CREATININE-BSD FMLA CKD-EPI: 77 ML/MIN/1.73 M 2
GLOBULIN SER CALC-MCNC: 2.5 G/DL (ref 1.9–3.5)
GLUCOSE SERPL-MCNC: 87 MG/DL (ref 65–99)
HBA1C MFR BLD: 5.2 % (ref 4–5.6)
HCT VFR BLD AUTO: 40.3 % (ref 37–47)
HGB BLD-MCNC: 13.5 G/DL (ref 12–16)
IMM GRANULOCYTES # BLD AUTO: 0.01 K/UL (ref 0–0.11)
IMM GRANULOCYTES NFR BLD AUTO: 0.3 % (ref 0–0.9)
INR PPP: 1.06 (ref 0.87–1.13)
LV EJECT FRACT  99904: 65
LYMPHOCYTES # BLD AUTO: 0.94 K/UL (ref 1–4.8)
LYMPHOCYTES NFR BLD: 26.7 % (ref 22–41)
MCH RBC QN AUTO: 31.5 PG (ref 27–33)
MCHC RBC AUTO-ENTMCNC: 33.5 G/DL (ref 33.6–35)
MCV RBC AUTO: 93.9 FL (ref 81.4–97.8)
MONOCYTES # BLD AUTO: 0.35 K/UL (ref 0–0.85)
MONOCYTES NFR BLD AUTO: 9.9 % (ref 0–13.4)
NEUTROPHILS # BLD AUTO: 2.17 K/UL (ref 2–7.15)
NEUTROPHILS NFR BLD: 61.7 % (ref 44–72)
NRBC # BLD AUTO: 0 K/UL
NRBC BLD-RTO: 0 /100 WBC
NT-PROBNP SERPL IA-MCNC: 112 PG/ML (ref 0–125)
PLATELET # BLD AUTO: 187 K/UL (ref 164–446)
PMV BLD AUTO: 9.9 FL (ref 9–12.9)
POTASSIUM SERPL-SCNC: 3.8 MMOL/L (ref 3.6–5.5)
PROT SERPL-MCNC: 7 G/DL (ref 6–8.2)
PROTHROMBIN TIME: 13.7 SEC (ref 12–14.6)
RBC # BLD AUTO: 4.29 M/UL (ref 4.2–5.4)
RSV RNA SPEC QL NAA+PROBE: NEGATIVE
SARS-COV-2 RNA RESP QL NAA+PROBE: NOTDETECTED
SODIUM SERPL-SCNC: 138 MMOL/L (ref 135–145)
SPECIMEN SOURCE: NORMAL
TROPONIN T SERPL-MCNC: 6 NG/L (ref 6–19)
TROPONIN T SERPL-MCNC: 7 NG/L (ref 6–19)
TROPONIN T SERPL-MCNC: 7 NG/L (ref 6–19)
TSH SERPL DL<=0.005 MIU/L-ACNC: 2.37 UIU/ML (ref 0.38–5.33)
WBC # BLD AUTO: 3.5 K/UL (ref 4.8–10.8)

## 2023-02-03 PROCEDURE — 93306 TTE W/DOPPLER COMPLETE: CPT | Mod: 26 | Performed by: INTERNAL MEDICINE

## 2023-02-03 PROCEDURE — 83880 ASSAY OF NATRIURETIC PEPTIDE: CPT

## 2023-02-03 PROCEDURE — G0378 HOSPITAL OBSERVATION PER HR: HCPCS

## 2023-02-03 PROCEDURE — 700102 HCHG RX REV CODE 250 W/ 637 OVERRIDE(OP): Performed by: EMERGENCY MEDICINE

## 2023-02-03 PROCEDURE — 71045 X-RAY EXAM CHEST 1 VIEW: CPT

## 2023-02-03 PROCEDURE — 80053 COMPREHEN METABOLIC PANEL: CPT

## 2023-02-03 PROCEDURE — 700111 HCHG RX REV CODE 636 W/ 250 OVERRIDE (IP): Performed by: EMERGENCY MEDICINE

## 2023-02-03 PROCEDURE — 94760 N-INVAS EAR/PLS OXIMETRY 1: CPT

## 2023-02-03 PROCEDURE — 84443 ASSAY THYROID STIM HORMONE: CPT

## 2023-02-03 PROCEDURE — C9803 HOPD COVID-19 SPEC COLLECT: HCPCS | Performed by: FAMILY MEDICINE

## 2023-02-03 PROCEDURE — 85025 COMPLETE CBC W/AUTO DIFF WBC: CPT

## 2023-02-03 PROCEDURE — 93306 TTE W/DOPPLER COMPLETE: CPT

## 2023-02-03 PROCEDURE — 99223 1ST HOSP IP/OBS HIGH 75: CPT | Performed by: FAMILY MEDICINE

## 2023-02-03 PROCEDURE — 96374 THER/PROPH/DIAG INJ IV PUSH: CPT | Mod: XU

## 2023-02-03 PROCEDURE — A9270 NON-COVERED ITEM OR SERVICE: HCPCS | Performed by: EMERGENCY MEDICINE

## 2023-02-03 PROCEDURE — 36415 COLL VENOUS BLD VENIPUNCTURE: CPT | Mod: XU

## 2023-02-03 PROCEDURE — 93880 EXTRACRANIAL BILAT STUDY: CPT

## 2023-02-03 PROCEDURE — 84484 ASSAY OF TROPONIN QUANT: CPT

## 2023-02-03 PROCEDURE — 83036 HEMOGLOBIN GLYCOSYLATED A1C: CPT

## 2023-02-03 PROCEDURE — A9270 NON-COVERED ITEM OR SERVICE: HCPCS | Performed by: FAMILY MEDICINE

## 2023-02-03 PROCEDURE — 700102 HCHG RX REV CODE 250 W/ 637 OVERRIDE(OP): Performed by: FAMILY MEDICINE

## 2023-02-03 PROCEDURE — 70551 MRI BRAIN STEM W/O DYE: CPT

## 2023-02-03 PROCEDURE — 99285 EMERGENCY DEPT VISIT HI MDM: CPT

## 2023-02-03 PROCEDURE — 85730 THROMBOPLASTIN TIME PARTIAL: CPT

## 2023-02-03 PROCEDURE — 85610 PROTHROMBIN TIME: CPT

## 2023-02-03 PROCEDURE — 0241U HCHG SARS-COV-2 COVID-19 NFCT DS RESP RNA 4 TRGT MIC: CPT

## 2023-02-03 PROCEDURE — 70450 CT HEAD/BRAIN W/O DYE: CPT

## 2023-02-03 PROCEDURE — 93005 ELECTROCARDIOGRAM TRACING: CPT

## 2023-02-03 PROCEDURE — 36415 COLL VENOUS BLD VENIPUNCTURE: CPT

## 2023-02-03 RX ORDER — VERAPAMIL HYDROCHLORIDE 240 MG/1
240 TABLET, FILM COATED, EXTENDED RELEASE ORAL DAILY
Status: DISCONTINUED | OUTPATIENT
Start: 2023-02-04 | End: 2023-02-04 | Stop reason: HOSPADM

## 2023-02-03 RX ORDER — ASPIRIN 300 MG/1
300 SUPPOSITORY RECTAL DAILY
Status: DISCONTINUED | OUTPATIENT
Start: 2023-02-04 | End: 2023-02-04 | Stop reason: HOSPADM

## 2023-02-03 RX ORDER — ACETAMINOPHEN 325 MG/1
650 TABLET ORAL EVERY 6 HOURS PRN
Status: DISCONTINUED | OUTPATIENT
Start: 2023-02-03 | End: 2023-02-04 | Stop reason: HOSPADM

## 2023-02-03 RX ORDER — PAROXETINE 10 MG/1
10 TABLET, FILM COATED ORAL DAILY
COMMUNITY
End: 2023-05-26

## 2023-02-03 RX ORDER — FLUTICASONE PROPIONATE 50 MCG
2 SPRAY, SUSPENSION (ML) NASAL DAILY
Status: DISCONTINUED | OUTPATIENT
Start: 2023-02-03 | End: 2023-02-04 | Stop reason: HOSPADM

## 2023-02-03 RX ORDER — ONDANSETRON 4 MG/1
4 TABLET, ORALLY DISINTEGRATING ORAL EVERY 4 HOURS PRN
Status: DISCONTINUED | OUTPATIENT
Start: 2023-02-03 | End: 2023-02-04 | Stop reason: HOSPADM

## 2023-02-03 RX ORDER — MECLIZINE HYDROCHLORIDE 25 MG/1
25 TABLET ORAL 3 TIMES DAILY PRN
Status: DISCONTINUED | OUTPATIENT
Start: 2023-02-03 | End: 2023-02-04 | Stop reason: HOSPADM

## 2023-02-03 RX ORDER — ASPIRIN 300 MG/1
300 SUPPOSITORY RECTAL ONCE
Status: COMPLETED | OUTPATIENT
Start: 2023-02-03 | End: 2023-02-03

## 2023-02-03 RX ORDER — CETIRIZINE HYDROCHLORIDE 10 MG/1
10 TABLET ORAL DAILY
Status: DISCONTINUED | OUTPATIENT
Start: 2023-02-03 | End: 2023-02-04 | Stop reason: HOSPADM

## 2023-02-03 RX ORDER — GUAIFENESIN/DEXTROMETHORPHAN 100-10MG/5
10 SYRUP ORAL EVERY 6 HOURS PRN
Status: DISCONTINUED | OUTPATIENT
Start: 2023-02-03 | End: 2023-02-04 | Stop reason: HOSPADM

## 2023-02-03 RX ORDER — POLYETHYLENE GLYCOL 3350 17 G/17G
1 POWDER, FOR SOLUTION ORAL
Status: DISCONTINUED | OUTPATIENT
Start: 2023-02-03 | End: 2023-02-04 | Stop reason: HOSPADM

## 2023-02-03 RX ORDER — ALBUTEROL SULFATE 90 UG/1
2 AEROSOL, METERED RESPIRATORY (INHALATION) EVERY 4 HOURS PRN
Status: DISCONTINUED | OUTPATIENT
Start: 2023-02-03 | End: 2023-02-04 | Stop reason: HOSPADM

## 2023-02-03 RX ORDER — DOXYCYCLINE HYCLATE 100 MG
100 TABLET ORAL 2 TIMES DAILY
Status: ON HOLD | COMMUNITY
Start: 2023-01-31 | End: 2023-02-04

## 2023-02-03 RX ORDER — ASPIRIN 325 MG
325-650 TABLET ORAL EVERY 6 HOURS PRN
COMMUNITY
End: 2023-10-24

## 2023-02-03 RX ORDER — PAROXETINE 10 MG/1
10 TABLET, FILM COATED ORAL DAILY
Status: DISCONTINUED | OUTPATIENT
Start: 2023-02-04 | End: 2023-02-04 | Stop reason: HOSPADM

## 2023-02-03 RX ORDER — OXYCODONE HYDROCHLORIDE 10 MG/1
10 TABLET ORAL
Status: DISCONTINUED | OUTPATIENT
Start: 2023-02-03 | End: 2023-02-04 | Stop reason: HOSPADM

## 2023-02-03 RX ORDER — ATORVASTATIN CALCIUM 40 MG/1
80 TABLET, FILM COATED ORAL EVERY EVENING
Status: DISCONTINUED | OUTPATIENT
Start: 2023-02-04 | End: 2023-02-04 | Stop reason: HOSPADM

## 2023-02-03 RX ORDER — AMOXICILLIN 250 MG
2 CAPSULE ORAL 2 TIMES DAILY
Status: DISCONTINUED | OUTPATIENT
Start: 2023-02-03 | End: 2023-02-04 | Stop reason: HOSPADM

## 2023-02-03 RX ORDER — ASPIRIN 81 MG/1
324 TABLET, CHEWABLE ORAL DAILY
Status: DISCONTINUED | OUTPATIENT
Start: 2023-02-04 | End: 2023-02-04 | Stop reason: HOSPADM

## 2023-02-03 RX ORDER — LOSARTAN POTASSIUM 25 MG/1
25 TABLET ORAL DAILY
Status: DISCONTINUED | OUTPATIENT
Start: 2023-02-04 | End: 2023-02-04 | Stop reason: HOSPADM

## 2023-02-03 RX ORDER — MORPHINE SULFATE 4 MG/ML
4 INJECTION INTRAVENOUS
Status: DISCONTINUED | OUTPATIENT
Start: 2023-02-03 | End: 2023-02-04 | Stop reason: HOSPADM

## 2023-02-03 RX ORDER — OXYCODONE HYDROCHLORIDE 5 MG/1
5 TABLET ORAL
Status: DISCONTINUED | OUTPATIENT
Start: 2023-02-03 | End: 2023-02-04 | Stop reason: HOSPADM

## 2023-02-03 RX ORDER — LORAZEPAM 2 MG/ML
0.5 INJECTION INTRAMUSCULAR ONCE
Status: COMPLETED | OUTPATIENT
Start: 2023-02-03 | End: 2023-02-03

## 2023-02-03 RX ORDER — ASPIRIN 325 MG
325 TABLET ORAL DAILY
Status: DISCONTINUED | OUTPATIENT
Start: 2023-02-04 | End: 2023-02-04 | Stop reason: HOSPADM

## 2023-02-03 RX ORDER — BISACODYL 10 MG
10 SUPPOSITORY, RECTAL RECTAL
Status: DISCONTINUED | OUTPATIENT
Start: 2023-02-03 | End: 2023-02-04 | Stop reason: HOSPADM

## 2023-02-03 RX ORDER — ASPIRIN 81 MG/1
324 TABLET, CHEWABLE ORAL ONCE
Status: COMPLETED | OUTPATIENT
Start: 2023-02-03 | End: 2023-02-03

## 2023-02-03 RX ORDER — ONDANSETRON 2 MG/ML
4 INJECTION INTRAMUSCULAR; INTRAVENOUS EVERY 4 HOURS PRN
Status: DISCONTINUED | OUTPATIENT
Start: 2023-02-03 | End: 2023-02-04 | Stop reason: HOSPADM

## 2023-02-03 RX ORDER — OMEPRAZOLE 20 MG/1
20 CAPSULE, DELAYED RELEASE ORAL DAILY
Status: DISCONTINUED | OUTPATIENT
Start: 2023-02-04 | End: 2023-02-04 | Stop reason: HOSPADM

## 2023-02-03 RX ORDER — NITROGLYCERIN 0.4 MG/1
0.4 TABLET SUBLINGUAL
Status: DISCONTINUED | OUTPATIENT
Start: 2023-02-03 | End: 2023-02-04 | Stop reason: HOSPADM

## 2023-02-03 RX ADMIN — ASPIRIN 81 MG CHEWABLE TABLET 324 MG: 81 TABLET CHEWABLE at 10:07

## 2023-02-03 RX ADMIN — LORAZEPAM 0.5 MG: 2 INJECTION INTRAMUSCULAR; INTRAVENOUS at 10:07

## 2023-02-03 RX ADMIN — SENNOSIDES AND DOCUSATE SODIUM 2 TABLET: 50; 8.6 TABLET ORAL at 17:32

## 2023-02-03 RX ADMIN — LIDOCAINE HYDROCHLORIDE 15 ML: 20 SOLUTION OROPHARYNGEAL at 13:22

## 2023-02-03 ASSESSMENT — HEART SCORE
RISK FACTORS: >2 RISK FACTORS OR HX OF ATHEROSCLEROTIC DISEASE
ECG: NON-SPECIFIC REPOLARIZATION DISTURBANCE
AGE: 65+
TROPONIN: LESS THAN OR EQUAL TO NORMAL LIMIT

## 2023-02-03 ASSESSMENT — COGNITIVE AND FUNCTIONAL STATUS - GENERAL
DAILY ACTIVITIY SCORE: 24
SUGGESTED CMS G CODE MODIFIER DAILY ACTIVITY: CH
SUGGESTED CMS G CODE MODIFIER MOBILITY: CJ
MOBILITY SCORE: 21
CLIMB 3 TO 5 STEPS WITH RAILING: A LOT
WALKING IN HOSPITAL ROOM: A LITTLE

## 2023-02-03 ASSESSMENT — ENCOUNTER SYMPTOMS
VOMITING: 0
ABDOMINAL PAIN: 0
NAUSEA: 0
COUGH: 1
CHILLS: 0
DIZZINESS: 1
HEADACHES: 1
FEVER: 0
FOCAL WEAKNESS: 0
SPUTUM PRODUCTION: 1
SHORTNESS OF BREATH: 1

## 2023-02-03 ASSESSMENT — LIFESTYLE VARIABLES
AVERAGE NUMBER OF DAYS PER WEEK YOU HAVE A DRINK CONTAINING ALCOHOL: 1
ON A TYPICAL DAY WHEN YOU DRINK ALCOHOL HOW MANY DRINKS DO YOU HAVE: 1
EVER HAD A DRINK FIRST THING IN THE MORNING TO STEADY YOUR NERVES TO GET RID OF A HANGOVER: NO
TOTAL SCORE: 0
HAVE PEOPLE ANNOYED YOU BY CRITICIZING YOUR DRINKING: NO
ALCOHOL_USE: YES
HAVE YOU EVER FELT YOU SHOULD CUT DOWN ON YOUR DRINKING: NO
EVER FELT BAD OR GUILTY ABOUT YOUR DRINKING: NO
CONSUMPTION TOTAL: POSITIVE
TOTAL SCORE: 0
HOW MANY TIMES IN THE PAST YEAR HAVE YOU HAD 5 OR MORE DRINKS IN A DAY: 3
TOTAL SCORE: 0

## 2023-02-03 ASSESSMENT — COPD QUESTIONNAIRES
COPD SCREENING SCORE: 7
DO YOU EVER COUGH UP ANY MUCUS OR PHLEGM?: YES, EVERY DAY
DURING THE PAST 4 WEEKS HOW MUCH DID YOU FEEL SHORT OF BREATH: MOST  OR ALL OF THE TIME
HAVE YOU SMOKED AT LEAST 100 CIGARETTES IN YOUR ENTIRE LIFE: NO/DON'T KNOW

## 2023-02-03 ASSESSMENT — PATIENT HEALTH QUESTIONNAIRE - PHQ9
8. MOVING OR SPEAKING SO SLOWLY THAT OTHER PEOPLE COULD HAVE NOTICED. OR THE OPPOSITE, BEING SO FIGETY OR RESTLESS THAT YOU HAVE BEEN MOVING AROUND A LOT MORE THAN USUAL: NOT AT ALL
SUM OF ALL RESPONSES TO PHQ QUESTIONS 1-9: 6
7. TROUBLE CONCENTRATING ON THINGS, SUCH AS READING THE NEWSPAPER OR WATCHING TELEVISION: NOT AT ALL
SUM OF ALL RESPONSES TO PHQ9 QUESTIONS 1 AND 2: 2
5. POOR APPETITE OR OVEREATING: NOT AT ALL
6. FEELING BAD ABOUT YOURSELF - OR THAT YOU ARE A FAILURE OR HAVE LET YOURSELF OR YOUR FAMILY DOWN: NOT AL ALL
9. THOUGHTS THAT YOU WOULD BE BETTER OFF DEAD, OR OF HURTING YOURSELF: NOT AT ALL
1. LITTLE INTEREST OR PLEASURE IN DOING THINGS: SEVERAL DAYS
2. FEELING DOWN, DEPRESSED, IRRITABLE, OR HOPELESS: SEVERAL DAYS
4. FEELING TIRED OR HAVING LITTLE ENERGY: NEARLY EVERY DAY
3. TROUBLE FALLING OR STAYING ASLEEP OR SLEEPING TOO MUCH: SEVERAL DAYS

## 2023-02-03 ASSESSMENT — FIBROSIS 4 INDEX: FIB4 SCORE: 2.1

## 2023-02-03 NOTE — ED TRIAGE NOTES
"Haley Hawthorne  .67 y.o.  Chief Complaint   Patient presents with    Chest Pain     \"Tightness\" x a couple of weeks      Dizziness     X a couple of weeks     Pt drive herself to the ER with above complaints.  She reports she has had these symptoms for a couple of weeks and decided to come to the ER today since the symptoms are not going away.  Pt reports a chest tightness discomfort of 5/10.  Pt is also anxious.  "

## 2023-02-03 NOTE — ASSESSMENT & PLAN NOTE
- Pt with complaint of room spinning - has been treated for vertigo in the past  - No other alarm neurological symptoms  - PRN Meclizine  - MRI to rule out posterior CVA  - CT brain negative  - Carotid u/s  - PT/OT/SLP  - Has full TMs bilaterally consistent with allergies, start Flomax and Zyrtec

## 2023-02-03 NOTE — ASSESSMENT & PLAN NOTE
The 10-year ASCVD risk score (Kyle TANG, et al., 2019) is: 6.5%    Values used to calculate the score:      Age: 67 years      Sex: Female      Is Non- : No      Diabetic: No      Tobacco smoker: No      Systolic Blood Pressure: 108 mmHg      Is BP treated: Yes      HDL Cholesterol: 64 mg/dL      Total Cholesterol: 162 mg/dL    - Atypical chest pain, some TTP over right chest wall - worse with deep inspiration and cough, sounds pleuritic  - Treadmill stress test in the    - [facility] in 2019 with nononcclusive disease  - ASA and statin  - Trial of GI cocktail PRN as her pain is substernal/epigastric and she has Rosado's esophagus  - Omeprazole   - Sounds largely pleuritic, could also be GI or anxiety related

## 2023-02-03 NOTE — H&P
"Hospital Medicine History & Physical Note    Date of Service  2/3/2023    Primary Care Physician  DIANNA Briggs    Consultants  none    Specialist Names: none    Code Status  Prior    Chief Complaint  Chief Complaint   Patient presents with    Chest Pain     \"Tightness\" x a couple of weeks      Dizziness     X a couple of weeks       History of Presenting Illness  Haley Hawthorne is a 67 y.o. female who presented 2/3/2023 with chest tightness. This is a female with a history of HLD, DDD, asthma,GERD, non obstructive CAD on [facility] in 2019, JOANIE, HTN, Rosado's esophagus and hx of breast cancer. She presented to hospital with chest tightness and dizziness x three weeks - the chest pain had been intermittent over the past three weeks, both night and day, describes as tightness, + associated cough for the same time period. She has had white and green sputum and the chest pain gets worse with coughing. She had just started a course of doxycycline on1/31. Previous cardiac workup included a [facility] 7/21 that demonstrated a nonobstructive 30% RCA lesion. The chest pain is substernal, no worse with food and not associated with diaphoresis, nausea or vomiting, and without radiation.     The dizziness started at the time, is worse with standing up and ambulation. Her dizziness is described as room spinning rather than lightheadedness and is worsened with positional changes - she does have an associated headache as well. She relates being diagnosed with vertigo in the past a few years ago, and it resolved on its own.     On presentation to the ER, pt had mildly elevated BP at 155/79 and there were no notable lab abnormalities - initial troponin was negative, CXR was clear, EKG showed chronic left axis deviation with LAFB and CT brain done for vertigo symptoms was negative. She has no focal weakness or tingling.    I discussed the plan of care with patient and ERP .    Review of Systems  Review of Systems "   Constitutional:  Negative for chills, fever and malaise/fatigue.   Respiratory:  Positive for cough, sputum production and shortness of breath.    Cardiovascular:  Positive for chest pain (substernal). Negative for leg swelling.   Gastrointestinal:  Negative for abdominal pain, nausea and vomiting.   Neurological:  Positive for dizziness and headaches. Negative for focal weakness.   All other systems reviewed and are negative.    Past Medical History   has a past medical history of Acute postoperative abdominal pain (8/8/2019), Allergy, Appendicitis, acute (8/8/2019), Arrhythmia, Arthritis, Asthma, Back pain, Blood transfusion without reported diagnosis, BRCA1 negative, BRCA2 negative, Breast cancer (HCC), Bronchitis, CAD (coronary artery disease), Cancer (HCC) (2015), Cataract, Chickenpox, Depression, Foot drop, right (4/12/2022), GERD (gastroesophageal reflux disease), Heart burn, High cholesterol, Hypertension, Indigestion, Left hip pain (6/22/2022), Nasal drainage, Osteoporosis, Other chest pain (6/26/2019), Pain (06/2018), Painful respiration (1/10/2022), Pneumonia (2016), Snoring, and Tonsillitis.    Surgical History   has a past surgical history that includes breast biopsy (unknown); tonsillectomy; partial mastectomy (4/21/2015); lumpectomy (4/21/2015); ganglion excision (Left, 9/25/2018); pr lap,appendectomy (8/8/2019); hysterectomy laparoscopy (2006); carpal tunnel release (2013); finger arthroplasty (Right, 6/5/2018); tendon transfer (6/5/2018); finger arthroplasty (Left, 9/25/2018); repair, tendon, lower extremity, using tendon graft (Left, 9/25/2018); pr repair of nasal septum (N/A, 5/28/2020); turbinoplasty (Bilateral, 5/28/2020); abdominal exploration; appendectomy; and brca1&2 gen full seq dup/del.     Family History  family history includes Cancer in her brother and sister; Heart Disease in her brother; Sleep Apnea in her son; Stroke in her mother.   Family history reviewed with patient. There is  family history that is pertinent to the chief complaint.     Social History   reports that she quit smoking about 44 years ago. Her smoking use included cigarettes. She has a 10.00 pack-year smoking history. She has never used smokeless tobacco. She reports current alcohol use of about 8.4 oz per week. She reports that she does not use drugs.    Allergies  Allergies   Allergen Reactions    Sulfa Drugs Hives       Medications  Prior to Admission Medications   Prescriptions Last Dose Informant Patient Reported? Taking?   Cyanocobalamin (VITAMIN B-12 PO) 2/3/2023 at AM Patient Yes Yes   Sig: Take 1 Tablet by mouth every day.   PARoxetine (PAXIL) 10 MG Tab 2/3/2023 at AM Patient Yes Yes   Sig: Take 10 mg by mouth every day.   aspirin (ASA) 325 MG Tab 2/2/2023 at PRN Patient Yes Yes   Sig: Take 325-650 mg by mouth every 6 hours as needed. Indications: Pain   atorvastatin (LIPITOR) 40 MG Tab 2/3/2023 at AM Patient No No   Sig: Take 1 Tablet by mouth every day.   doxycycline (VIBRAMYCIN) 100 MG Tab 2/3/2023 at AM Patient Yes Yes   Sig: Take 100 mg by mouth 2 times a day. 7 day course   fluticasone (FLOVENT HFA) 44 MCG/ACT Aerosol 2/2/2023 at AM Patient Yes No   Sig: Inhale 2 Puffs 2 times a day as needed (Shortness of breath).   ibuprofen (MOTRIN) 600 MG Tab 2/2/2023 at PM Patient Yes No   Sig: Take 600 mg by mouth every 6 hours as needed. Indications: Pain   losartan (COZAAR) 25 MG Tab 2/3/2023 at AM Patient No No   Sig: Take 1 Tablet by mouth every day.   omeprazole (PRILOSEC) 20 MG CPDR 2/3/2023 at AM Patient Yes No   Sig: Take 20 mg by mouth every day.   verapamil ER (CALAN-SR) 240 MG Tab CR 2/3/2023 at AM Patient No No   Sig: TAKE 1 TABLET BY MOUTH EVERY DAY      Facility-Administered Medications: None       Physical Exam  Temp:  [36.2 °C (97.1 °F)] 36.2 °C (97.1 °F)  Pulse:  [65-70] 65  Resp:  [12-28] 12  BP: (118-155)/(61-90) 118/69  SpO2:  [93 %-96 %] 94 %  Blood Pressure : 118/69   Temperature: 36.2 °C (97.1 °F)    Pulse: 65   Respiration: 12   Pulse Oximetry: 94 %       Physical Exam  Vitals and nursing note reviewed.   Constitutional:       Appearance: Normal appearance.   HENT:      Head: Normocephalic and atraumatic.      Ears:      Comments: Bilateral full Tms that have a slight bulge, no erythema or pus     Nose: Nose normal.   Eyes:      Extraocular Movements: Extraocular movements intact.      Conjunctiva/sclera: Conjunctivae normal.      Pupils: Pupils are equal, round, and reactive to light.   Cardiovascular:      Rate and Rhythm: Normal rate and regular rhythm.      Heart sounds: No murmur heard.  Pulmonary:      Effort: Pulmonary effort is normal. No respiratory distress.      Breath sounds: Normal breath sounds. No wheezing or rales.   Abdominal:      General: Abdomen is flat. Bowel sounds are normal.      Palpations: Abdomen is soft.   Musculoskeletal:         General: No swelling.      Cervical back: Normal range of motion.      Right lower leg: No edema.      Left lower leg: No edema.   Skin:     General: Skin is warm and dry.      Coloration: Skin is not jaundiced or pale.   Neurological:      General: No focal deficit present.      Mental Status: She is alert and oriented to person, place, and time. Mental status is at baseline.   Psychiatric:         Mood and Affect: Mood is anxious.         Behavior: Behavior normal.       Laboratory:  Recent Labs     02/03/23  1010   WBC 3.5*   RBC 4.29   HEMOGLOBIN 13.5   HEMATOCRIT 40.3   MCV 93.9   MCH 31.5   MCHC 33.5*   RDW 41.1   PLATELETCT 187   MPV 9.9     Recent Labs     02/03/23  1010   SODIUM 138   POTASSIUM 3.8   CHLORIDE 104   CO2 22   GLUCOSE 87   BUN 20   CREATININE 0.82   CALCIUM 9.6     Recent Labs     02/03/23  1010   ALTSGPT 27   ASTSGOT 32   ALKPHOSPHAT 93   TBILIRUBIN 0.8   GLUCOSE 87     Recent Labs     02/03/23  1010   APTT 26.0   INR 1.06     Recent Labs     02/03/23  1010   NTPROBNP 112         Recent Labs     02/03/23  1010   TROPONINT 7        Imaging:  CT-HEAD W/O   Final Result      No evidence of acute intracranial process.         DX-CHEST-PORTABLE (1 VIEW)   Final Result      No evidence of acute cardiopulmonary process.          X-Ray:  I have personally reviewed the images and compared with prior images. and My impression is: No acute abnormalities  EKG:  I have personally reviewed the images and compared with prior images. and My impression is: chronic left axis deviation with LAFB    Assessment/Plan:  Justification for Admission Status  I anticipate this patient is appropriate for observation status at this time because pt to have stroke and ACS rule out, if negative, can discharge     Patient will need a  bed on EMERGENCY service .  The need is secondary to vertigo and chest tightness.    * Vertigo- (present on admission)  Assessment & Plan  - Pt with complaint of room spinning - has been treated for vertigo in the past  - No other alarm neurological symptoms  - PRN Meclizine  - MRI to rule out posterior CVA  - CT brain negative  - Carotid u/s  - PT/OT/SLP  - Has full TMs bilaterally consistent with allergies, start Flomax and Zyrtec    Chest tightness  Assessment & Plan  The 10-year ASCVD risk score (Kyle TANG, et al., 2019) is: 6.5%    Values used to calculate the score:      Age: 67 years      Sex: Female      Is Non- : No      Diabetic: No      Tobacco smoker: No      Systolic Blood Pressure: 108 mmHg      Is BP treated: Yes      HDL Cholesterol: 64 mg/dL      Total Cholesterol: 162 mg/dL    - Atypical chest pain, some TTP over right chest wall - worse with deep inspiration and cough, sounds pleuritic  - Treadmill stress test in the    - [facility] in 2019 with nononcclusive disease  - ASA and statin  - Trial of GI cocktail PRN as her pain is substernal/epigastric and she has Rosado's esophagus  - Omeprazole   - Sounds largely pleuritic, could also be GI or anxiety related     Cough  Assessment & Plan  - Pt was on  doxycycline as an outpatient started on 1/31   - No white count or infiltrate seen on CXR, clear breath sounds with good air movement  - Given the myriad of symptoms, will check a mini viral panel  - PRN albuterol  - RT consult  - Has history of asthma, but is moving air well - if viral panel negative, consider prednisone for possible cough variant asthma exacerbation    Rosado's esophagus without dysplasia- (present on admission)  Assessment & Plan  - Omeprazole    Hyperlipidemia- (present on admission)  Assessment & Plan  - statin    Gastroesophageal reflux disease- (present on admission)  Assessment & Plan  - Omeprazole and PRN GI cocktail    Essential hypertension, benign- (present on admission)  Assessment & Plan  - Out of the window of permissive hypertension, resume home meds        VTE prophylaxis: SCDs/TEDs and enoxaparin ppx - start Lovenox if MRI negative

## 2023-02-03 NOTE — ED PROVIDER NOTES
ED Provider Note    CHIEF COMPLAINT  Chest pain/dizziness      EXTERNAL RECORDS REVIEWED  Inpatient Notes hospitalizations cardiology notes and Outpatient Notes from 2020    HPI/ROS  LIMITATION TO HISTORY   Select: : None      Haley Hawthorne is a 67 y.o. female who presents for evaluation of chest pressure as well as dizziness.  Patient states over the last 2-week she has been having fairly constant tightness and heaviness diffusely in her chest.  She has a known history of coronary disease to have a coronary angiogram performed in 2020 which showed a 30% nonobstructive lesion in the right RCA.  Patient has a history of hypertension and dyslipidemia.  In addition to the tightness in her chest the patient has been having dizziness.  She states it feels like vertigo with everything spinning around.  She has associated frontal headache.  The dizziness is worse with head movement.  Patient denies recent: Or, chills, URI symptoms, cardiorespiratory symptoms, gastrointestinal symptoms.  No Other complaints.    PAST MEDICAL HISTORY   has a past medical history of Acute postoperative abdominal pain (8/8/2019), Allergy, Appendicitis, acute (8/8/2019), Arrhythmia, Arthritis, Asthma, Back pain, Blood transfusion without reported diagnosis, BRCA1 negative, BRCA2 negative, Breast cancer (HCC), Bronchitis, CAD (coronary artery disease), Cancer (HCC) (2015), Cataract, Chickenpox, Depression, Foot drop, right (4/12/2022), GERD (gastroesophageal reflux disease), Heart burn, High cholesterol, Hypertension, Indigestion, Left hip pain (6/22/2022), Nasal drainage, Osteoporosis, Other chest pain (6/26/2019), Pain (06/2018), Painful respiration (1/10/2022), Pneumonia (2016), Snoring, and Tonsillitis.    SURGICAL HISTORY   has a past surgical history that includes breast biopsy (unknown); tonsillectomy; partial mastectomy (4/21/2015); lumpectomy (4/21/2015); ganglion excision (Left, 9/25/2018); lap,appendectomy (8/8/2019);  "hysterectomy laparoscopy (); carpal tunnel release (); finger arthroplasty (Right, 2018); tendon transfer (2018); finger arthroplasty (Left, 2018); repair, tendon, lower extremity, using tendon graft (Left, 2018); repair of nasal septum (N/A, 2020); turbinoplasty (Bilateral, 2020); abdominal exploration; appendectomy; and brca1&2 gen full seq dup/del.    FAMILY HISTORY  Family History   Problem Relation Age of Onset    Stroke Mother     Cancer Sister     Heart Disease Brother     Cancer Brother     Sleep Apnea Son        SOCIAL HISTORY  Social History     Tobacco Use    Smoking status: Former     Packs/day: 1.00     Years: 10.00     Pack years: 10.00     Types: Cigarettes     Quit date: 1979     Years since quittin.1    Smokeless tobacco: Never   Vaping Use    Vaping Use: Never used   Substance and Sexual Activity    Alcohol use: Yes     Alcohol/week: 8.4 oz     Types: 14 Glasses of wine per week    Drug use: No    Sexual activity: Not on file       CURRENT MEDICATIONS  See nurses notes      ALLERGIES  Allergies   Allergen Reactions    Sulfa Drugs Hives       PHYSICAL EXAM  VITAL SIGNS: BP (!) 155/79   Pulse 66   Temp 36.2 °C (97.1 °F) (Temporal)   Resp 15   Ht 1.549 m (5' 1\")   Wt 62.4 kg (137 lb 9.1 oz)   SpO2 94%   BMI 25.99 kg/m²      Constitutional: 67-year-old female, appears anxious well developed,  but oriented x3  HENT: Normocephalic, Atraumatic, Nares:Clear, Oropharynx: moist, well hydrated, posterior pharynx:clear   Eyes: PERRL, EOMI, Conjunctiva normal, No discharge.  No nystagmus;  Neck: Normal range of motion, No tenderness, Supple, No stridor.   Lymphatic: No lymphadenopathy noted.   Cardiovascular: Regular rate and rhythm without mumurs, gallups, rubs   Thorax & Lungs: Normal Equal breath sounds, No respiratory distress, No wheezing, no stridor, no rales. No chest tenderness.   Abdomen: Soft, nontender, nondistended, no organomegaly, positive bowel " sounds normal in quality. No guarding or rebound.  Skin: Good skin turgor, pink, warm, dry. No rashes, petechiae, purpura. Normal capillary refill.   Back: No tenderness, No CVA tenderness.   Extremities: Intact distal pulses, No edema, No tenderness, No cyanosis,  Vascular: Pulses are 2+, symmetric in the upper and lower extremities.  Musculoskeletal: Good range of motion in all major joints. No tenderness to palpation or major deformities noted.   Neurologic: Alert & oriented x 3,  No gross focal deficits noted.   Psychiatric: Affect normal, Judgment normal, Mood normal.       DIAGNOSTIC STUDIES / PROCEDURES  EKG  I have independently interpreted this EKG  Rate 70, rhythm sinus, left axis deviation, left anterior fascicular block, poor R wave progression anteriorly, no acute STEMI, twelve-lead EKG, no acute change compared to previous tracing;    LABS  CBC shows white count 3.2, 61% neutrophils, 26% lymphocytes, 9% monocytes, hemoglobin 13.5 hematocrit 40.3; CMP within the limits; troponin 7; ; coags normal;    RADIOLOGY  Radiologist interpretation:   CT-HEAD W/O   Final Result      No evidence of acute intracranial process.         DX-CHEST-PORTABLE (1 VIEW)   Final Result      No evidence of acute cardiopulmonary process.            COURSE & MEDICAL DECISION MAKING        INITIAL ASSESSMENT, COURSE AND PLAN    1.  Monitor  2.  Saline lock  3.  Aspirin  4.  Ativan 0.5 mg IV      Care Narrative: At this time, the patient presents for evaluation of chest pain and dizziness.  The patient has somewhat atypical chest pain.  However, the patient had previous atypical chest pain prior to a stress test in 2019 which resulted in abnormal findings.  This led to a coronary artery angiogram.  She has not received any stress testing over the last 2 years.  Patient's dizziness appears to be vertiginous in nature.  I do not see any evidence of vertebrobasilar insufficiency or stroke and this appears to be more peripheral  vertigo.  Patient does have a heart score of 5.  Therefore, I spoke with the hospitalist on-call.  Patient will be admitted for further monitoring, treatment, and care.      ADDITIONAL PROBLEM LIST  1.  Hypertension  2.  Dyslipidemia    DISPOSITION AND DISCUSSIONS  I have discussed management of the patient with the following physicians and [facility]'s: Hospitalist on-call      FINAL DIAGNOSIS  1. Chest pain, unspecified type    2. Vertigo             Electronically signed by: Guy G Gansert, M.D., 2/3/2023 9:31 AM

## 2023-02-03 NOTE — ED NOTES
Med rec completed per pt   Allergies reviewed    Pt started a 7 day course of Doxycycline on 1/31/2023

## 2023-02-03 NOTE — ASSESSMENT & PLAN NOTE
- Pt was on doxycycline as an outpatient started on 1/31   - No white count or infiltrate seen on CXR, clear breath sounds with good air movement  - Given the myriad of symptoms, will check a mini viral panel  - PRN albuterol  - RT consult  - Has history of asthma, but is moving air well - if viral panel negative, consider prednisone for possible cough variant asthma exacerbation

## 2023-02-03 NOTE — ED NOTES
"Pt states she has a history of a \"coronary blockage.\" Pt complains of chest tightness, fatigue, and dizziness.   "

## 2023-02-04 ENCOUNTER — APPOINTMENT (OUTPATIENT)
Dept: RADIOLOGY | Facility: MEDICAL CENTER | Age: 68
End: 2023-02-04
Attending: FAMILY MEDICINE
Payer: MEDICARE

## 2023-02-04 VITALS
DIASTOLIC BLOOD PRESSURE: 56 MMHG | HEART RATE: 63 BPM | BODY MASS INDEX: 27.47 KG/M2 | OXYGEN SATURATION: 92 % | WEIGHT: 145.5 LBS | RESPIRATION RATE: 18 BRPM | SYSTOLIC BLOOD PRESSURE: 108 MMHG | TEMPERATURE: 98.4 F | HEIGHT: 61 IN

## 2023-02-04 LAB
ANION GAP SERPL CALC-SCNC: 11 MMOL/L (ref 7–16)
BASOPHILS # BLD AUTO: 0.2 % (ref 0–1.8)
BASOPHILS # BLD: 0.01 K/UL (ref 0–0.12)
BUN SERPL-MCNC: 25 MG/DL (ref 8–22)
CALCIUM SERPL-MCNC: 9.1 MG/DL (ref 8.4–10.2)
CHLORIDE SERPL-SCNC: 105 MMOL/L (ref 96–112)
CHOLEST SERPL-MCNC: 167 MG/DL (ref 100–199)
CO2 SERPL-SCNC: 22 MMOL/L (ref 20–33)
CREAT SERPL-MCNC: 0.79 MG/DL (ref 0.5–1.4)
EOSINOPHIL # BLD AUTO: 0.04 K/UL (ref 0–0.51)
EOSINOPHIL NFR BLD: 0.9 % (ref 0–6.9)
ERYTHROCYTE [DISTWIDTH] IN BLOOD BY AUTOMATED COUNT: 43.5 FL (ref 35.9–50)
GFR SERPLBLD CREATININE-BSD FMLA CKD-EPI: 81 ML/MIN/1.73 M 2
GLUCOSE SERPL-MCNC: 107 MG/DL (ref 65–99)
HCT VFR BLD AUTO: 37.4 % (ref 37–47)
HDLC SERPL-MCNC: 70 MG/DL
HGB BLD-MCNC: 12.4 G/DL (ref 12–16)
IMM GRANULOCYTES # BLD AUTO: 0.02 K/UL (ref 0–0.11)
IMM GRANULOCYTES NFR BLD AUTO: 0.5 % (ref 0–0.9)
LDLC SERPL CALC-MCNC: 81 MG/DL
LYMPHOCYTES # BLD AUTO: 0.91 K/UL (ref 1–4.8)
LYMPHOCYTES NFR BLD: 21.4 % (ref 22–41)
MCH RBC QN AUTO: 32 PG (ref 27–33)
MCHC RBC AUTO-ENTMCNC: 33.2 G/DL (ref 33.6–35)
MCV RBC AUTO: 96.6 FL (ref 81.4–97.8)
MONOCYTES # BLD AUTO: 0.38 K/UL (ref 0–0.85)
MONOCYTES NFR BLD AUTO: 8.9 % (ref 0–13.4)
NEUTROPHILS # BLD AUTO: 2.9 K/UL (ref 2–7.15)
NEUTROPHILS NFR BLD: 68.1 % (ref 44–72)
NRBC # BLD AUTO: 0 K/UL
NRBC BLD-RTO: 0 /100 WBC
PLATELET # BLD AUTO: 164 K/UL (ref 164–446)
PMV BLD AUTO: 9.8 FL (ref 9–12.9)
POTASSIUM SERPL-SCNC: 3.9 MMOL/L (ref 3.6–5.5)
RBC # BLD AUTO: 3.87 M/UL (ref 4.2–5.4)
SODIUM SERPL-SCNC: 138 MMOL/L (ref 135–145)
TRIGL SERPL-MCNC: 79 MG/DL (ref 0–149)
WBC # BLD AUTO: 4.3 K/UL (ref 4.8–10.8)

## 2023-02-04 PROCEDURE — 700102 HCHG RX REV CODE 250 W/ 637 OVERRIDE(OP): Performed by: FAMILY MEDICINE

## 2023-02-04 PROCEDURE — A9270 NON-COVERED ITEM OR SERVICE: HCPCS | Performed by: FAMILY MEDICINE

## 2023-02-04 PROCEDURE — 78452 HT MUSCLE IMAGE SPECT MULT: CPT

## 2023-02-04 PROCEDURE — 78452 HT MUSCLE IMAGE SPECT MULT: CPT | Mod: 26 | Performed by: INTERNAL MEDICINE

## 2023-02-04 PROCEDURE — 80061 LIPID PANEL: CPT

## 2023-02-04 PROCEDURE — 80048 BASIC METABOLIC PNL TOTAL CA: CPT

## 2023-02-04 PROCEDURE — 97162 PT EVAL MOD COMPLEX 30 MIN: CPT

## 2023-02-04 PROCEDURE — 99239 HOSP IP/OBS DSCHRG MGMT >30: CPT | Performed by: INTERNAL MEDICINE

## 2023-02-04 PROCEDURE — 700111 HCHG RX REV CODE 636 W/ 250 OVERRIDE (IP): Performed by: FAMILY MEDICINE

## 2023-02-04 PROCEDURE — 85025 COMPLETE CBC W/AUTO DIFF WBC: CPT

## 2023-02-04 PROCEDURE — 93018 CV STRESS TEST I&R ONLY: CPT | Performed by: INTERNAL MEDICINE

## 2023-02-04 PROCEDURE — 36415 COLL VENOUS BLD VENIPUNCTURE: CPT

## 2023-02-04 PROCEDURE — 97165 OT EVAL LOW COMPLEX 30 MIN: CPT

## 2023-02-04 PROCEDURE — G0378 HOSPITAL OBSERVATION PER HR: HCPCS

## 2023-02-04 RX ORDER — AMINOPHYLLINE 25 MG/ML
100 INJECTION, SOLUTION INTRAVENOUS
Status: DISCONTINUED | OUTPATIENT
Start: 2023-02-04 | End: 2023-02-04 | Stop reason: HOSPADM

## 2023-02-04 RX ORDER — REGADENOSON 0.08 MG/ML
0.4 INJECTION, SOLUTION INTRAVENOUS ONCE
Status: COMPLETED | OUTPATIENT
Start: 2023-02-04 | End: 2023-02-04

## 2023-02-04 RX ADMIN — PAROXETINE HYDROCHLORIDE 10 MG: 10 TABLET, FILM COATED ORAL at 05:06

## 2023-02-04 RX ADMIN — LOSARTAN POTASSIUM 25 MG: 25 TABLET, FILM COATED ORAL at 05:07

## 2023-02-04 RX ADMIN — REGADENOSON 0.4 MG: 0.08 INJECTION, SOLUTION INTRAVENOUS at 09:24

## 2023-02-04 RX ADMIN — VERAPAMIL HYDROCHLORIDE 240 MG: 240 TABLET, FILM COATED, EXTENDED RELEASE ORAL at 05:06

## 2023-02-04 RX ADMIN — FLUTICASONE PROPIONATE 100 MCG: 50 SPRAY, METERED NASAL at 06:00

## 2023-02-04 RX ADMIN — ASPIRIN 325 MG ORAL TABLET 325 MG: 325 PILL ORAL at 05:06

## 2023-02-04 RX ADMIN — CETIRIZINE HYDROCHLORIDE 10 MG: 10 TABLET, FILM COATED ORAL at 05:06

## 2023-02-04 RX ADMIN — OMEPRAZOLE 20 MG: 20 CAPSULE, DELAYED RELEASE ORAL at 05:06

## 2023-02-04 ASSESSMENT — COGNITIVE AND FUNCTIONAL STATUS - GENERAL
SUGGESTED CMS G CODE MODIFIER DAILY ACTIVITY: CH
DAILY ACTIVITIY SCORE: 24

## 2023-02-04 ASSESSMENT — GAIT ASSESSMENTS
GAIT LEVEL OF ASSIST: INDEPENDENT
DISTANCE (FEET): 100
DISTANCE (FEET): 100

## 2023-02-04 ASSESSMENT — FIBROSIS 4 INDEX: FIB4 SCORE: 2.24

## 2023-02-04 ASSESSMENT — ACTIVITIES OF DAILY LIVING (ADL): TOILETING: INDEPENDENT

## 2023-02-04 NOTE — DISCHARGE SUMMARY
"Discharge Summary    CHIEF COMPLAINT ON ADMISSION  Chief Complaint   Patient presents with    Chest Pain     \"Tightness\" x a couple of weeks      Dizziness     X a couple of weeks       Reason for Admission  Chest Tightness, Dizziness     Admission Date  2/3/2023    CODE STATUS  Full Code    HPI & HOSPITAL COURSE  Per notes, \"67 y.o. female who presented 2/3/2023 with chest tightness. This is a female with a history of HLD, DDD, asthma,GERD, non obstructive CAD on [facility] in 2019, JOANIE, HTN, Rosado's esophagus and hx of breast cancer. She presented to hospital with chest tightness and dizziness x three weeks - the chest pain had been intermittent over the past three weeks, both night and day, describes as tightness, + associated cough for the same time period. She has had white and green sputum and the chest pain gets worse with coughing. She had just started a course of doxycycline on1/31. Previous cardiac workup included a [facility] 7/21 that demonstrated a nonobstructive 30% RCA lesion. The chest pain is substernal, no worse with food and not associated with diaphoresis, nausea or vomiting, and without radiation.      The dizziness started at the time, is worse with standing up and ambulation. Her dizziness is described as room spinning rather than lightheadedness and is worsened with positional changes - she does have an associated headache as well. She relates being diagnosed with vertigo in the past a few years ago, and it resolved on its own.      On presentation to the ER, pt had mildly elevated BP at 155/79 and there were no notable lab abnormalities - initial troponin was negative, CXR was clear, EKG showed chronic left axis deviation with LAFB and CT brain done for vertigo symptoms was negative. She has no focal weakness or tingling.\"    Patient was admitted for symptoms of vertigo and chest pressure.  Initial work-up for chest pain was negative.  She underwent a stress test on the morning of 2/4, which was " negative for acute findings.  Symptoms had resolved by the time of discharge.  I did recommend she follow-up closely with PCP and I think would be prudent to follow-up with cardiology for further work-up and states she may have an undiagnosed arrhythmia or other condition contributing to her symptoms.    Therefore, she is discharged in good and stable condition to home with close outpatient follow-up.    The patient recovered much more quickly than anticipated on admission.    Discharge Date  2/4/2023    FOLLOW UP ITEMS POST DISCHARGE  FU with PCP   FU with cardiology     DISCHARGE DIAGNOSES  Principal Problem:    Vertigo POA: Yes  Active Problems:    Essential hypertension, benign POA: Yes    Chest tightness POA: Unknown    Gastroesophageal reflux disease POA: Yes    Hyperlipidemia POA: Yes    Rosado's esophagus without dysplasia POA: Yes      Overview: Found on EGD 12/2022    Cough POA: Unknown  Resolved Problems:    * No resolved hospital problems. *      FOLLOW UP  No future appointments.  No follow-up provider specified.    MEDICATIONS ON DISCHARGE     Medication List        CONTINUE taking these medications        Instructions   aspirin 325 MG Tabs  Commonly known as: ASA   Take 325-650 mg by mouth every 6 hours as needed. Indications: Pain  Dose: 325-650 mg     atorvastatin 40 MG Tabs  Commonly known as: LIPITOR   Take 1 Tablet by mouth every day.  Dose: 40 mg     Flovent HFA 44 MCG/ACT Aero  Generic drug: fluticasone   Inhale 2 Puffs 2 times a day as needed (Shortness of breath).  Dose: 2 Puff     ibuprofen 600 MG Tabs  Commonly known as: MOTRIN   Take 600 mg by mouth every 6 hours as needed. Indications: Pain  Dose: 600 mg     losartan 25 MG Tabs  Commonly known as: COZAAR   Take 1 Tablet by mouth every day.  Dose: 25 mg     omeprazole 20 MG delayed-release capsule  Commonly known as: PRILOSEC   Take 20 mg by mouth every day.  Dose: 20 mg     PARoxetine 10 MG Tabs  Commonly known as: PAXIL   Take 10 mg by  mouth every day.  Dose: 10 mg     verapamil  MG Tbcr  Commonly known as: CALAN-SR   TAKE 1 TABLET BY MOUTH EVERY DAY  Dose: 240 mg     VITAMIN B-12 PO   Take 1 Tablet by mouth every day.  Dose: 1 Tablet            STOP taking these medications      doxycycline 100 MG Tabs  Commonly known as: VIBRAMYCIN              Allergies  Allergies   Allergen Reactions    Sulfa Drugs Hives       DIET  Orders Placed This Encounter   Procedures    Diet NPO Restrict to: Strict     Standing Status:   Standing     Number of Occurrences:   8     Order Specific Question:   Diet NPO Restrict to:     Answer:   Strict [1]       ACTIVITY  As tolerated.  Weight bearing as tolerated    CONSULTATIONS  None    PROCEDURES  None    LABORATORY  Lab Results   Component Value Date    SODIUM 138 02/04/2023    POTASSIUM 3.9 02/04/2023    CHLORIDE 105 02/04/2023    CO2 22 02/04/2023    GLUCOSE 107 (H) 02/04/2023    BUN 25 (H) 02/04/2023    CREATININE 0.79 02/04/2023        Lab Results   Component Value Date    WBC 4.3 (L) 02/04/2023    HEMOGLOBIN 12.4 02/04/2023    HEMATOCRIT 37.4 02/04/2023    PLATELETCT 164 02/04/2023        Total time of the discharge process exceeds 45 minutes.

## 2023-02-04 NOTE — CARE PLAN
The patient is Stable - Low risk of patient condition declining or worsening    Shift Goals  Clinical Goals: safety and comfort, NPO  Patient Goals: rest    Progress made toward(s) clinical / shift goals:  NPO after MN, not on physical or respiratory distress.    Patient is not progressing towards the following goals:

## 2023-02-04 NOTE — CARE PLAN
Problem: Pain - Standard  Goal: Alleviation of pain or a reduction in pain to the patient’s comfort goal  Outcome: Progressing     Problem: Optimal Care of the Stroke Patient  Goal: Optimal emergency care for the stroke patient  Outcome: Progressing  Goal: Optimal acute care for the stroke patient  Outcome: Progressing     Problem: Knowledge Deficit - Stroke Education  Goal: Patient's knowledge of stroke and risk factors will improve  Outcome: Progressing     Problem: Psychosocial - Patient Condition  Goal: Patient's ability to verbalize feelings about condition will improve  Outcome: Progressing   The patient is Watcher - Medium risk of patient condition declining or worsening    Shift Goals  Clinical Goals: safety and comfort, NPO  Patient Goals: rest    Progress made toward(s) clinical / shift goals:  ambulating, participating with daily tasks.

## 2023-02-04 NOTE — PROGRESS NOTES
Pt arrived to inpatient telemetry unit. VSS. Admission profile completed. Pt is alert, oriented, and in no acute distress, answering all questions appropriately. Pt reports epigastric-chest pain at 5/10, unchanged from ER admission. Neuro assessment showed no focal deficits. Lungs clear to auscultation. Telemetry monitor applied to pt and verified to be SR by monitor tech.     RN encouraged pt to use call light when needing assistance as pt is reporting dizziness/vertigo with fast movement and when changing positioning. RN discussed pt's NPO status starting at midnight. All questions answered. Safety precautions in place. Family at bedside.

## 2023-02-04 NOTE — PROGRESS NOTES
Telemetry Shift Summary    Rhythm SR/SB  HR Range 58-76  Ectopy rPAC,PVC  Measurements .18/.12/.44          Normal Values  Rhythm SR  HR Range    Measurements 0.12-0.20 / 0.06-0.10  / 0.30-0.52

## 2023-02-04 NOTE — THERAPY
Occupational Therapy   Initial Evaluation     Patient Name: Haley Hawthorne  Age:  67 y.o., Sex:  female  Medical Record #: [id number]  Today's Date: 2/4/2023          Assessment  Patient is 67 y.o. female with a diagnosis of vertigo. Presents A&Ox4, motivated for activity; family in room visiting. BUE strength,sensation,coordination,AROM - WFL. Vision-WFL. Demonstrates good balance in room/halls and on steps; no AD. C/o mild vertigo. Reviewed home safety, DME/AE during ADL's. No further acute OT needs.        Plan  Eval only.      DC Equipment Recommendations: None  Discharge Recommendations: Anticipate that the patient will have no further occupational therapy needs after discharge from the hospital     Subjective  Pleasant and cooperative.     Objective     02/04/23 1150   Prior Living Situation   Prior Services None   Housing / Facility 1 Story House   Steps Into Home 2   Steps In Home 0   Bathroom Set up Walk In Shower   Equipment Owned None   Lives with - Patient's Self Care Capacity Spouse   Prior Level of ADL Function   Self Feeding Independent   Grooming / Hygiene Independent   Bathing Independent   Dressing Independent   Toileting Independent   Prior Level of IADL Function   Medication Management Independent   Laundry Independent   Kitchen Mobility Independent   Finances Independent   Home Management Independent   Shopping Independent   Prior Level Of Mobility Independent Without Device in Home   Driving / Transportation Driving Independent   Occupation (Pre-Hospital Vocational) Retired Due To Age   Leisure Interests Hobbies   Vitals   O2 Delivery Device None - Room Air   Cognition    Cognition / Consciousness WDL   Level of Consciousness Alert   Passive ROM Upper Body   Passive ROM Upper Body WDL   Active ROM Upper Body   Active ROM Upper Body  WDL   Strength Upper Body   Upper Body Strength  WDL   Sensation Upper Body   Upper Extremity Sensation  WDL   Upper Body Muscle Tone   Upper Body Muscle  Tone  WDL   Coordination Upper Body   Coordination WDL   Balance Assessment   Sitting Balance (Static) Good   Sitting Balance (Dynamic) Good   Standing Balance (Static) Good   Standing Balance (Dynamic) Fair +   Weight Shift Sitting Good   Weight Shift Standing Good   Bed Mobility    Supine to Sit Independent   Sit to Supine Independent   Scooting Independent   ADL Assessment   Grooming Independent   Upper Body Dressing Independent   Lower Body Dressing Independent   Toileting Independent   How much help from another person does the patient currently need...   Putting on and taking off regular lower body clothing? 4   Bathing (including washing, rinsing, and drying)? 4   Toileting, which includes using a toilet, bedpan, or urinal? 4   Putting on and taking off regular upper body clothing? 4   Taking care of personal grooming such as brushing teeth? 4   Eating meals? 4   6 Clicks Daily Activity Score 24   mRS Prior to admission   Prior to admission mRS 1   Modified Rush (mRS)   Modified Rush Score 1   Functional Mobility   Sit to Stand Independent   Bed, Chair, Wheelchair Transfer Supervised   Toilet Transfers Supervised   Transfer Method Stand Step   Distance (Feet) 100   # of Times Distance was Traveled 2   Comments stairs with spv   Visual Perception   Visual Perception  WDL   Activity Tolerance   Sitting Edge of Bed 12   Standing 10   Education Group   Education Provided Home Safety   Role of Occupational Therapist Patient Response Patient;Family;Acceptance;Explanation;Verbal Demonstration   Home Safety Patient Response Patient;Family;Acceptance;Explanation;Verbal Demonstration   Anticipated Discharge Equipment and Recommendations   DC Equipment Recommendations None   Discharge Recommendations Anticipate that the patient will have no further occupational therapy needs after discharge from the hospital

## 2023-02-04 NOTE — DIETARY
Nutrition services: Day 0 of admit.  Haley Hawthorne is a 67 y.o. female with admitting DX of Vertigo [R42]    Consult received for MST 2: 2-13 lb wt loss x 1 month, poor PO intake PTA.    Assessment:  Height:  (refer to flowsheets)  Weight: 66 kg (145 lb 8.1 oz)  Body mass index is 27.49 kg/m²., BMI classification: Overweight  Diet/Intake: NPO    Evaluation:   Pt admitted w/ x2 weeks of chest tightness/heaviness and dizziness. Dx list: vertigo, Rosado's esophagus w/o dysplasia, chest tightness, cough, HTN, GERD, HLD.  SLP evaluation pending.  Labs: glu 107, BUN 25  MAR: senna, prilosec  +BM: none noted  Wt per chart hx stable at 137-139 lb over past year. Current wt is 145.5 lb    Malnutrition Risk: Wt stable w/ possible recent wt gain per chart review    Recommendations/Plan:  Diet per SLP pending eval.   Once PO diet resumed, encourage intake of meals >50-75%.  Document intake of all PO as % taken in ADL's to provide interdisciplinary communication across all shifts.   Monitor weight.  Nutrition rep will continue to see patient for ongoing meal and snack preferences.   Monitor for adequate PO intake once diet is resumed.

## 2023-02-04 NOTE — THERAPY
Speech Language Therapy Contact Note    Patient Name: Haley Hawthorne  Age:  67 y.o., Sex:  female  Medical Record #: [id number]  Today's Date: 2/4/2023 02/04/23 0831   Treatment Variance   Reason For Missed Therapy Medical - Patient  in Procedure  (off the floor for stress test; Per RN, no needs)   Interdisciplinary Plan of Care Collaboration   IDT Collaboration with  Nursing   Collaboration Comments SLP orders received for clinical swallow evaluation. Per RN, pt is NPO and off the floor for a stress test. Per RN, pt had been consuming regular solids and thin liquids without overt difficulty prior to NPO status. Anticipate no swallow needs, though RN to contact SLP team should difficulties arise for a formal evaluation.

## 2023-02-04 NOTE — PROGRESS NOTES
Pt presents to NM for TM stress test. Pt endorses dizziness at this time. Also only in non-skid socks. Discussed chemical stress test with pt vs TM. Pt agreeable to chemical stress test. Pt prepped appropriately. Orders placed.

## 2023-02-04 NOTE — THERAPY
Physical Therapy   Initial Evaluation     Patient Name: Haley Hawthorne  Age:  67 y.o., Sex:  female  Medical Record #: [id number]  Today's Date: 2/4/2023     Precautions  Precautions: (P) Fall Risk  Comments: (P) mild    Assessment  Patient is 67 y.o. female with a diagnosis of vertigo Pt lives at home with  and is active.Pt is safe with transfers and ambulation.She was positive for R post canal BPPV and was shown epley maneuver   Plan  DC Equipment Recommendations: (P) None  Discharge Recommendations: (P) Anticipate that the patient will have no further physical therapy needs after discharge from the hospital        Objective       02/04/23 1300   Charge Group   PT Evaluation PT Evaluation Mod   Total Time Spent   PT Total Time Yes   PT Evaluation Time Spent (Mins) 30   Initial Contact Note    Initial Contact Note Order Received and Verified, Physical Therapy Evaluation in Progress with Full Report to Follow.   Precautions   Precautions Fall Risk   Comments mild   Prior Living Situation   Prior Services None   Housing / Facility 1 Story House   Steps Into Home 2   Steps In Home 0   Equipment Owned None   Lives with - Patient's Self Care Capacity Spouse   Prior Level of Functional Mobility   Bed Mobility Independent   Transfer Status Independent   Ambulation Independent   Distance Ambulation (Feet)   (community amb)   Coordination Lower Body    Coordination Lower Body  WDL   Balance Assessment   Sitting Balance (Static) Good   Sitting Balance (Dynamic) Good   Standing Balance (Static) Good   Standing Balance (Dynamic) Fair +   Weight Shift Sitting Good   Weight Shift Standing Good   Bed Mobility    Supine to Sit Independent   Sit to Supine Independent   Scooting Independent   Gait Analysis   Gait Level Of Assist Independent   Assistive Device None   Distance (Feet) 100   Weight Bearing Status full   Functional Mobility   Sit to Stand Independent   Bed, Chair, Wheelchair Transfer Independent   Patient  / Family Goals    Patient / Family Goal #1 Home   Anticipated Discharge Equipment and Recommendations   DC Equipment Recommendations None   Discharge Recommendations Anticipate that the patient will have no further physical therapy needs after discharge from the hospital   Interdisciplinary Plan of Care Collaboration   IDT Collaboration with  Nursing   Session Information   Date / Session Number  2/4   Priority 0            numerical 0-10

## 2023-02-10 ENCOUNTER — TELEPHONE (OUTPATIENT)
Dept: HEALTH INFORMATION MANAGEMENT | Facility: OTHER | Age: 68
End: 2023-02-10
Payer: MEDICARE

## 2023-02-13 ENCOUNTER — HOSPITAL ENCOUNTER (OUTPATIENT)
Dept: LAB | Facility: MEDICAL CENTER | Age: 68
End: 2023-02-13
Attending: FAMILY MEDICINE
Payer: MEDICARE

## 2023-02-13 LAB
25(OH)D3 SERPL-MCNC: 24 NG/ML (ref 30–100)
ALBUMIN SERPL BCP-MCNC: 4.8 G/DL (ref 3.2–4.9)
ALBUMIN/GLOB SERPL: 2 G/DL
ALP SERPL-CCNC: 102 U/L (ref 30–99)
ALT SERPL-CCNC: 22 U/L (ref 2–50)
ANION GAP SERPL CALC-SCNC: 13 MMOL/L (ref 7–16)
AST SERPL-CCNC: 28 U/L (ref 12–45)
BASOPHILS # BLD AUTO: 0.5 % (ref 0–1.8)
BASOPHILS # BLD: 0.02 K/UL (ref 0–0.12)
BILIRUB SERPL-MCNC: 0.7 MG/DL (ref 0.1–1.5)
BUN SERPL-MCNC: 15 MG/DL (ref 8–22)
CALCIUM ALBUM COR SERPL-MCNC: 9.5 MG/DL (ref 8.5–10.5)
CALCIUM SERPL-MCNC: 10.1 MG/DL (ref 8.5–10.5)
CHLORIDE SERPL-SCNC: 101 MMOL/L (ref 96–112)
CHOLEST SERPL-MCNC: 164 MG/DL (ref 100–199)
CO2 SERPL-SCNC: 25 MMOL/L (ref 20–33)
CREAT SERPL-MCNC: 0.8 MG/DL (ref 0.5–1.4)
EOSINOPHIL # BLD AUTO: 0.02 K/UL (ref 0–0.51)
EOSINOPHIL NFR BLD: 0.5 % (ref 0–6.9)
ERYTHROCYTE [DISTWIDTH] IN BLOOD BY AUTOMATED COUNT: 41.7 FL (ref 35.9–50)
GFR SERPLBLD CREATININE-BSD FMLA CKD-EPI: 80 ML/MIN/1.73 M 2
GLOBULIN SER CALC-MCNC: 2.4 G/DL (ref 1.9–3.5)
GLUCOSE SERPL-MCNC: 84 MG/DL (ref 65–99)
HCT VFR BLD AUTO: 42.6 % (ref 37–47)
HDLC SERPL-MCNC: 73 MG/DL
HGB BLD-MCNC: 14.1 G/DL (ref 12–16)
IMM GRANULOCYTES # BLD AUTO: 0.01 K/UL (ref 0–0.11)
IMM GRANULOCYTES NFR BLD AUTO: 0.2 % (ref 0–0.9)
LDLC SERPL CALC-MCNC: 75 MG/DL
LYMPHOCYTES # BLD AUTO: 0.86 K/UL (ref 1–4.8)
LYMPHOCYTES NFR BLD: 19.8 % (ref 22–41)
MCH RBC QN AUTO: 31.5 PG (ref 27–33)
MCHC RBC AUTO-ENTMCNC: 33.1 G/DL (ref 33.6–35)
MCV RBC AUTO: 95.1 FL (ref 81.4–97.8)
MONOCYTES # BLD AUTO: 0.31 K/UL (ref 0–0.85)
MONOCYTES NFR BLD AUTO: 7.1 % (ref 0–13.4)
NEUTROPHILS # BLD AUTO: 3.13 K/UL (ref 2–7.15)
NEUTROPHILS NFR BLD: 71.9 % (ref 44–72)
NRBC # BLD AUTO: 0 K/UL
NRBC BLD-RTO: 0 /100 WBC
PLATELET # BLD AUTO: 228 K/UL (ref 164–446)
PMV BLD AUTO: 10.7 FL (ref 9–12.9)
POTASSIUM SERPL-SCNC: 3.6 MMOL/L (ref 3.6–5.5)
PROT SERPL-MCNC: 7.2 G/DL (ref 6–8.2)
RBC # BLD AUTO: 4.48 M/UL (ref 4.2–5.4)
SODIUM SERPL-SCNC: 139 MMOL/L (ref 135–145)
T3FREE SERPL-MCNC: 2.94 PG/ML (ref 2–4.4)
T4 FREE SERPL-MCNC: 1.19 NG/DL (ref 0.93–1.7)
TRIGL SERPL-MCNC: 80 MG/DL (ref 0–149)
TSH SERPL DL<=0.005 MIU/L-ACNC: 2.12 UIU/ML (ref 0.38–5.33)
VIT B12 SERPL-MCNC: >4000 PG/ML (ref 211–911)
WBC # BLD AUTO: 4.4 K/UL (ref 4.8–10.8)

## 2023-02-13 PROCEDURE — 84439 ASSAY OF FREE THYROXINE: CPT

## 2023-02-13 PROCEDURE — 85025 COMPLETE CBC W/AUTO DIFF WBC: CPT

## 2023-02-13 PROCEDURE — 36415 COLL VENOUS BLD VENIPUNCTURE: CPT

## 2023-02-13 PROCEDURE — 83036 HEMOGLOBIN GLYCOSYLATED A1C: CPT

## 2023-02-13 PROCEDURE — 84481 FREE ASSAY (FT-3): CPT

## 2023-02-13 PROCEDURE — 84443 ASSAY THYROID STIM HORMONE: CPT

## 2023-02-13 PROCEDURE — 80061 LIPID PANEL: CPT

## 2023-02-13 PROCEDURE — 82306 VITAMIN D 25 HYDROXY: CPT

## 2023-02-13 PROCEDURE — 80053 COMPREHEN METABOLIC PANEL: CPT

## 2023-02-13 PROCEDURE — 82607 VITAMIN B-12: CPT

## 2023-02-15 LAB
EST. AVERAGE GLUCOSE BLD GHB EST-MCNC: 103 MG/DL
HBA1C MFR BLD: 5.2 % (ref 4–5.6)

## 2023-02-28 ENCOUNTER — HOSPITAL ENCOUNTER (OUTPATIENT)
Dept: RADIOLOGY | Facility: MEDICAL CENTER | Age: 68
End: 2023-02-28
Attending: FAMILY MEDICINE
Payer: MEDICARE

## 2023-02-28 DIAGNOSIS — Z12.31 VISIT FOR SCREENING MAMMOGRAM: ICD-10-CM

## 2023-02-28 PROCEDURE — 77063 BREAST TOMOSYNTHESIS BI: CPT

## 2023-05-18 DIAGNOSIS — I20.0 UNSTABLE ANGINA PECTORIS (HCC): ICD-10-CM

## 2023-05-22 ENCOUNTER — TELEPHONE (OUTPATIENT)
Dept: CARDIOLOGY | Facility: MEDICAL CENTER | Age: 68
End: 2023-05-22
Payer: MEDICARE

## 2023-05-22 NOTE — TELEPHONE ENCOUNTER
Per patients request, patient is scheduled on 6-16-23 for a C w/poss with . NO meds to stop and patient to check in at 6:00 for an 7:30 procedure. H&P was done on 5-17-23 by  and is scanned into . Pre admit to call patient.

## 2023-05-23 ENCOUNTER — APPOINTMENT (OUTPATIENT)
Dept: ADMISSIONS | Facility: MEDICAL CENTER | Age: 68
End: 2023-05-23
Attending: INTERNAL MEDICINE
Payer: MEDICARE

## 2023-05-26 ENCOUNTER — PRE-ADMISSION TESTING (OUTPATIENT)
Dept: ADMISSIONS | Facility: MEDICAL CENTER | Age: 68
End: 2023-05-26
Attending: INTERNAL MEDICINE
Payer: MEDICARE

## 2023-05-26 RX ORDER — PAROXETINE HYDROCHLORIDE 40 MG/1
40 TABLET, FILM COATED ORAL EVERY MORNING
COMMUNITY

## 2023-06-13 ENCOUNTER — PRE-ADMISSION TESTING (OUTPATIENT)
Dept: ADMISSIONS | Facility: MEDICAL CENTER | Age: 68
End: 2023-06-13
Attending: INTERNAL MEDICINE
Payer: MEDICARE

## 2023-06-13 DIAGNOSIS — Z01.812 PRE-OPERATIVE LABORATORY EXAMINATION: ICD-10-CM

## 2023-06-13 DIAGNOSIS — Z01.810 PRE-OPERATIVE CARDIOVASCULAR EXAMINATION: ICD-10-CM

## 2023-06-13 LAB
ALBUMIN SERPL BCP-MCNC: 4.9 G/DL (ref 3.2–4.9)
ALBUMIN/GLOB SERPL: 2.5 G/DL
ALP SERPL-CCNC: 115 U/L (ref 30–99)
ALT SERPL-CCNC: 24 U/L (ref 2–50)
ANION GAP SERPL CALC-SCNC: 15 MMOL/L (ref 7–16)
APTT PPP: 26.2 SEC (ref 24.7–36)
AST SERPL-CCNC: 26 U/L (ref 12–45)
BILIRUB SERPL-MCNC: 0.6 MG/DL (ref 0.1–1.5)
BUN SERPL-MCNC: 14 MG/DL (ref 8–22)
CALCIUM ALBUM COR SERPL-MCNC: 9.4 MG/DL (ref 8.5–10.5)
CALCIUM SERPL-MCNC: 10.1 MG/DL (ref 8.5–10.5)
CHLORIDE SERPL-SCNC: 106 MMOL/L (ref 96–112)
CO2 SERPL-SCNC: 24 MMOL/L (ref 20–33)
CREAT SERPL-MCNC: 0.85 MG/DL (ref 0.5–1.4)
EKG IMPRESSION: NORMAL
ERYTHROCYTE [DISTWIDTH] IN BLOOD BY AUTOMATED COUNT: 41 FL (ref 35.9–50)
GFR SERPLBLD CREATININE-BSD FMLA CKD-EPI: 74 ML/MIN/1.73 M 2
GLOBULIN SER CALC-MCNC: 2 G/DL (ref 1.9–3.5)
GLUCOSE SERPL-MCNC: 96 MG/DL (ref 65–99)
HCT VFR BLD AUTO: 42.3 % (ref 37–47)
HGB BLD-MCNC: 14.3 G/DL (ref 12–16)
INR PPP: 1.07 (ref 0.87–1.13)
MCH RBC QN AUTO: 30.6 PG (ref 27–33)
MCHC RBC AUTO-ENTMCNC: 33.8 G/DL (ref 32.2–35.5)
MCV RBC AUTO: 90.4 FL (ref 81.4–97.8)
PLATELET # BLD AUTO: 189 K/UL (ref 164–446)
PMV BLD AUTO: 10.7 FL (ref 9–12.9)
POTASSIUM SERPL-SCNC: 3.8 MMOL/L (ref 3.6–5.5)
PROT SERPL-MCNC: 6.9 G/DL (ref 6–8.2)
PROTHROMBIN TIME: 13.8 SEC (ref 12–14.6)
RBC # BLD AUTO: 4.68 M/UL (ref 4.2–5.4)
SODIUM SERPL-SCNC: 145 MMOL/L (ref 135–145)
WBC # BLD AUTO: 3.3 K/UL (ref 4.8–10.8)

## 2023-06-13 PROCEDURE — 80053 COMPREHEN METABOLIC PANEL: CPT

## 2023-06-13 PROCEDURE — 85730 THROMBOPLASTIN TIME PARTIAL: CPT

## 2023-06-13 PROCEDURE — 85027 COMPLETE CBC AUTOMATED: CPT

## 2023-06-13 PROCEDURE — 93005 ELECTROCARDIOGRAM TRACING: CPT

## 2023-06-13 PROCEDURE — 93010 ELECTROCARDIOGRAM REPORT: CPT | Performed by: INTERNAL MEDICINE

## 2023-06-13 PROCEDURE — 85610 PROTHROMBIN TIME: CPT

## 2023-06-13 PROCEDURE — 36415 COLL VENOUS BLD VENIPUNCTURE: CPT

## 2023-06-16 ENCOUNTER — HOSPITAL ENCOUNTER (OUTPATIENT)
Facility: MEDICAL CENTER | Age: 68
End: 2023-06-16
Attending: INTERNAL MEDICINE | Admitting: INTERNAL MEDICINE
Payer: MEDICARE

## 2023-06-16 ENCOUNTER — APPOINTMENT (OUTPATIENT)
Dept: CARDIOLOGY | Facility: MEDICAL CENTER | Age: 68
End: 2023-06-16
Attending: INTERNAL MEDICINE
Payer: MEDICARE

## 2023-06-16 VITALS
BODY MASS INDEX: 23.77 KG/M2 | HEIGHT: 61 IN | OXYGEN SATURATION: 96 % | SYSTOLIC BLOOD PRESSURE: 133 MMHG | RESPIRATION RATE: 16 BRPM | TEMPERATURE: 97.6 F | HEART RATE: 50 BPM | WEIGHT: 125.88 LBS | DIASTOLIC BLOOD PRESSURE: 60 MMHG

## 2023-06-16 DIAGNOSIS — I20.0 UNSTABLE ANGINA PECTORIS (HCC): ICD-10-CM

## 2023-06-16 LAB — EKG IMPRESSION: NORMAL

## 2023-06-16 PROCEDURE — A9270 NON-COVERED ITEM OR SERVICE: HCPCS

## 2023-06-16 PROCEDURE — 700117 HCHG RX CONTRAST REV CODE 255: Performed by: INTERNAL MEDICINE

## 2023-06-16 PROCEDURE — 93010 ELECTROCARDIOGRAM REPORT: CPT | Performed by: INTERNAL MEDICINE

## 2023-06-16 PROCEDURE — 93458 L HRT ARTERY/VENTRICLE ANGIO: CPT | Mod: 26 | Performed by: INTERNAL MEDICINE

## 2023-06-16 PROCEDURE — 700102 HCHG RX REV CODE 250 W/ 637 OVERRIDE(OP)

## 2023-06-16 PROCEDURE — 700101 HCHG RX REV CODE 250

## 2023-06-16 PROCEDURE — 160035 HCHG PACU - 1ST 60 MINS PHASE I

## 2023-06-16 PROCEDURE — 99153 MOD SED SAME PHYS/QHP EA: CPT

## 2023-06-16 PROCEDURE — 160046 HCHG PACU - 1ST 60 MINS PHASE II

## 2023-06-16 PROCEDURE — 700105 HCHG RX REV CODE 258: Performed by: INTERNAL MEDICINE

## 2023-06-16 PROCEDURE — 160002 HCHG RECOVERY MINUTES (STAT)

## 2023-06-16 PROCEDURE — 99152 MOD SED SAME PHYS/QHP 5/>YRS: CPT | Performed by: INTERNAL MEDICINE

## 2023-06-16 PROCEDURE — 700111 HCHG RX REV CODE 636 W/ 250 OVERRIDE (IP)

## 2023-06-16 PROCEDURE — 93005 ELECTROCARDIOGRAM TRACING: CPT | Performed by: NURSE PRACTITIONER

## 2023-06-16 RX ORDER — ASPIRIN 81 MG/1
TABLET, CHEWABLE ORAL
Status: COMPLETED
Start: 2023-06-16 | End: 2023-06-16

## 2023-06-16 RX ORDER — LIDOCAINE HYDROCHLORIDE 20 MG/ML
INJECTION, SOLUTION INFILTRATION; PERINEURAL
Status: COMPLETED
Start: 2023-06-16 | End: 2023-06-16

## 2023-06-16 RX ORDER — MIDAZOLAM HYDROCHLORIDE 1 MG/ML
INJECTION INTRAMUSCULAR; INTRAVENOUS
Status: COMPLETED
Start: 2023-06-16 | End: 2023-06-16

## 2023-06-16 RX ORDER — HEPARIN SODIUM 1000 [USP'U]/ML
INJECTION, SOLUTION INTRAVENOUS; SUBCUTANEOUS
Status: COMPLETED
Start: 2023-06-16 | End: 2023-06-16

## 2023-06-16 RX ORDER — HEPARIN SODIUM 200 [USP'U]/100ML
INJECTION, SOLUTION INTRAVENOUS
Status: COMPLETED
Start: 2023-06-16 | End: 2023-06-16

## 2023-06-16 RX ORDER — VERAPAMIL HYDROCHLORIDE 2.5 MG/ML
INJECTION, SOLUTION INTRAVENOUS
Status: COMPLETED
Start: 2023-06-16 | End: 2023-06-16

## 2023-06-16 RX ORDER — SODIUM CHLORIDE 9 MG/ML
1000 INJECTION, SOLUTION INTRAVENOUS ONCE
Status: COMPLETED | OUTPATIENT
Start: 2023-06-16 | End: 2023-06-16

## 2023-06-16 RX ADMIN — LIDOCAINE HYDROCHLORIDE: 20 INJECTION, SOLUTION INFILTRATION; PERINEURAL at 08:06

## 2023-06-16 RX ADMIN — HEPARIN SODIUM: 1000 INJECTION, SOLUTION INTRAVENOUS; SUBCUTANEOUS at 08:06

## 2023-06-16 RX ADMIN — HEPARIN SODIUM 2000 UNITS: 200 INJECTION, SOLUTION INTRAVENOUS at 08:06

## 2023-06-16 RX ADMIN — VERAPAMIL HYDROCHLORIDE 5 MG: 2.5 INJECTION, SOLUTION INTRAVENOUS at 08:06

## 2023-06-16 RX ADMIN — MIDAZOLAM HYDROCHLORIDE 2 MG: 2 INJECTION, SOLUTION INTRAMUSCULAR; INTRAVENOUS at 08:16

## 2023-06-16 RX ADMIN — SODIUM CHLORIDE 1000 ML: 9 INJECTION, SOLUTION INTRAVENOUS at 07:02

## 2023-06-16 RX ADMIN — ASPIRIN 81 MG 324 MG: 81 TABLET ORAL at 08:07

## 2023-06-16 RX ADMIN — NITROGLYCERIN 10 ML: 20 INJECTION INTRAVENOUS at 08:06

## 2023-06-16 RX ADMIN — FENTANYL CITRATE 100 MCG: 50 INJECTION, SOLUTION INTRAMUSCULAR; INTRAVENOUS at 08:22

## 2023-06-16 RX ADMIN — IOHEXOL 34 ML: 350 INJECTION, SOLUTION INTRAVENOUS at 08:28

## 2023-06-16 ASSESSMENT — FIBROSIS 4 INDEX: FIB4 SCORE: 1.94

## 2023-06-16 NOTE — DISCHARGE INSTRUCTIONS
What to Expect Post Anesthesia    Rest and take it easy for the first 24 hours.  A responsible adult is recommended to remain with you during that time.  It is normal to feel sleepy.  We encourage you to not do anything that requires balance, judgment or coordination.    FOR 24 HOURS DO NOT:  Drive, operate machinery or run household appliances.  Drink beer or alcoholic beverages.  Make important decisions or sign legal documents.    To avoid nausea, slowly advance diet as tolerated, avoiding spicy or greasy foods for the first day.  Add more substantial food to your diet according to your provider's instructions.  Babies can be fed formula or breast milk as soon as they are hungry.  INCREASE FLUIDS AND FIBER TO AVOID CONSTIPATION.    MILD FLU-LIKE SYMPTOMS ARE NORMAL.  YOU MAY EXPERIENCE GENERALIZED MUSCLE ACHES, THROAT IRRITATION, HEADACHE AND/OR SOME NAUSEA.  If any questions arise, call your provider.  If your provider is not available, please feel free to call the [facility] at [phone].    MEDICATIONS: Resume taking daily medication.  Take prescribed pain medication with food.  If no medication is prescribed, you may take non-aspirin pain medication if needed.  PAIN MEDICATION CAN BE VERY CONSTIPATING.  Take a stool softener or laxative such as senokot, pericolace, or milk of magnesia if needed.    Diet    Resume your normal diet as tolerated.  A diet low in cholesterol, fat, and sodium is recommended for good health.       Discharge Instructions:  POST ANGIOGRAM  General Care Instructions  Maintain a bandage over the incision site for 24 hours.  It's normal to find a small bruise or dime-sized lump at the insertion site. This should disappear within a few weeks.  Do not apply lotions or powders to the site.  Do not immerse the catheter insertion site in water (bathtub/swimming) for five days. It is ok to shower 24 hours after the procedure.  You may resume your normal diet immediately; on the day  of your procedure, drink 6-10 glasses of water to help flush the contrast liquid out of your system.  If the doctor inserted the catheter in your arm:  For 48 hours, DO NOT lift anything heavier than 10 pounds (approximately a gallon of milk). DO NOT do any heavy pushing, pulling, or twisting.    Medications  If your current medications need to be changed, you will be provided with an updated list of your medications prior to discharge.  If you take warfarin (Coumadin), resume taking your usual dose the evening after the procedure.  DO NOT STOP taking prescribed blood thinning (anti-platelet) medications unless instructed by your cardiologist.  These medications include:  Aspirin, Clopidogrel (Plavix), Ticagrelor (Brilinta), or Prasugrel (Effient)   If you take one of the following anticoagulants, RESUME 24 HOURS after your procedure:  Apixiban (Eliquis), Rivaroxaban (Xarelto), Dabigatran (Pradaxa), Edoxaban (Savaysa)  If you take metformin (Glucophage), RESUME 48 HOURS after your procedure.    When to call your healthcare provider  Call your cardiologist right away at [phone] if you have any of the following:   Problems/Concerns taking any of your prescribed heart medicines.   The insertion site has increasing pain, swelling, redness, bleeding, or drainage.   Your arm or leg below where the insertion site changes color, is cool, or is numb.   You have chest pain or shortness of breath that does not go away with rest.   Your pulse feels irregular -- very slow (less than 60 beats/minute) or very fast (over 100 beats/minute).   You have dizziness, fainting, or you are very tired.   You are coughing up blood or yellow or green mucus.   You have chills or a fever over 101°F (38.3°C).    If there is bleeding at the catheter insertion site, apply pressure for 10 minutes.  If bleeding persists, call 911, and continue to hold pressure until advanced medical support arrives      DRESSING: Keep dressing and incision dry  and intact for 24 hours, may remove dressing and shower after * * *  on * * *, do not need to replace.  Do not submerge site in water for 7 days.     BOWEL FUNCTION:  Prescription pain medication may cause constipation. If you are having problems, use what you normally would or call your provider for suggestions. It also helps to stay regular by including fiber in your diet (for example: bran or fruits and vegetables) and drink plenty of liquids (water, juice, etc.).

## 2023-06-16 NOTE — PROCEDURES
Cardiac Catheterization Laboratory Procedure Note    DATE: 6/16/2023    : Gigi Gale MD    PROCEDURES PERFORMED:  Left heart catheterization  Coronary angiography  Moderate conscious sedation    INDICATIONS:  The patient is a 68-year-old woman referred for cardiac catheterization to evaluate exertional fatigue with an abnormal cardiac stress test.    CONSENT:  The complete alternatives, risks, and benefits of the procedure were explained to the patient.  Signed informed consent was obtained and placed in the chart prior to the procedure.  A timeout was performed prior to beginning the procedure.    MEDICATIONS:  Lidocaine  Fentanyl  Midazolam  Nitroglycerin  Verapamil  Heparin    MODERATE CONSCIOUS SEDATION:  I personally supervised the administration of moderate conscious sedation by the nursing staff for 24 minutes.  Sedation start time: 8:05 AM  Sedation end time: 8:29 AM    CONTRAST: Omnipaque 34 cc    ACCESS: 6-Ukrainian Glidesheath in the right radial artery.    ESTIMATED BLOOD LOSS: 10 cc    COMPLICATIONS: None    DESCRIPTION OF PROCEDURE:  The patient was brought to the cardiac catheterization laboratory in the fasting state.  The skin over the right wrist was prepped and draped in the usual sterile fashion.  Lidocaine infiltration was used to anesthetize the tissue over the right radial artery.  Using the micropuncture technique, a 6-Ukrainian Glidesheath was inserted in the right radial artery, but could not be passed proximally due to significant tortuosity in the distal right radial artery.  Manual pressure was applied and access was obtained approximately 3 cm more proximally and the sheath was successfully inserted at that location.  A 5-Ukrainian Eric catheter was then advanced over a standard J-wire into the left ventricular cavity where it was gently aspirated, flushed, and then withdrawn across the aortic valve with sequential pressures measured.  This catheter was then used to engage the  ostium of the left main coronary artery and cineangiograms were obtained in multiple projections for complete evaluation of the left coronary system.  This catheter was then used to engage the ostium of the right coronary artery and cineangiograms were obtained in multiple projections for complete evaluation of the right coronary system.  At the completion of the case all wires, catheters, and sheaths were removed.  Two hemostatic bands were placed using the patent hemostasis technique.    HEMODYNAMICS:   Aortic pressure: 99/57 mmHg  Pre-A wave pressure: 8  No significant aortic gradient on pullback    CORONARY ANGIOGRAPHY:  The left main coronary artery has luminal disease and bifurcates into the left anterior descending and left circumflex coronary arteries.  The left anterior descending coronary artery is a large, transapical vessel with proximal 30% disease followed by a luminal irregularities.  It supplies a moderate first diagonal branch with no angiographically significant disease.  The left circumflex coronary artery is a large, nondominant vessel with mid 30% disease and otherwise luminal irregularities.  It supplies a small first obtuse marginal branch, a small second obtuse marginal branch, a moderate third obtuse marginal branch with luminal irregularities, a small fourth obtuse marginal branch, and a small posterolateral branch.  The right coronary artery is a moderate, dominant vessel with mid 30% disease and otherwise luminal irregularities.  It supplies a moderate posterior descending coronary and a small posterolateral branch with no angiographically significant disease.    IMPRESSION:  Mild nonobstructive coronary artery disease.  Normal left heart filling pressures.    RECOMMENDATIONS:  Recover in post procedure area.  Hemostatic band release per protocol.  Continue aggressive cardiac risk factor management.  Continue evaluation for non-epicardial atherosclerotic etiologies of the patient's  symptoms.    NOTIFICATION:  The patient's family was notified of the results of her cardiac catheterization.

## 2023-06-16 NOTE — OR NURSING
@1237 Pt arrived to Phase 2. Pt has no complaints of pain or nausea. Pt mobilized from gurney to chair. Vital signs stable. Dressing site clean dry and intact. Pts  brought back to discharge area. Discharge instructions discussed with patient at patients bedside.    @1250 PIV removed. PT able to dress self without assistance.     @1258 Pt discharged to home with all belongings.

## 2023-06-16 NOTE — OR NURSING
0842 Patient arrived to PACU from cath lab.  Report from anesthesia and RN.  Patient arouses to voice, denies discomfort.  Some numbness to right hand, TR band sites are clean, dry and soft.  Patient updated on plan of care.  0853  Fili called and updated on patient status.  0935 2 ml air removed from TR bands, no bleeding to site.  Patient denies pain, tolerating oral intake.  0950 2 ml air removed from TR band, no bleeding to site.   1000 2 ml air removed from TR band, no bleeding to site.   1005 Small spot of blood noted to distal TR band, no active bleeding noted.  1025 3 mls air removed from TR band, no bleeding to site.  Scant amount of blood noted to distal TR band site, 1 ml air replaced, no new bleeding noted.  1050 3 mls air removed from TR band, no bleeding to site.   1105 3 ml air removed from TR band, no bleeding to site.   1115 TR bands removed, no bleeding noted.  Gauze and tegaderm placed to site.  1135 Patient intermittently sleeping, heart rate sinus daly in 40's.  Patient denies discomfort or dizziness.  Sayeh updated.  1145 Patient ambulated, denies discomfort.  Back on monitor.  Potential a flutter noted.  APRN updated, EKG ordered.  1225 Dinorah at bedside to review EKG.  EKG reveals sinus daly.  OK for patient to discharge home.  1234 Patient transferred to Phase 2.  Report to Cyril.

## 2023-06-19 ENCOUNTER — HOSPITAL ENCOUNTER (OUTPATIENT)
Dept: LAB | Facility: MEDICAL CENTER | Age: 68
End: 2023-06-19
Attending: FAMILY MEDICINE
Payer: MEDICARE

## 2023-06-19 LAB
25(OH)D3 SERPL-MCNC: 32 NG/ML (ref 30–100)
BASOPHILS # BLD AUTO: 0.3 % (ref 0–1.8)
BASOPHILS # BLD: 0.01 K/UL (ref 0–0.12)
EOSINOPHIL # BLD AUTO: 0.03 K/UL (ref 0–0.51)
EOSINOPHIL NFR BLD: 0.9 % (ref 0–6.9)
ERYTHROCYTE [DISTWIDTH] IN BLOOD BY AUTOMATED COUNT: 41.1 FL (ref 35.9–50)
ERYTHROCYTE [SEDIMENTATION RATE] IN BLOOD BY WESTERGREN METHOD: 6 MM/HOUR (ref 0–25)
FERRITIN SERPL-MCNC: 215 NG/ML (ref 10–291)
HCT VFR BLD AUTO: 39.9 % (ref 37–47)
HGB BLD-MCNC: 13.4 G/DL (ref 12–16)
IMM GRANULOCYTES # BLD AUTO: 0.01 K/UL (ref 0–0.11)
IMM GRANULOCYTES NFR BLD AUTO: 0.3 % (ref 0–0.9)
LYMPHOCYTES # BLD AUTO: 0.79 K/UL (ref 1–4.8)
LYMPHOCYTES NFR BLD: 22.4 % (ref 22–41)
MCH RBC QN AUTO: 30.6 PG (ref 27–33)
MCHC RBC AUTO-ENTMCNC: 33.6 G/DL (ref 32.2–35.5)
MCV RBC AUTO: 91.1 FL (ref 81.4–97.8)
MONOCYTES # BLD AUTO: 0.3 K/UL (ref 0–0.85)
MONOCYTES NFR BLD AUTO: 8.5 % (ref 0–13.4)
NEUTROPHILS # BLD AUTO: 2.38 K/UL (ref 1.82–7.42)
NEUTROPHILS NFR BLD: 67.6 % (ref 44–72)
NRBC # BLD AUTO: 0 K/UL
NRBC BLD-RTO: 0 /100 WBC (ref 0–0.2)
PLATELET # BLD AUTO: 185 K/UL (ref 164–446)
PMV BLD AUTO: 11 FL (ref 9–12.9)
RBC # BLD AUTO: 4.38 M/UL (ref 4.2–5.4)
T3FREE SERPL-MCNC: 2.38 PG/ML (ref 2–4.4)
T4 FREE SERPL-MCNC: 1.15 NG/DL (ref 0.93–1.7)
TSH SERPL DL<=0.005 MIU/L-ACNC: 2.74 UIU/ML (ref 0.38–5.33)
VIT B12 SERPL-MCNC: 843 PG/ML (ref 211–911)
WBC # BLD AUTO: 3.5 K/UL (ref 4.8–10.8)

## 2023-06-19 PROCEDURE — 82728 ASSAY OF FERRITIN: CPT

## 2023-06-19 PROCEDURE — 85652 RBC SED RATE AUTOMATED: CPT

## 2023-06-19 PROCEDURE — 82306 VITAMIN D 25 HYDROXY: CPT

## 2023-06-19 PROCEDURE — 84439 ASSAY OF FREE THYROXINE: CPT

## 2023-06-19 PROCEDURE — 84443 ASSAY THYROID STIM HORMONE: CPT

## 2023-06-19 PROCEDURE — 82607 VITAMIN B-12: CPT

## 2023-06-19 PROCEDURE — 86800 THYROGLOBULIN ANTIBODY: CPT

## 2023-06-19 PROCEDURE — 86038 ANTINUCLEAR ANTIBODIES: CPT

## 2023-06-19 PROCEDURE — 85025 COMPLETE CBC W/AUTO DIFF WBC: CPT

## 2023-06-19 PROCEDURE — 83540 ASSAY OF IRON: CPT

## 2023-06-19 PROCEDURE — 84481 FREE ASSAY (FT-3): CPT

## 2023-06-19 PROCEDURE — 36415 COLL VENOUS BLD VENIPUNCTURE: CPT

## 2023-06-19 PROCEDURE — 83550 IRON BINDING TEST: CPT

## 2023-06-20 LAB
IRON SATN MFR SERPL: 30 % (ref 15–55)
IRON SERPL-MCNC: 88 UG/DL (ref 40–170)
TIBC SERPL-MCNC: 291 UG/DL (ref 250–450)
UIBC SERPL-MCNC: 203 UG/DL (ref 110–370)

## 2023-06-21 LAB
NUCLEAR IGG SER QL IA: NORMAL
THYROGLOB AB SERPL-ACNC: <0.9 IU/ML (ref 0–4)

## 2023-09-19 ENCOUNTER — HOSPITAL ENCOUNTER (OUTPATIENT)
Dept: RADIOLOGY | Facility: MEDICAL CENTER | Age: 68
End: 2023-09-19
Attending: FAMILY MEDICINE
Payer: MEDICARE

## 2023-09-19 DIAGNOSIS — M54.50 LOW BACK PAIN, UNSPECIFIED BACK PAIN LATERALITY, UNSPECIFIED CHRONICITY, UNSPECIFIED WHETHER SCIATICA PRESENT: ICD-10-CM

## 2023-09-19 PROCEDURE — 72100 X-RAY EXAM L-S SPINE 2/3 VWS: CPT

## 2023-09-26 ENCOUNTER — HOSPITAL ENCOUNTER (OUTPATIENT)
Dept: RADIOLOGY | Facility: MEDICAL CENTER | Age: 68
End: 2023-09-26
Attending: FAMILY MEDICINE
Payer: MEDICARE

## 2023-09-26 DIAGNOSIS — M85.80 OSTEOPENIA, UNSPECIFIED LOCATION: ICD-10-CM

## 2023-09-26 PROCEDURE — 77080 DXA BONE DENSITY AXIAL: CPT

## 2023-10-23 ENCOUNTER — TELEPHONE (OUTPATIENT)
Dept: HEALTH INFORMATION MANAGEMENT | Facility: OTHER | Age: 68
End: 2023-10-23
Payer: MEDICARE

## 2023-10-24 ENCOUNTER — OFFICE VISIT (OUTPATIENT)
Dept: URGENT CARE | Facility: PHYSICIAN GROUP | Age: 68
End: 2023-10-24
Payer: MEDICARE

## 2023-10-24 VITALS
HEIGHT: 61 IN | DIASTOLIC BLOOD PRESSURE: 78 MMHG | HEART RATE: 82 BPM | OXYGEN SATURATION: 96 % | TEMPERATURE: 97.4 F | SYSTOLIC BLOOD PRESSURE: 112 MMHG | RESPIRATION RATE: 16 BRPM | WEIGHT: 129 LBS | BODY MASS INDEX: 24.35 KG/M2
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
## 2023-10-24 DIAGNOSIS — M79.602 LEFT ARM PAIN: ICD-10-CM

## 2023-10-24 DIAGNOSIS — N64.4 BREAST PAIN: ICD-10-CM

## 2023-10-24 DIAGNOSIS — S09.93XA CHIN INJURY, INITIAL ENCOUNTER: ICD-10-CM

## 2023-10-24 PROCEDURE — 3074F SYST BP LT 130 MM HG: CPT | Performed by: FAMILY MEDICINE

## 2023-10-24 PROCEDURE — 3078F DIAST BP <80 MM HG: CPT | Performed by: FAMILY MEDICINE

## 2023-10-24 PROCEDURE — 99203 OFFICE O/P NEW LOW 30 MIN: CPT | Performed by: FAMILY MEDICINE

## 2023-10-24 ASSESSMENT — FIBROSIS 4 INDEX: FIB4 SCORE: 1.98

## 2023-10-24 NOTE — PROGRESS NOTES
"  Subjective:      68 y.o. female presents to urgent care for multiple injuries that she sustained on Sunday after a mechanical trip and fall.  She was moving her fish tank to his right area of her house when she accidentally tripped on the edge of the fireplace.  She fell and hit her chin on the edge of the fish tank, she also hit her left breast and left upper arm during the fall.  She did not lose consciousness or vomit with the fall.  She has had constant pain to all injured areas since then, is described both as dull and achy, currently rated 8/10.  She has been using ibuprofen with some relief in symptoms.    She denies any other questions or concerns at this time.    Current problem list, medication, and past medical/surgical history were reviewed in Epic.    ROS  See HPI     Objective:      /78   Pulse 82   Temp 36.3 °C (97.4 °F) (Temporal)   Resp 16   Ht 1.549 m (5' 1\")   Wt 58.5 kg (129 lb)   SpO2 96%   BMI 24.37 kg/m²     Physical Exam  Constitutional:       General: She is not in acute distress.     Appearance: She is not diaphoretic.   HENT:      Head:      Comments: Small bruise noted to the left side of her chin.  No open areas or discharge.  Cardiovascular:      Rate and Rhythm: Normal rate and regular rhythm.      Heart sounds: Normal heart sounds.   Pulmonary:      Effort: Pulmonary effort is normal. No respiratory distress.      Breath sounds: Normal breath sounds.   Chest:          Comments: Bruising noted to the marked area.  No underlying area of induration.  No open areas or discharge.  Musculoskeletal:      Comments: No discolorations or deformities noted to inspection of the left upper extremity.  Active ROM of left shoulder and elbow remain.   Neurological:      Mental Status: She is alert.   Psychiatric:         Mood and Affect: Affect normal.         Judgment: Judgment normal.       Assessment/Plan:     1. Fall, initial encounter  2. Breast pain  3. Chin injury, initial " encounter  4. Left arm pain  Discussed risk versus benefits of x-ray, at this time patient politely declined.  She is still actively using all injured body parts normally. She was encouraged to use tylenol and ibuprofen as needed      Instructed to return to Urgent Care or nearest Emergency Department if symptoms fail to improve, for any change in condition, further concerns, or new concerning symptoms. Patient states understanding of the plan of care and discharge instructions.    Genet Wen M.D.

## 2023-10-31 ENCOUNTER — HOSPITAL ENCOUNTER (OUTPATIENT)
Dept: RADIOLOGY | Facility: MEDICAL CENTER | Age: 68
End: 2023-10-31
Attending: NURSE PRACTITIONER
Payer: MEDICARE

## 2023-10-31 DIAGNOSIS — M54.16 RADICULOPATHY, LUMBAR REGION: ICD-10-CM

## 2023-10-31 PROCEDURE — 72110 X-RAY EXAM L-2 SPINE 4/>VWS: CPT

## 2023-11-14 ENCOUNTER — HOSPITAL ENCOUNTER (OUTPATIENT)
Dept: RADIOLOGY | Facility: MEDICAL CENTER | Age: 68
End: 2023-11-14
Attending: NURSE PRACTITIONER
Payer: MEDICARE

## 2023-11-14 DIAGNOSIS — M54.16 RADICULOPATHY, LUMBAR REGION: ICD-10-CM

## 2023-11-14 PROCEDURE — 72148 MRI LUMBAR SPINE W/O DYE: CPT

## 2024-02-08 ENCOUNTER — PHARMACY VISIT (OUTPATIENT)
Dept: PHARMACY | Facility: MEDICAL CENTER | Age: 69
End: 2024-02-08
Payer: COMMERCIAL

## 2024-02-08 PROCEDURE — RXMED WILLOW AMBULATORY MEDICATION CHARGE: Performed by: INTERNAL MEDICINE

## 2024-02-08 RX ORDER — INFLUENZA A VIRUS A/MICHIGAN/45/2015 X-275 (H1N1) ANTIGEN (FORMALDEHYDE INACTIVATED), INFLUENZA A VIRUS A/SINGAPORE/INFIMH-16-0019/2016 IVR-186 (H3N2) ANTIGEN (FORMALDEHYDE INACTIVATED), INFLUENZA B VIRUS B/PHUKET/3073/2013 ANTIGEN (FORMALDEHYDE INACTIVATED), AND INFLUENZA B VIRUS B/MARYLAND/15/2016 BX-69A ANTIGEN (FORMALDEHYDE INACTIVATED) 60; 60; 60; 60 UG/.7ML; UG/.7ML; UG/.7ML; UG/.7ML
INJECTION, SUSPENSION INTRAMUSCULAR
Qty: 0.7 ML | Refills: 0 | OUTPATIENT
Start: 2024-02-08

## 2024-03-29 ENCOUNTER — PHARMACY VISIT (OUTPATIENT)
Dept: PHARMACY | Facility: MEDICAL CENTER | Age: 69
End: 2024-03-29
Payer: COMMERCIAL

## 2024-03-29 PROCEDURE — RXMED WILLOW AMBULATORY MEDICATION CHARGE: Performed by: INTERNAL MEDICINE

## 2024-03-29 RX ORDER — ZOSTER VACCINE RECOMBINANT, ADJUVANTED 50 MCG/0.5
KIT INTRAMUSCULAR
Qty: 0.5 ML | Refills: 0 | OUTPATIENT
Start: 2024-03-29

## 2024-04-19 ENCOUNTER — HOSPITAL ENCOUNTER (OUTPATIENT)
Dept: LAB | Facility: MEDICAL CENTER | Age: 69
End: 2024-04-19
Attending: FAMILY MEDICINE
Payer: MEDICARE

## 2024-04-19 LAB
25(OH)D3 SERPL-MCNC: 32 NG/ML (ref 30–100)
ALBUMIN SERPL BCP-MCNC: 4.8 G/DL (ref 3.2–4.9)
ALBUMIN/GLOB SERPL: 2.1 G/DL
ALP SERPL-CCNC: 85 U/L (ref 30–99)
ALT SERPL-CCNC: 18 U/L (ref 2–50)
ANION GAP SERPL CALC-SCNC: 13 MMOL/L (ref 7–16)
AST SERPL-CCNC: 30 U/L (ref 12–45)
BASOPHILS # BLD AUTO: 0.7 % (ref 0–1.8)
BASOPHILS # BLD: 0.02 K/UL (ref 0–0.12)
BILIRUB SERPL-MCNC: 0.8 MG/DL (ref 0.1–1.5)
BUN SERPL-MCNC: 23 MG/DL (ref 8–22)
CALCIUM ALBUM COR SERPL-MCNC: 9.2 MG/DL (ref 8.5–10.5)
CALCIUM SERPL-MCNC: 9.8 MG/DL (ref 8.5–10.5)
CHLORIDE SERPL-SCNC: 104 MMOL/L (ref 96–112)
CHOLEST SERPL-MCNC: 195 MG/DL (ref 100–199)
CO2 SERPL-SCNC: 25 MMOL/L (ref 20–33)
CREAT SERPL-MCNC: 0.9 MG/DL (ref 0.5–1.4)
EOSINOPHIL # BLD AUTO: 0.03 K/UL (ref 0–0.51)
EOSINOPHIL NFR BLD: 1.1 % (ref 0–6.9)
ERYTHROCYTE [DISTWIDTH] IN BLOOD BY AUTOMATED COUNT: 41.3 FL (ref 35.9–50)
EST. AVERAGE GLUCOSE BLD GHB EST-MCNC: 111 MG/DL
FASTING STATUS PATIENT QL REPORTED: NORMAL
GFR SERPLBLD CREATININE-BSD FMLA CKD-EPI: 69 ML/MIN/1.73 M 2
GLOBULIN SER CALC-MCNC: 2.3 G/DL (ref 1.9–3.5)
GLUCOSE SERPL-MCNC: 87 MG/DL (ref 65–99)
HBA1C MFR BLD: 5.5 % (ref 4–5.6)
HCT VFR BLD AUTO: 41.3 % (ref 37–47)
HDLC SERPL-MCNC: 73 MG/DL
HGB BLD-MCNC: 14 G/DL (ref 12–16)
IMM GRANULOCYTES # BLD AUTO: 0.01 K/UL (ref 0–0.11)
IMM GRANULOCYTES NFR BLD AUTO: 0.4 % (ref 0–0.9)
LDLC SERPL CALC-MCNC: 107 MG/DL
LYMPHOCYTES # BLD AUTO: 0.93 K/UL (ref 1–4.8)
LYMPHOCYTES NFR BLD: 33.9 % (ref 22–41)
MCH RBC QN AUTO: 31.1 PG (ref 27–33)
MCHC RBC AUTO-ENTMCNC: 33.9 G/DL (ref 32.2–35.5)
MCV RBC AUTO: 91.8 FL (ref 81.4–97.8)
MONOCYTES # BLD AUTO: 0.24 K/UL (ref 0–0.85)
MONOCYTES NFR BLD AUTO: 8.8 % (ref 0–13.4)
NEUTROPHILS # BLD AUTO: 1.51 K/UL (ref 1.82–7.42)
NEUTROPHILS NFR BLD: 55.1 % (ref 44–72)
NRBC # BLD AUTO: 0 K/UL
NRBC BLD-RTO: 0 /100 WBC (ref 0–0.2)
PLATELET # BLD AUTO: 213 K/UL (ref 164–446)
PMV BLD AUTO: 10.5 FL (ref 9–12.9)
POTASSIUM SERPL-SCNC: 4.6 MMOL/L (ref 3.6–5.5)
PROT SERPL-MCNC: 7.1 G/DL (ref 6–8.2)
RBC # BLD AUTO: 4.5 M/UL (ref 4.2–5.4)
SODIUM SERPL-SCNC: 142 MMOL/L (ref 135–145)
TRIGL SERPL-MCNC: 74 MG/DL (ref 0–149)
VIT B12 SERPL-MCNC: 716 PG/ML (ref 211–911)
WBC # BLD AUTO: 2.7 K/UL (ref 4.8–10.8)

## 2024-04-19 PROCEDURE — 80061 LIPID PANEL: CPT

## 2024-04-19 PROCEDURE — 83036 HEMOGLOBIN GLYCOSYLATED A1C: CPT

## 2024-04-19 PROCEDURE — 82306 VITAMIN D 25 HYDROXY: CPT

## 2024-04-19 PROCEDURE — 85025 COMPLETE CBC W/AUTO DIFF WBC: CPT

## 2024-04-19 PROCEDURE — 36415 COLL VENOUS BLD VENIPUNCTURE: CPT

## 2024-04-19 PROCEDURE — 80053 COMPREHEN METABOLIC PANEL: CPT

## 2024-04-19 PROCEDURE — 82607 VITAMIN B-12: CPT

## 2024-09-03 ENCOUNTER — HOSPITAL ENCOUNTER (OUTPATIENT)
Dept: LAB | Facility: MEDICAL CENTER | Age: 69
End: 2024-09-03
Attending: FAMILY MEDICINE
Payer: MEDICARE

## 2024-09-05 ENCOUNTER — HOSPITAL ENCOUNTER (OUTPATIENT)
Dept: LAB | Facility: MEDICAL CENTER | Age: 69
End: 2024-09-05
Attending: FAMILY MEDICINE
Payer: MEDICARE

## 2024-09-05 LAB
25(OH)D3 SERPL-MCNC: 29 NG/ML (ref 30–100)
ALBUMIN SERPL BCP-MCNC: 4.4 G/DL (ref 3.2–4.9)
ALBUMIN/GLOB SERPL: 1.9 G/DL
ALP SERPL-CCNC: 84 U/L (ref 30–99)
ALT SERPL-CCNC: 23 U/L (ref 2–50)
ANION GAP SERPL CALC-SCNC: 14 MMOL/L (ref 7–16)
AST SERPL-CCNC: 28 U/L (ref 12–45)
BASOPHILS # BLD AUTO: 0.2 % (ref 0–1.8)
BASOPHILS # BLD: 0.01 K/UL (ref 0–0.12)
BILIRUB SERPL-MCNC: 0.7 MG/DL (ref 0.1–1.5)
BUN SERPL-MCNC: 16 MG/DL (ref 8–22)
CALCIUM ALBUM COR SERPL-MCNC: 9 MG/DL (ref 8.5–10.5)
CALCIUM SERPL-MCNC: 9.3 MG/DL (ref 8.5–10.5)
CHLORIDE SERPL-SCNC: 107 MMOL/L (ref 96–112)
CHOLEST SERPL-MCNC: 167 MG/DL (ref 100–199)
CO2 SERPL-SCNC: 23 MMOL/L (ref 20–33)
CREAT SERPL-MCNC: 0.7 MG/DL (ref 0.5–1.4)
EOSINOPHIL # BLD AUTO: 0.06 K/UL (ref 0–0.51)
EOSINOPHIL NFR BLD: 1.4 % (ref 0–6.9)
ERYTHROCYTE [DISTWIDTH] IN BLOOD BY AUTOMATED COUNT: 45.6 FL (ref 35.9–50)
FASTING STATUS PATIENT QL REPORTED: NORMAL
GFR SERPLBLD CREATININE-BSD FMLA CKD-EPI: 93 ML/MIN/1.73 M 2
GLOBULIN SER CALC-MCNC: 2.3 G/DL (ref 1.9–3.5)
GLUCOSE SERPL-MCNC: 84 MG/DL (ref 65–99)
HCT VFR BLD AUTO: 41 % (ref 37–47)
HDLC SERPL-MCNC: 74 MG/DL
HGB BLD-MCNC: 13.2 G/DL (ref 12–16)
IMM GRANULOCYTES # BLD AUTO: 0.01 K/UL (ref 0–0.11)
IMM GRANULOCYTES NFR BLD AUTO: 0.2 % (ref 0–0.9)
LDLC SERPL CALC-MCNC: 77 MG/DL
LYMPHOCYTES # BLD AUTO: 0.87 K/UL (ref 1–4.8)
LYMPHOCYTES NFR BLD: 19.9 % (ref 22–41)
MCH RBC QN AUTO: 30.8 PG (ref 27–33)
MCHC RBC AUTO-ENTMCNC: 32.2 G/DL (ref 32.2–35.5)
MCV RBC AUTO: 95.6 FL (ref 81.4–97.8)
MONOCYTES # BLD AUTO: 0.33 K/UL (ref 0–0.85)
MONOCYTES NFR BLD AUTO: 7.5 % (ref 0–13.4)
NEUTROPHILS # BLD AUTO: 3.1 K/UL (ref 1.82–7.42)
NEUTROPHILS NFR BLD: 70.8 % (ref 44–72)
NRBC # BLD AUTO: 0 K/UL
NRBC BLD-RTO: 0 /100 WBC (ref 0–0.2)
PLATELET # BLD AUTO: 184 K/UL (ref 164–446)
PMV BLD AUTO: 10.7 FL (ref 9–12.9)
POTASSIUM SERPL-SCNC: 3.9 MMOL/L (ref 3.6–5.5)
PROT SERPL-MCNC: 6.7 G/DL (ref 6–8.2)
RBC # BLD AUTO: 4.29 M/UL (ref 4.2–5.4)
SODIUM SERPL-SCNC: 144 MMOL/L (ref 135–145)
TRIGL SERPL-MCNC: 78 MG/DL (ref 0–149)
TSH SERPL-ACNC: 2.69 UIU/ML (ref 0.35–5.5)
WBC # BLD AUTO: 4.4 K/UL (ref 4.8–10.8)

## 2024-09-05 PROCEDURE — 86038 ANTINUCLEAR ANTIBODIES: CPT

## 2024-09-05 PROCEDURE — 80061 LIPID PANEL: CPT

## 2024-09-05 PROCEDURE — 80053 COMPREHEN METABOLIC PANEL: CPT

## 2024-09-05 PROCEDURE — 36415 COLL VENOUS BLD VENIPUNCTURE: CPT

## 2024-09-05 PROCEDURE — 82306 VITAMIN D 25 HYDROXY: CPT

## 2024-09-05 PROCEDURE — 84443 ASSAY THYROID STIM HORMONE: CPT

## 2024-09-05 PROCEDURE — 85025 COMPLETE CBC W/AUTO DIFF WBC: CPT

## 2024-09-07 LAB — NUCLEAR IGG SER QL IA: NORMAL

## 2024-09-12 ENCOUNTER — OFFICE VISIT (OUTPATIENT)
Dept: URGENT CARE | Facility: PHYSICIAN GROUP | Age: 69
End: 2024-09-12
Payer: MEDICARE

## 2024-09-12 VITALS
WEIGHT: 117 LBS | BODY MASS INDEX: 22.09 KG/M2 | DIASTOLIC BLOOD PRESSURE: 72 MMHG | SYSTOLIC BLOOD PRESSURE: 116 MMHG | TEMPERATURE: 98.4 F | HEART RATE: 66 BPM | RESPIRATION RATE: 20 BRPM | HEIGHT: 61 IN | OXYGEN SATURATION: 96 %

## 2024-09-12 DIAGNOSIS — U07.1 COVID-19: ICD-10-CM

## 2024-09-12 LAB
FLUAV RNA SPEC QL NAA+PROBE: NEGATIVE
FLUBV RNA SPEC QL NAA+PROBE: NEGATIVE
RSV RNA SPEC QL NAA+PROBE: NEGATIVE
SARS-COV-2 RNA RESP QL NAA+PROBE: POSITIVE

## 2024-09-12 PROCEDURE — 3078F DIAST BP <80 MM HG: CPT

## 2024-09-12 PROCEDURE — 99214 OFFICE O/P EST MOD 30 MIN: CPT

## 2024-09-12 PROCEDURE — 3074F SYST BP LT 130 MM HG: CPT

## 2024-09-12 PROCEDURE — 0241U POCT CEPHEID COV-2, FLU A/B, RSV - PCR: CPT

## 2024-09-12 RX ORDER — BENZONATATE 100 MG/1
100 CAPSULE ORAL 3 TIMES DAILY PRN
Qty: 60 CAPSULE | Refills: 0 | Status: SHIPPED | OUTPATIENT
Start: 2024-09-12

## 2024-09-12 RX ORDER — BENZONATATE 100 MG/1
100 CAPSULE ORAL 3 TIMES DAILY PRN
Qty: 60 CAPSULE | Refills: 0 | Status: SHIPPED | OUTPATIENT
Start: 2024-09-12 | End: 2024-09-12

## 2024-09-12 ASSESSMENT — ENCOUNTER SYMPTOMS
CHILLS: 1
FEVER: 0
SORE THROAT: 1
COUGH: 1

## 2024-09-12 ASSESSMENT — FIBROSIS 4 INDEX: FIB4 SCORE: 2.22

## 2024-09-12 NOTE — PROGRESS NOTES
Subjective:     CHIEF COMPLAINT  Chief Complaint   Patient presents with    URI     Cough, pain in chest with coughing, hx of palpitations when feeling sick, aches and pains x3 days       HPI  Haley Hawthorne is a very pleasant 69 y.o. female who presents with a cough, congestion, sore throat, and chills for the past 3 days.  She has not had any known sick contacts.  She does report she has been experiencing some palpitations that she typically experiences when she gets sick.  She denies any chest pain other than some chest discomfort with coughing.    REVIEW OF SYSTEMS  Review of Systems   Constitutional:  Positive for chills and malaise/fatigue. Negative for fever (Has felt feverish).   HENT:  Positive for congestion, ear pain and sore throat.    Respiratory:  Positive for cough.    Cardiovascular:  Negative for chest pain.       PAST MEDICAL HISTORY  Patient Active Problem List    Diagnosis Date Noted    Vertigo 02/03/2023    Cough 02/03/2023    Rosado's esophagus without dysplasia 12/09/2022    Rhinorrhea 09/15/2022    Nasal mucosa dry 06/22/2022    Hyperlipidemia 04/13/2022    Elevated liver function tests 04/12/2022    Fall 04/12/2022    Insomnia 01/10/2022    DDD (degenerative disc disease), lumbar 01/10/2022    Gastroesophageal reflux disease 08/08/2019    Nonobstructive atherosclerosis of coronary artery 08/08/2019    Obstructive sleep apnea syndrome 08/08/2019    Abnormal stress test 07/12/2019    Snoring 06/26/2019    Essential hypertension, benign 06/26/2019    Palpitations 06/26/2019    Chest tightness 06/26/2019    Primary osteoarthritis of first carpometacarpal joint of left hand 09/25/2018    Localized primary carpometacarpal osteoarthritis, right 06/05/2018    History of ductal carcinoma in situ (DCIS) of breast 04/21/2015       SURGICAL HISTORY   has a past surgical history that includes breast biopsy (unknown); tonsillectomy; partial mastectomy (4/21/2015); lumpectomy (4/21/2015);  ganglion excision (Left, 2018); lap,appendectomy (2019); hysterectomy laparoscopy (); carpal tunnel release (); finger arthroplasty (Right, 2018); tendon transfer (2018); finger arthroplasty (Left, 2018); repair, tendon, lower extremity, using tendon graft (Left, 2018); repair of nasal septum (N/A, 2020); turbinoplasty (Bilateral, 2020); abdominal exploration; appendectomy; and brca1&2 gen full seq dup/del.    ALLERGIES  Allergies   Allergen Reactions    Sulfa Drugs Hives       CURRENT MEDICATIONS  Home Medications       Reviewed by Chucho Kenney Ass't (Medical Assistant) on 24 at 0920  Med List Status: <None>     Medication Last Dose Status   atorvastatin (LIPITOR) 40 MG Tab Taking Active   fluticasone (FLOVENT HFA) 44 MCG/ACT Aerosol Taking Active   influenza Vac High-Dose Quad (FLUZONE HIGH-DOSE QUADRIVALENT) 0.7 ML Suspension Prefilled Syringe injection Taking Active   losartan (COZAAR) 25 MG Tab Taking Active   omeprazole (PRILOSEC) 20 MG CPDR Taking Active   paroxetine (PAXIL) 40 MG tablet Taking Active   verapamil ER (CALAN-SR) 240 MG Tab CR Taking Active   Zoster Vac Recomb Adjuvanted (SHINGRIX) 50 MCG/0.5ML Recon Susp Taking Active                    SOCIAL HISTORY  Social History     Tobacco Use    Smoking status: Former     Current packs/day: 0.00     Average packs/day: 1 pack/day for 10.0 years (10.0 ttl pk-yrs)     Types: Cigarettes     Start date: 1969     Quit date: 1979     Years since quittin.7    Smokeless tobacco: Never   Vaping Use    Vaping status: Never Used   Substance and Sexual Activity    Alcohol use: Not Currently     Comment: occasional    Drug use: No    Sexual activity: Not on file       FAMILY HISTORY  Family History   Problem Relation Age of Onset    Stroke Mother     Cancer Sister     Heart Disease Brother     Cancer Brother     Sleep Apnea Son           Objective:     VITAL SIGNS: /72 (BP  "Location: Right arm, Patient Position: Sitting, BP Cuff Size: Adult)   Pulse 66   Temp 36.9 °C (98.4 °F) (Temporal)   Resp 20   Ht 1.549 m (5' 1\")   Wt 53.1 kg (117 lb)   SpO2 96%   BMI 22.11 kg/m²     PHYSICAL EXAM  Physical Exam  Vitals reviewed.   Constitutional:       General: She is not in acute distress.     Appearance: Normal appearance. She is ill-appearing. She is not toxic-appearing.   HENT:      Head: Normocephalic and atraumatic.      Right Ear: Tympanic membrane, ear canal and external ear normal.      Left Ear: Tympanic membrane, ear canal and external ear normal.      Nose: Congestion present.      Mouth/Throat:      Mouth: Mucous membranes are moist.      Pharynx: Posterior oropharyngeal erythema present. No oropharyngeal exudate.      Comments: Uvula midline  Eyes:      Conjunctiva/sclera: Conjunctivae normal.      Pupils: Pupils are equal, round, and reactive to light.   Cardiovascular:      Rate and Rhythm: Normal rate and regular rhythm.      Heart sounds: Normal heart sounds.   Pulmonary:      Effort: Pulmonary effort is normal. No respiratory distress.      Breath sounds: Normal breath sounds. No stridor. No wheezing, rhonchi or rales.   Skin:     General: Skin is warm and dry.      Coloration: Skin is not pale.   Neurological:      General: No focal deficit present.      Mental Status: She is alert and oriented to person, place, and time.   Psychiatric:         Mood and Affect: Mood normal.         Assessment/Plan:     1. COVID-19  - POCT CoV-2, Flu A/B, RSV by PCR  - benzonatate (TESSALON) 100 MG Cap; Take 1 Capsule by mouth 3 times a day as needed for Cough.  Dispense: 60 Capsule; Refill: 0  - Nirmatrelvir&Ritonavir 300/100 20 x 150 MG & 10 x 100MG Tablet Therapy Pack; Take 300 mg nirmatrelvir (two 150 mg tablets) with 100 mg ritonavir (one 100 mg tablet) by mouth, with all three tablets taken together twice daily for 5 days.  Dispense: 30 Each; Refill: 0  -Tylenol OTC as needed for " discomfort  -Rest and hydrate  -Return to clinic if symptoms worsen or fail to resolve  -Monitor blood pressure while taking Paxlovid  -Follow CDC isolation guidelines    MDM/Comments:  I have prepared for this visit by personally reviewing the patient's prevous medical records, vitals, and labs including: most recent GFR and CMP. Patient has stable vital signs and is non-toxic appearing.  Viral testing for COVID, influenza, and RSV performed in office with positive COVID results.  Patient called and informed of results.  Shared decision making used and patient will be initiated on Paxlovid.  Patient's medications evaluated for possible interactions with Paxlovid with potential for interaction with patient's verapamil.  Discussed monitoring blood pressure closely while taking Paxlovid.  Patient demonstrated understanding and was agreeable to use of Paxlovid.  CDC isolation guidelines discussed.  Patient provided benzonatate for cough relief.  Discussed supportive care with hydration, rest, Tylenol as needed. Patient demonstrated understanding of treatment plan at this time and will RTC if symptoms worsen or fail to resolve.       Differential diagnosis, natural history, supportive care, and indications for immediate follow-up discussed. All questions answered. Patient agrees with the plan of care.    Follow-up as needed if symptoms worsen or fail to improve to PCP, Urgent care or Emergency Room.    I have personally reviewed prior external notes and test results pertinent to today's visit.  I have independently reviewed and interpreted all diagnostics ordered during this urgent care acute visit.   Discussed management options (risks,benefits, and alternatives to treatment). Pt expresses understanding and the treatment plan was agreed upon. Questions were encouraged and answered to pt's satisfaction.    Please note that this dictation was created using voice recognition software. I have made a reasonable attempt to  correct obvious errors, but I expect that there are errors of grammar and possibly content that I did not discover before finalizing the note.

## 2024-10-05 ASSESSMENT — ENCOUNTER SYMPTOMS: GENERAL WELL-BEING: GOOD

## 2024-10-05 ASSESSMENT — PATIENT HEALTH QUESTIONNAIRE - PHQ9
2. FEELING DOWN, DEPRESSED, IRRITABLE, OR HOPELESS: SEVERAL DAYS
1. LITTLE INTEREST OR PLEASURE IN DOING THINGS: NOT AT ALL

## 2024-10-05 ASSESSMENT — ACTIVITIES OF DAILY LIVING (ADL): BATHING_REQUIRES_ASSISTANCE: 0

## 2024-10-08 ENCOUNTER — OFFICE VISIT (OUTPATIENT)
Dept: FAMILY PLANNING/WOMEN'S HEALTH CLINIC | Facility: PHYSICIAN GROUP | Age: 69
End: 2024-10-08
Payer: MEDICARE

## 2024-10-08 VITALS
DIASTOLIC BLOOD PRESSURE: 68 MMHG | SYSTOLIC BLOOD PRESSURE: 116 MMHG | HEIGHT: 61 IN | BODY MASS INDEX: 21.71 KG/M2 | WEIGHT: 115 LBS

## 2024-10-08 DIAGNOSIS — E78.5 HYPERLIPIDEMIA, UNSPECIFIED HYPERLIPIDEMIA TYPE: ICD-10-CM

## 2024-10-08 DIAGNOSIS — K22.70 BARRETT'S ESOPHAGUS WITHOUT DYSPLASIA: ICD-10-CM

## 2024-10-08 DIAGNOSIS — I10 ESSENTIAL HYPERTENSION, BENIGN: ICD-10-CM

## 2024-10-08 DIAGNOSIS — I70.0 ATHEROSCLEROSIS OF AORTA (HCC): ICD-10-CM

## 2024-10-08 DIAGNOSIS — K21.9 GASTROESOPHAGEAL REFLUX DISEASE, UNSPECIFIED WHETHER ESOPHAGITIS PRESENT: ICD-10-CM

## 2024-10-08 DIAGNOSIS — I25.10 NONOBSTRUCTIVE ATHEROSCLEROSIS OF CORONARY ARTERY: ICD-10-CM

## 2024-10-08 PROCEDURE — G0439 PPPS, SUBSEQ VISIT: HCPCS

## 2024-10-08 PROCEDURE — 3074F SYST BP LT 130 MM HG: CPT

## 2024-10-08 PROCEDURE — 3078F DIAST BP <80 MM HG: CPT

## 2024-10-08 PROCEDURE — 1126F AMNT PAIN NOTED NONE PRSNT: CPT

## 2024-10-08 SDOH — ECONOMIC STABILITY: INCOME INSECURITY: HOW HARD IS IT FOR YOU TO PAY FOR THE VERY BASICS LIKE FOOD, HOUSING, MEDICAL CARE, AND HEATING?: NOT VERY HARD

## 2024-10-08 SDOH — ECONOMIC STABILITY: INCOME INSECURITY: IN THE LAST 12 MONTHS, WAS THERE A TIME WHEN YOU WERE NOT ABLE TO PAY THE MORTGAGE OR RENT ON TIME?: NO

## 2024-10-08 SDOH — ECONOMIC STABILITY: HOUSING INSECURITY: AT ANY TIME IN THE PAST 12 MONTHS, WERE YOU HOMELESS OR LIVING IN A SHELTER (INCLUDING NOW)?: NO

## 2024-10-08 SDOH — ECONOMIC STABILITY: FOOD INSECURITY: WITHIN THE PAST 12 MONTHS, YOU WORRIED THAT YOUR FOOD WOULD RUN OUT BEFORE YOU GOT MONEY TO BUY MORE.: NEVER TRUE

## 2024-10-08 SDOH — ECONOMIC STABILITY: FOOD INSECURITY: WITHIN THE PAST 12 MONTHS, THE FOOD YOU BOUGHT JUST DIDN'T LAST AND YOU DIDN'T HAVE MONEY TO GET MORE.: NEVER TRUE

## 2024-10-08 SDOH — ECONOMIC STABILITY: TRANSPORTATION INSECURITY
IN THE PAST 12 MONTHS, HAS LACK OF TRANSPORTATION KEPT YOU FROM MEETINGS, WORK, OR FROM GETTING THINGS NEEDED FOR DAILY LIVING?: NO

## 2024-10-08 SDOH — ECONOMIC STABILITY: HOUSING INSECURITY: IN THE PAST 12 MONTHS, HOW MANY TIMES HAVE YOU MOVED WHERE YOU WERE LIVING?: 0

## 2024-10-08 SDOH — ECONOMIC STABILITY: TRANSPORTATION INSECURITY
IN THE PAST 12 MONTHS, HAS THE LACK OF TRANSPORTATION KEPT YOU FROM MEDICAL APPOINTMENTS OR FROM GETTING MEDICATIONS?: NO

## 2024-10-08 ASSESSMENT — PATIENT HEALTH QUESTIONNAIRE - PHQ9
CLINICAL INTERPRETATION OF PHQ2 SCORE: 1
5. POOR APPETITE OR OVEREATING: 0 - NOT AT ALL
SUM OF ALL RESPONSES TO PHQ QUESTIONS 1-9: 3

## 2024-10-08 ASSESSMENT — FIBROSIS 4 INDEX: FIB4 SCORE: 2.22

## 2024-10-08 ASSESSMENT — PAIN SCALES - GENERAL: PAINLEVEL: NO PAIN

## 2024-12-17 ENCOUNTER — OFFICE VISIT (OUTPATIENT)
Dept: MEDICAL GROUP | Facility: PHYSICIAN GROUP | Age: 69
End: 2024-12-17
Payer: MEDICARE

## 2024-12-17 VITALS
BODY MASS INDEX: 21.7 KG/M2 | TEMPERATURE: 97.9 F | HEART RATE: 53 BPM | OXYGEN SATURATION: 98 % | HEIGHT: 62 IN | SYSTOLIC BLOOD PRESSURE: 142 MMHG | WEIGHT: 117.95 LBS | DIASTOLIC BLOOD PRESSURE: 68 MMHG

## 2024-12-17 DIAGNOSIS — Z12.31 ENCOUNTER FOR SCREENING MAMMOGRAM FOR MALIGNANT NEOPLASM OF BREAST: ICD-10-CM

## 2024-12-17 DIAGNOSIS — G31.9 MILD CEREBRAL ATROPHY (HCC): ICD-10-CM

## 2024-12-17 DIAGNOSIS — K21.9 GASTROESOPHAGEAL REFLUX DISEASE, UNSPECIFIED WHETHER ESOPHAGITIS PRESENT: Chronic | ICD-10-CM

## 2024-12-17 DIAGNOSIS — I10 ESSENTIAL HYPERTENSION, BENIGN: Chronic | ICD-10-CM

## 2024-12-17 DIAGNOSIS — I70.0 ATHEROSCLEROSIS OF AORTA (HCC): Chronic | ICD-10-CM

## 2024-12-17 DIAGNOSIS — F33.41 RECURRENT MAJOR DEPRESSIVE DISORDER, IN PARTIAL REMISSION (HCC): ICD-10-CM

## 2024-12-17 DIAGNOSIS — G25.0 TREMOR, ESSENTIAL: ICD-10-CM

## 2024-12-17 DIAGNOSIS — E78.5 HYPERLIPIDEMIA, UNSPECIFIED HYPERLIPIDEMIA TYPE: Chronic | ICD-10-CM

## 2024-12-17 DIAGNOSIS — Z86.000 HISTORY OF DUCTAL CARCINOMA IN SITU (DCIS) OF BREAST: ICD-10-CM

## 2024-12-17 DIAGNOSIS — J32.9 CHRONIC SINUSITIS, UNSPECIFIED LOCATION: ICD-10-CM

## 2024-12-17 DIAGNOSIS — M85.89 OSTEOPENIA OF MULTIPLE SITES: ICD-10-CM

## 2024-12-17 DIAGNOSIS — K22.70 BARRETT'S ESOPHAGUS WITHOUT DYSPLASIA: Chronic | ICD-10-CM

## 2024-12-17 DIAGNOSIS — Z76.89 ENCOUNTER TO ESTABLISH CARE: ICD-10-CM

## 2024-12-17 DIAGNOSIS — Z23 NEED FOR VACCINATION: ICD-10-CM

## 2024-12-17 PROCEDURE — G0008 ADMIN INFLUENZA VIRUS VAC: HCPCS | Performed by: STUDENT IN AN ORGANIZED HEALTH CARE EDUCATION/TRAINING PROGRAM

## 2024-12-17 PROCEDURE — 90677 PCV20 VACCINE IM: CPT | Performed by: STUDENT IN AN ORGANIZED HEALTH CARE EDUCATION/TRAINING PROGRAM

## 2024-12-17 PROCEDURE — 3078F DIAST BP <80 MM HG: CPT | Performed by: STUDENT IN AN ORGANIZED HEALTH CARE EDUCATION/TRAINING PROGRAM

## 2024-12-17 PROCEDURE — 90662 IIV NO PRSV INCREASED AG IM: CPT | Performed by: STUDENT IN AN ORGANIZED HEALTH CARE EDUCATION/TRAINING PROGRAM

## 2024-12-17 PROCEDURE — 99215 OFFICE O/P EST HI 40 MIN: CPT | Mod: 25 | Performed by: STUDENT IN AN ORGANIZED HEALTH CARE EDUCATION/TRAINING PROGRAM

## 2024-12-17 PROCEDURE — 3077F SYST BP >= 140 MM HG: CPT | Performed by: STUDENT IN AN ORGANIZED HEALTH CARE EDUCATION/TRAINING PROGRAM

## 2024-12-17 PROCEDURE — G0009 ADMIN PNEUMOCOCCAL VACCINE: HCPCS | Performed by: STUDENT IN AN ORGANIZED HEALTH CARE EDUCATION/TRAINING PROGRAM

## 2024-12-17 RX ORDER — IPRATROPIUM BROMIDE 42 UG/1
2 SPRAY, METERED NASAL 4 TIMES DAILY
Qty: 15 ML | Refills: 1 | Status: SHIPPED | OUTPATIENT
Start: 2024-12-17

## 2024-12-17 RX ORDER — ALENDRONATE SODIUM 70 MG/1
70 TABLET ORAL
Qty: 12 TABLET | Refills: 3 | Status: SHIPPED | OUTPATIENT
Start: 2024-12-17

## 2024-12-17 SDOH — ECONOMIC STABILITY: FOOD INSECURITY: WITHIN THE PAST 12 MONTHS, THE FOOD YOU BOUGHT JUST DIDN'T LAST AND YOU DIDN'T HAVE MONEY TO GET MORE.: NEVER TRUE

## 2024-12-17 SDOH — HEALTH STABILITY: PHYSICAL HEALTH: ON AVERAGE, HOW MANY DAYS PER WEEK DO YOU ENGAGE IN MODERATE TO STRENUOUS EXERCISE (LIKE A BRISK WALK)?: 5 DAYS

## 2024-12-17 SDOH — ECONOMIC STABILITY: INCOME INSECURITY: HOW HARD IS IT FOR YOU TO PAY FOR THE VERY BASICS LIKE FOOD, HOUSING, MEDICAL CARE, AND HEATING?: NOT VERY HARD

## 2024-12-17 SDOH — HEALTH STABILITY: PHYSICAL HEALTH: ON AVERAGE, HOW MANY MINUTES DO YOU ENGAGE IN EXERCISE AT THIS LEVEL?: 20 MIN

## 2024-12-17 SDOH — ECONOMIC STABILITY: INCOME INSECURITY: IN THE LAST 12 MONTHS, WAS THERE A TIME WHEN YOU WERE NOT ABLE TO PAY THE MORTGAGE OR RENT ON TIME?: NO

## 2024-12-17 SDOH — ECONOMIC STABILITY: FOOD INSECURITY: WITHIN THE PAST 12 MONTHS, YOU WORRIED THAT YOUR FOOD WOULD RUN OUT BEFORE YOU GOT MONEY TO BUY MORE.: NEVER TRUE

## 2024-12-17 SDOH — ECONOMIC STABILITY: HOUSING INSECURITY
IN THE LAST 12 MONTHS, WAS THERE A TIME WHEN YOU DID NOT HAVE A STEADY PLACE TO SLEEP OR SLEPT IN A SHELTER (INCLUDING NOW)?: NO

## 2024-12-17 SDOH — HEALTH STABILITY: MENTAL HEALTH
STRESS IS WHEN SOMEONE FEELS TENSE, NERVOUS, ANXIOUS, OR CAN'T SLEEP AT NIGHT BECAUSE THEIR MIND IS TROUBLED. HOW STRESSED ARE YOU?: TO SOME EXTENT

## 2024-12-17 SDOH — ECONOMIC STABILITY: TRANSPORTATION INSECURITY
IN THE PAST 12 MONTHS, HAS LACK OF RELIABLE TRANSPORTATION KEPT YOU FROM MEDICAL APPOINTMENTS, MEETINGS, WORK OR FROM GETTING THINGS NEEDED FOR DAILY LIVING?: NO

## 2024-12-17 ASSESSMENT — SOCIAL DETERMINANTS OF HEALTH (SDOH)
HOW OFTEN DO YOU HAVE SIX OR MORE DRINKS ON ONE OCCASION: NEVER
HOW OFTEN DO YOU ATTENT MEETINGS OF THE CLUB OR ORGANIZATION YOU BELONG TO?: NEVER
IN A TYPICAL WEEK, HOW MANY TIMES DO YOU TALK ON THE PHONE WITH FAMILY, FRIENDS, OR NEIGHBORS?: ONCE A WEEK
IN A TYPICAL WEEK, HOW MANY TIMES DO YOU TALK ON THE PHONE WITH FAMILY, FRIENDS, OR NEIGHBORS?: ONCE A WEEK
HOW MANY DRINKS CONTAINING ALCOHOL DO YOU HAVE ON A TYPICAL DAY WHEN YOU ARE DRINKING: PATIENT DOES NOT DRINK
WITHIN THE PAST 12 MONTHS, YOU WORRIED THAT YOUR FOOD WOULD RUN OUT BEFORE YOU GOT THE MONEY TO BUY MORE: NEVER TRUE
IN THE PAST 12 MONTHS, HAS THE ELECTRIC, GAS, OIL, OR WATER COMPANY THREATENED TO SHUT OFF SERVICE IN YOUR HOME?: NO
HOW OFTEN DO YOU ATTENT MEETINGS OF THE CLUB OR ORGANIZATION YOU BELONG TO?: NEVER
HOW HARD IS IT FOR YOU TO PAY FOR THE VERY BASICS LIKE FOOD, HOUSING, MEDICAL CARE, AND HEATING?: NOT VERY HARD
HOW OFTEN DO YOU ATTEND CHURCH OR RELIGIOUS SERVICES?: MORE THAN 4 TIMES PER YEAR
DO YOU BELONG TO ANY CLUBS OR ORGANIZATIONS SUCH AS CHURCH GROUPS UNIONS, FRATERNAL OR ATHLETIC GROUPS, OR SCHOOL GROUPS?: NO
DO YOU BELONG TO ANY CLUBS OR ORGANIZATIONS SUCH AS CHURCH GROUPS UNIONS, FRATERNAL OR ATHLETIC GROUPS, OR SCHOOL GROUPS?: NO
HOW OFTEN DO YOU HAVE A DRINK CONTAINING ALCOHOL: NEVER
HOW OFTEN DO YOU GET TOGETHER WITH FRIENDS OR RELATIVES?: ONCE A WEEK
HOW OFTEN DO YOU ATTEND CHURCH OR RELIGIOUS SERVICES?: MORE THAN 4 TIMES PER YEAR
HOW OFTEN DO YOU GET TOGETHER WITH FRIENDS OR RELATIVES?: ONCE A WEEK

## 2024-12-17 ASSESSMENT — FIBROSIS 4 INDEX: FIB4 SCORE: 2.22

## 2024-12-17 ASSESSMENT — LIFESTYLE VARIABLES
HOW OFTEN DO YOU HAVE SIX OR MORE DRINKS ON ONE OCCASION: NEVER
HOW OFTEN DO YOU HAVE A DRINK CONTAINING ALCOHOL: NEVER
HOW MANY STANDARD DRINKS CONTAINING ALCOHOL DO YOU HAVE ON A TYPICAL DAY: PATIENT DOES NOT DRINK
AUDIT-C TOTAL SCORE: 0
SKIP TO QUESTIONS 9-10: 1

## 2024-12-17 NOTE — PROGRESS NOTES
Subjective:     CC:  [facility]    History of Present Illness  The patient is a 69-year-old female who presents to [facility].    She was diagnosed with breast cancer in 2015, which was managed with lumpectomy and radiation therapy. She has not had any issues since then. She continues to undergo regular mammograms and performs self-breast exams, although she reports difficulty in distinguishing normal from abnormal findings.    She reports chronic nasal symptoms, including dryness, bleeding, and congestion. She has previously consulted an ENT specialist and underwent nasal surgery. She reports that her nasal passages are constantly raw and dry, leading to bleeding. The use of Flonase provides temporary relief by moistening the nasal passages, but this also leads to congestion. She has no history of allergies and has not tried over-the-counter antihistamines. She has attempted to use a humidifier at home without success. She describes her condition as feeling like a constant cold.    She has been on paroxetine 40 mg daily for years for depression and reports doing well on the medication. She expresses a desire to discontinue the medication but fears withdrawal symptoms, which she experienced in the past after she stopped the medication for 1 week.    She reports experiencing pain in her back, which radiates around her body upon standing, making walking difficult. She has been using a cane for support. She has a history of osteopenia in her spine and hip.    She has a history of hypertension. /68 today. She is on losartan 25 mg daily and verapamil  mg daily.    She has a history of hyperlipidemia. She is on atorvastatin 40 mg daily.    She has a history of Rosado's esophagus seen on Dec 2022 EGD and repeat in 3 years recommended. She is on omeprazole 20 mg daily.    She has a history of arthritis in her hands and benign tremors, predominantly affecting her left hand. She is right-handed and does  not feel the need for medication at this time.    Supplemental Information  She had a colonoscopy in November 2022 and was advised to repeat it in 5 years. She is due for another endoscopy in December 2025 to monitor her Rosado's esophagus.    SOCIAL HISTORY  She quit smoking in 1979. She does not consume alcohol or use drugs. She is  but not currently sexually active. She is retired and has three children.    FAMILY HISTORY  Her mother had osteopenia and a hump on her back.    ALLERGIES  She is allergic to SULFA DRUGS.    MEDICATIONS  Current: Atorvastatin 40 mg, Flovent inhaler, Flonase nasal spray, losartan 25 mg daily, omeprazole 20 mg daily, paroxetine 40 mg daily, verapamil 240 mg daily, vitamin D3 supplement.    IMMUNIZATIONS  She has received two doses of the shingles vaccine and one influenza vaccine. She has not received the pneumonia vaccine post-65 years of age.          Health Maintenance: Completed    Allergies   Allergen Reactions    Sulfa Drugs Hives     Patient Active Problem List   Diagnosis    History of ductal carcinoma in situ (DCIS) of breast    Localized primary carpometacarpal osteoarthritis, right    Primary osteoarthritis of first carpometacarpal joint of left hand    Snoring    Essential hypertension, benign    Palpitations    Chest tightness    Abnormal stress test    Gastroesophageal reflux disease    Nonobstructive atherosclerosis of coronary artery    Obstructive sleep apnea syndrome    Insomnia    DDD (degenerative disc disease), lumbar    Elevated liver function tests    Fall    Hyperlipidemia    Nasal mucosa dry    Rhinorrhea    Rosado's esophagus without dysplasia    Vertigo    Cough    Atherosclerosis of aorta (HCC)    Recurrent major depressive disorder, in partial remission (HCC)    Chronic sinusitis    Osteopenia of multiple sites    Tremor, essential    Mild cerebral atrophy (HCC)     Current Outpatient Medications   Medication Sig Dispense Refill    ipratropium  (ATROVENT) 0.06 % Solution Administer 2 Sprays into affected nostril(S) 4 times a day. 15 mL 1    alendronate (FOSAMAX) 70 MG Tab Take 1 Tablet by mouth every 7 days. 12 Tablet 3    paroxetine (PAXIL) 40 MG tablet Take 40 mg by mouth every morning.      verapamil ER (CALAN-SR) 240 MG Tab CR TAKE 1 TABLET BY MOUTH EVERY DAY (Patient taking differently: Take 240 mg by mouth every morning.) 90 Tablet 3    atorvastatin (LIPITOR) 40 MG Tab Take 1 Tablet by mouth every day. (Patient taking differently: Take 40 mg by mouth every morning.) 90 Tablet 3    losartan (COZAAR) 25 MG Tab Take 1 Tablet by mouth every day. (Patient taking differently: Take 25 mg by mouth every morning.) 90 Tablet 3    fluticasone (FLOVENT HFA) 44 MCG/ACT Aerosol Inhale 2 Puffs 2 times a day as needed (Shortness of breath).      omeprazole (PRILOSEC) 20 MG CPDR Take 20 mg by mouth every morning.      Zoster Vac Recomb Adjuvanted (SHINGRIX) 50 MCG/0.5ML Recon Susp Inject  into the shoulder, thigh, or buttocks. 0.5 mL 0    influenza Vac High-Dose Quad (FLUZONE HIGH-DOSE QUADRIVALENT) 0.7 ML Suspension Prefilled Syringe injection Inject  into the shoulder, thigh, or buttocks. 0.7 mL 0     No current facility-administered medications for this visit.     Past Surgical History:   Procedure Laterality Date    [place] REPAIR OF NASAL SEPTUM N/A 05/28/2020    Procedure: SEPTOPLASTY, NOSE;  Surgeon: Arnulfo Salgado M.D.;  Location: SURGERY SAME DAY [facility];  Service: Ent    TURBINOPLASTY Bilateral 05/28/2020    Procedure: TURBINOPLASTY- SUBMUCOSAL APPROACH;  Surgeon: Arnulfo Salgado M.D.;  Location: SURGERY SAME DAY [facility];  Service: Ent    WV LAP,APPENDECTOMY  08/08/2019    Procedure: APPENDECTOMY, LAPAROSCOPIC;  Surgeon: John Cunningham M.D.;  [place] [facility] [place];  Service: General    GANGLION EXCISION Left 09/25/2018    Procedure: GANGLION EXCISION-  CYST;  Surgeon: Hieu Salamanca M.D.;  Location: SURGERY [facility];   Service: Orthopedics    FINGER ARTHROPLASTY Left 2018    Procedure: FINGER ARTHROPLASTY- CARPOMETACARPAL;  Surgeon: Hieu Salamanca M.D.;  Location: SURGERY [facility];  Service: Orthopedics    REPAIR, TENDON, LOWER EXTREMITY, USING TENDON GRAFT Left 2018    Procedure: FLEXOR TENDON REPAIR-  CARPI RADIALIS;  Surgeon: Hieu Salamanca M.D.;  Location: SURGERY [facility];  Service: Orthopedics    FINGER ARTHROPLASTY Right 2018    Procedure: FINGER ARTHROPLASTY - CARPOMETACARPAL;  Surgeon: Hieu Salamanca M.D.;  Location: [facility];  Service: Orthopedics    TENDON TRANSFER  2018    Procedure: TENDON TRANSFER - FLEXOR CARPI RADIALIS;  Surgeon: Hieu Salamanca M.D.;  Location: [facility];  Service: Orthopedics    PARTIAL MASTECTOMY  2015    Performed by Anna Licona M.D. at SURGERY SAME DAY [facility]    LUMPECTOMY  2015    Performed by Anna Licona M.D. at SURGERY SAME DAY [facility]    CARPAL TUNNEL RELEASE  2013    HYSTERECTOMY LAPAROSCOPY  2006    ABDOMINAL EXPLORATION      BRCA1&2 GEN FULL SEQ DUP/DEL      BREAST BIOPSY  unknown    left benign biopsy    TONSILLECTOMY        Social History     Socioeconomic History    Marital status:      Spouse name: Not on file    Number of children: 3    Years of education: Not on file    Highest education level: GED or equivalent   Occupational History    Occupation: Retired     Employer: Retired   Tobacco Use    Smoking status: Former     Current packs/day: 0.00     Average packs/day: 1 pack/day for 10.0 years (10.0 ttl pk-yrs)     Types: Cigarettes     Start date: 1969     Quit date: 1979     Years since quittin.9    Smokeless tobacco: Never   Vaping Use    Vaping status: Never Used   Substance and Sexual Activity    Alcohol use: Yes     Comment: Wine 2 xs a week - quit all together    Drug use: No    Sexual activity: Not Currently     Partners: Male   Other Topics Concern     Not on file   Social History Narrative    Not on file     Social Drivers of Health     Financial Resource Strain: Low Risk  (12/17/2024)    Overall Financial Resource Strain (CARDIA)     Difficulty of Paying Living Expenses: Not very hard   Food Insecurity: No Food Insecurity (12/17/2024)    Hunger Vital Sign     Worried About Running Out of Food in the Last Year: Never true     Ran Out of Food in the Last Year: Never true   Transportation Needs: No Transportation Needs (12/17/2024)    PRAPARE - Transportation     Lack of Transportation (Medical): No     Lack of Transportation (Non-Medical): No   Physical Activity: Insufficiently Active (12/17/2024)    Exercise Vital Sign     Days of Exercise per Week: 5 days     Minutes of Exercise per Session: 20 min   Stress: Stress Concern Present (12/17/2024)    [place] [place] [employer] - Occupational Stress Questionnaire     Feeling of Stress : To some extent   Social Connections: Moderately Isolated (12/17/2024)    Social Connection and Isolation Panel [NHANES]     Frequency of Communication with Friends and Family: Once a week     Frequency of Social Gatherings with Friends and Family: Once a week     Attends Taoist Services: More than 4 times per year     Active Member of Clubs or Organizations: No     Attends Club or Organization Meetings: Never     Marital Status:    Intimate Partner Violence: Not on file   Housing Stability: Low Risk  (12/17/2024)    Housing Stability Vital Sign     Unable to Pay for Housing in the Last Year: No     Number of Times Moved in the Last Year: 0     Homeless in the Last Year: No     Family History   Problem Relation Age of Onset    Stroke Mother     Heart Disease Mother     Hypertension Mother     Cancer Sister         Breast cancer    Heart Disease Brother     Cancer Brother         Lung cancer    Lung Disease Brother     Heart Disease Brother     Sleep Apnea Son     Cancer Maternal Grandmother         Cervical  "cancer    Cancer Paternal Grandmother         Lung cancer    Heart Disease Brother     Heart Disease Brother     Stroke Brother          ROS:     Constitutional:  Negative for chills, fever, fatigue, weight loss.  HEENT:  Negative for blurred vision, hearing loss, sore throat.    Respiratory:  Negative for cough, sputum production and shortness of breath.  Cardiovascular:  Negative for chest pain, palpitations and leg swelling.  Gastrointestinal:  Negative for abdominal pain, blood in stool, constipation, diarrhea and vomiting.   Musculoskeletal:  Negative for falls.   Skin:  Negative for rash.   Neurological:  Negative for dizziness, seizures, weakness and headaches.   Endo/Heme/Allergies:  Does not bruise/bleed easily.   Psychiatric/Behavioral:  Negative for depression, anxiety and suicidal thoughts.      Objective:     Exam: BP (!) 142/68 (BP Location: Left arm, Patient Position: Sitting, BP Cuff Size: Adult)   Pulse (!) 53   Temp 36.6 °C (97.9 °F)   Ht 1.562 m (5' 1.5\")   Wt 53.5 kg (117 lb 15.1 oz)   SpO2 98%  Body mass index is 21.92 kg/m².    Gen: Alert and oriented, no acute distress.  Eyes:  PERRL, conjunctivae clear, lids normal.   ENMT: Lips without lesions, good dentition, moist mucous membranes.  Neck: Neck is supple, trachea middle, no thyromegaly.  Lungs: Normal effort, CTAB, no wheezing / rhonchi / rales.  CV: RRR, normal S1 and S2, no murmurs.  GI:  Abdomen soft, non-tender, non-distended with normal bowel sounds.  MSK:  Normal ROM.  Ext: No clubbing, cyanosis, or edema.  Skin:  Warm and dry with no rashes or lesions.  Neuro: AAO x 3, no acute focal deficits.  Psych: Normal affect and mood.      Assessment & Plan:   69 y.o. female with the following -    1. Encounter to establish care  Patient presents today to establish care.  Chart was reviewed and history was discussed in detail with the patient.  Previous labs reviewed.  UTD with colonoscopy and DEXA.  Mammogram ordered.  UTD with " vaccines.    2. Chronic sinusitis, unspecified location  Chronic, uncontrolled.  No improvement with Flonase.  She has tried a humidifier in the past.  History of turbinoplasty in the past.  We will try Atrovent nasal spray and she was given referral to ENT for further management.  - ipratropium (ATROVENT) 0.06 % Solution; Administer 2 Sprays into affected nostril(S) 4 times a day.  Dispense: 15 mL; Refill: 1  - Referral to ENT    3. Recurrent major depressive disorder, in partial remission (HCC)  Chronic, stable.  Continue Paxil 40 mg daily.  She does not wish to come off medication at this time.    4. Osteopenia of multiple sites  Chronic, uncontrolled.  September 2023 DEXA showed osteopenia in the hip and spine with 10-year probability of hip fracture 6.9% and major osteoporotic fracture 28.9%.  Treatment recommended and she is agreeable.  Prescribed Fosamax 70 mg weekly.  Side effects of medication were discussed.  - alendronate (FOSAMAX) 70 MG Tab; Take 1 Tablet by mouth every 7 days.  Dispense: 12 Tablet; Refill: 3    5. Tremor, essential  Chronic, stable.  Left worse than right.  She does not feel the need for medication at this time.    6. Atherosclerosis of aorta (HCC)  7. Hyperlipidemia, unspecified hyperlipidemia type  Chronic, controlled.  Lipid panel WNL.  Continue atorvastatin 40 mg daily.    8. Rosado's esophagus without dysplasia  9. Gastroesophageal reflux disease, unspecified whether esophagitis present  Chronic, controlled.  December 2022 EGD showed Rosado's esophagus and repeat in 3 years recommended.  Continue omeprazole 20 mg daily.    10. Essential hypertension, benign  Chronic, controlled.  /68 today.  Continue losartan 25 mg daily and verapamil ER to 40 mg daily.    11. Mild cerebral atrophy (HCC)  Chronic.  Seen on February 2023 MRI brain and nothing further needs to be done at this time.    12. History of ductal carcinoma in situ (DCIS) of breast  13. Encounter for screening  mammogram for malignant neoplasm of breast  Chronic, in remission.  Diagnosed in 2015 s/p lumpectomy and radiation.  Annual mammogram ordered.  - MA-SCREENING MAMMO BILAT W/TOMOSYNTHESIS W/CAD; Future    14. Need for vaccination  - INFLUENZA VACCINE, HIGH DOSE (65+ ONLY)  - Pneumococcal Conjugate Vaccine 20-Valent (6 wks+)        I spent a total of 50 minutes with record review, exam, communication with the patient, communication with other providers, and documentation of this encounter.    Return in about 2 months (around 2/17/2025) for Follow-up of chronic conditions, discuss hip pain.    Verbal consent was acquired by the patient to use [employer] ambient listening note generation during this visit: Yes.    Please note that this dictation was created using voice recognition software. I have made every reasonable attempt to correct obvious errors, but I expect that there are errors of grammar and possibly content that I did not discover before finalizing the note.

## 2024-12-26 ENCOUNTER — HOSPITAL ENCOUNTER (OUTPATIENT)
Dept: RADIOLOGY | Facility: MEDICAL CENTER | Age: 69
End: 2024-12-26
Attending: STUDENT IN AN ORGANIZED HEALTH CARE EDUCATION/TRAINING PROGRAM
Payer: MEDICARE

## 2024-12-26 DIAGNOSIS — Z12.31 ENCOUNTER FOR SCREENING MAMMOGRAM FOR MALIGNANT NEOPLASM OF BREAST: ICD-10-CM

## 2024-12-26 DIAGNOSIS — Z86.000 HISTORY OF DUCTAL CARCINOMA IN SITU (DCIS) OF BREAST: ICD-10-CM

## 2024-12-26 PROCEDURE — 77067 SCR MAMMO BI INCL CAD: CPT

## 2025-01-08 ENCOUNTER — TELEPHONE (OUTPATIENT)
Dept: MEDICAL GROUP | Facility: PHYSICIAN GROUP | Age: 70
End: 2025-01-08
Payer: MEDICARE

## 2025-01-08 NOTE — TELEPHONE ENCOUNTER
Referral needed for ENT> current Ent office is not taking patients this is the one she was referred to originally.      Lyndsey referral placed for   Ear nose and throat specialist on [facility] [id number]  Ph: [phone]      Please do not refer to [place] [facility] she has been here before.

## 2025-01-13 DIAGNOSIS — J32.9 CHRONIC SINUSITIS, UNSPECIFIED LOCATION: ICD-10-CM

## 2025-01-13 NOTE — PROGRESS NOTES
Lyndsey referral placed for   Ear nose and throat specialist on [facility] [id number]  Ph: [phone]

## 2025-02-03 ENCOUNTER — OFFICE VISIT (OUTPATIENT)
Dept: MEDICAL GROUP | Facility: PHYSICIAN GROUP | Age: 70
End: 2025-02-03
Payer: MEDICARE

## 2025-02-03 VITALS
OXYGEN SATURATION: 98 % | TEMPERATURE: 97.9 F | HEART RATE: 63 BPM | HEIGHT: 60 IN | BODY MASS INDEX: 22.16 KG/M2 | SYSTOLIC BLOOD PRESSURE: 120 MMHG | WEIGHT: 112.88 LBS | DIASTOLIC BLOOD PRESSURE: 56 MMHG

## 2025-02-03 DIAGNOSIS — M54.42 CHRONIC BILATERAL LOW BACK PAIN WITH BILATERAL SCIATICA: ICD-10-CM

## 2025-02-03 DIAGNOSIS — M54.41 CHRONIC BILATERAL LOW BACK PAIN WITH BILATERAL SCIATICA: ICD-10-CM

## 2025-02-03 DIAGNOSIS — Z02.89 ENCOUNTER FOR COMPLETION OF FORM WITH PATIENT: ICD-10-CM

## 2025-02-03 DIAGNOSIS — I10 ESSENTIAL HYPERTENSION, BENIGN: Chronic | ICD-10-CM

## 2025-02-03 DIAGNOSIS — R63.4 UNINTENTIONAL WEIGHT LOSS: ICD-10-CM

## 2025-02-03 DIAGNOSIS — G89.29 CHRONIC BILATERAL LOW BACK PAIN WITH BILATERAL SCIATICA: ICD-10-CM

## 2025-02-03 PROCEDURE — 99214 OFFICE O/P EST MOD 30 MIN: CPT | Performed by: STUDENT IN AN ORGANIZED HEALTH CARE EDUCATION/TRAINING PROGRAM

## 2025-02-03 PROCEDURE — 7101 PR PHYSICAL: Performed by: STUDENT IN AN ORGANIZED HEALTH CARE EDUCATION/TRAINING PROGRAM

## 2025-02-03 RX ORDER — METHOCARBAMOL 500 MG/1
500 TABLET, FILM COATED ORAL 3 TIMES DAILY PRN
Qty: 30 TABLET | Refills: 1 | Status: SHIPPED | OUTPATIENT
Start: 2025-02-03

## 2025-02-03 RX ORDER — ASPIRIN 81 MG/1
81 TABLET ORAL DAILY
COMMUNITY

## 2025-02-03 RX ORDER — MELOXICAM 7.5 MG/1
7.5-15 TABLET ORAL DAILY
Qty: 90 TABLET | Refills: 0 | Status: SHIPPED | OUTPATIENT
Start: 2025-02-03

## 2025-02-03 ASSESSMENT — FIBROSIS 4 INDEX: FIB4 SCORE: 2.22

## 2025-02-03 ASSESSMENT — PATIENT HEALTH QUESTIONNAIRE - PHQ9: CLINICAL INTERPRETATION OF PHQ2 SCORE: 0

## 2025-02-03 NOTE — PROGRESS NOTES
Subjective:     Chief Complaint   Patient presents with    Paperwork    Back Pain     Lower back pain          History of Present Illness  The patient presents for evaluation of back pain, unintentional weight loss, and paperwork for 's license renewal.    She has been experiencing persistent back pain, which she manages with aspirin 325 mg 6 times daily. She reports that the aspirin provides some relief, particularly in the morning hours, but it is not entirely effective. The pain is severe upon waking, preventing her from fully straightening up, and it radiates down into her thighs. She has not tried using a muscle relaxer at bedtime and is open to trying it. She has previously undergone physical therapy for her back pain and received injections in the past, which were ineffective. She has also had hip pain in the past and underwent an MRI of her hip in 2019, which did not mention significant osteoarthritis. She believes the pain is primarily originating from her back rather than her hips. Nov 2023 MRI lumbar spine showed mild multilevel lumbar spine degenerative changes, moderate left foraminal narrowing at L5-S1, Type I marrow changes at L2-L3 endplates.    She reports an unintentional weight loss, with her current weight being 112 pounds, a figure she has not seen since her teenage years. She has been trying to maintain her weight at 114 pounds. Her baseline weight before aging was approximately 120 pounds.    She is requesting completion of DMV paperwork today. She has already seen her eye doctor to renew her 's license. She mentions that the last time she received a ticket was about 15 years ago.       Health Maintenance: Completed    ROS:  Negative except as stated above.      Objective:     Exam:  /56   Pulse 63   Temp 36.6 °C (97.9 °F)   Ht 1.524 m (5')   Wt 51.2 kg (112 lb 14 oz)   SpO2 98%   BMI 22.04 kg/m²  Body mass index is 22.04 kg/m².    Physical Exam    Gen: Alert and  oriented, no acute distress.  Lungs: Normal effort, CTAB, no wheezing / rhonchi / rales.  CV: RRR, normal S1 and S2, no murmurs.      Assessment & Plan:     69 y.o. female with the following -     1. Chronic bilateral low back pain with bilateral sciatica  Chronic with acute exacerbation.  She saw LIRIANO in the past for injections, which did not provide much relief.  MRI findings above.  Risks associated with chronic NSAID use were discussed today.  Prescribed Robaxin 500 mg at bedtime as needed and Mobic 7.5-15 mg daily as needed until she is able to see Physiatry.  - methocarbamol (ROBAXIN) 500 MG Tab; Take 1 Tablet by mouth 3 times a day as needed (back pain).  Dispense: 30 Tablet; Refill: 1  - meloxicam (MOBIC) 7.5 MG Tab; Take 1-2 Tablets by mouth every day.  Dispense: 90 Tablet; Refill: 0  - Referral to Pain Clinic    2. Essential hypertension, benign  Chronic, controlled.  /56 today.  Continue losartan 25 mg daily and verapamil ER to 40 mg daily.    3. Unintentional weight loss  This is a new problem.  BMI 22 and weight 112 pounds today.  At her last visit she was 117 pounds.  History of breast cancer but December 2024 mammogram was negative.  She is also up-to-date with colon cancer screening.  Labs have not shown any signs of hematologic malignancy.  Continue to monitor.    4. Encounter for completion of form with patient  DMV paperwork completed today and scanned into the chart.        Return in about 8 weeks (around 3/31/2025) for Follow-up of chronic conditions.    Verbal consent was acquired by the patient to use TxtFeedback ambient listening note generation during this visit: Yes.    Please note that this dictation was created using voice recognition software. I have made every reasonable attempt to correct obvious errors, but I expect that there are errors of grammar and possibly content that I did not discover before finalizing the note.

## 2025-02-05 ENCOUNTER — OFFICE VISIT (OUTPATIENT)
Dept: PHYSICAL MEDICINE AND REHAB | Facility: MEDICAL CENTER | Age: 70
End: 2025-02-05
Payer: MEDICARE

## 2025-02-05 VITALS
SYSTOLIC BLOOD PRESSURE: 130 MMHG | DIASTOLIC BLOOD PRESSURE: 64 MMHG | HEART RATE: 64 BPM | WEIGHT: 118.61 LBS | OXYGEN SATURATION: 97 % | HEIGHT: 60 IN | BODY MASS INDEX: 23.29 KG/M2 | TEMPERATURE: 97.9 F

## 2025-02-05 DIAGNOSIS — M47.816 LUMBAR SPONDYLOSIS: ICD-10-CM

## 2025-02-05 DIAGNOSIS — Z91.81 RISK FOR FALLS: ICD-10-CM

## 2025-02-05 DIAGNOSIS — M48.061 NEUROFORAMINAL STENOSIS OF LUMBAR SPINE: ICD-10-CM

## 2025-02-05 DIAGNOSIS — R10.32 BILATERAL GROIN PAIN: ICD-10-CM

## 2025-02-05 DIAGNOSIS — R10.31 BILATERAL GROIN PAIN: ICD-10-CM

## 2025-02-05 DIAGNOSIS — G89.29 CHRONIC BILATERAL LOW BACK PAIN, UNSPECIFIED WHETHER SCIATICA PRESENT: ICD-10-CM

## 2025-02-05 DIAGNOSIS — M54.50 CHRONIC BILATERAL LOW BACK PAIN, UNSPECIFIED WHETHER SCIATICA PRESENT: ICD-10-CM

## 2025-02-05 DIAGNOSIS — S33.6XXA STRAIN OF SACROILIAC LIGAMENT, INITIAL ENCOUNTER: ICD-10-CM

## 2025-02-05 PROCEDURE — 99204 OFFICE O/P NEW MOD 45 MIN: CPT | Performed by: STUDENT IN AN ORGANIZED HEALTH CARE EDUCATION/TRAINING PROGRAM

## 2025-02-05 PROCEDURE — G2211 COMPLEX E/M VISIT ADD ON: HCPCS | Performed by: STUDENT IN AN ORGANIZED HEALTH CARE EDUCATION/TRAINING PROGRAM

## 2025-02-05 PROCEDURE — 1125F AMNT PAIN NOTED PAIN PRSNT: CPT | Performed by: STUDENT IN AN ORGANIZED HEALTH CARE EDUCATION/TRAINING PROGRAM

## 2025-02-05 PROCEDURE — 3075F SYST BP GE 130 - 139MM HG: CPT | Performed by: STUDENT IN AN ORGANIZED HEALTH CARE EDUCATION/TRAINING PROGRAM

## 2025-02-05 PROCEDURE — 3078F DIAST BP <80 MM HG: CPT | Performed by: STUDENT IN AN ORGANIZED HEALTH CARE EDUCATION/TRAINING PROGRAM

## 2025-02-05 ASSESSMENT — PAIN SCALES - GENERAL: PAINLEVEL_OUTOF10: 5=MODERATE PAIN

## 2025-02-05 ASSESSMENT — PATIENT HEALTH QUESTIONNAIRE - PHQ9
CLINICAL INTERPRETATION OF PHQ2 SCORE: 2
5. POOR APPETITE OR OVEREATING: 1 - SEVERAL DAYS
SUM OF ALL RESPONSES TO PHQ QUESTIONS 1-9: 5

## 2025-02-05 ASSESSMENT — FIBROSIS 4 INDEX: FIB4 SCORE: 2.22

## 2025-02-05 NOTE — Clinical Note
López Carrillo,  Thanks for referring Haley.  I think she is a great candidate for pain management interventions such as a possible diagnostic lumbar medial branch block, trigger point injections, and sacroiliac joint injections based on her imaging and her pain presentation.  I ordered some updated imaging and will continue to follow with her.  She declined a referral to physical therapy today, but I think it may be helpful in the future pending her MRI results.   Thanks, Soraya

## 2025-02-05 NOTE — PROGRESS NOTES
Verbal consent was acquired by the patient to use [employer] ambient listening note generation during this visit Yes     New Patient Note    Interventional Pain and Spine  Physiatry (Physical Medicine and Rehabilitation)     Patient Name: Haley Hawthorne  : 1955  Date of Service: 2025  PCP: Niru Carrillo M.D.  Referring Provider: Niru Carrillo M.D.    Chief Complaint:   Chief Complaint   Patient presents with    New Patient     Low back pain       HPI  HISTORY FROM INITIAL VISIT (2025):  History of Present Illness  Haley Hawthorne is a 69-year-old female who presents for a new patient visit.    She has been experiencing chronic bilateral back pain for several years, which she attributes to a fall on a board approximately 45 years ago. The pain has progressively worsened over time, transitioning from intermittent to constant. It originates in the lower back and radiates towards the groin bilaterally, occasionally extending down the thigh but never beyond the knees. She also reports a sense of achiness in her thighs. Despite these symptoms, she maintains an active lifestyle, walking a quarter mile daily to preserve leg strength. She experiences significant pain when rising from a seated position after prolonged periods, which gradually subsides with movement.    She has consulted her primary care physician, who prescribed methocarbamol and meloxicam. The former provides some relief, but the latter exacerbates her symptoms, particularly when taken at night. She takes methocarbamol before bedtime, which induces drowsiness, making daytime administration challenging. She has previously tried gabapentin and received injections from [facility] around 2018 or 2019, but found them ineffective.  These records are not available for my review at the time of today's visit.    She has not undergone water-based physical therapy. She has previously tried ibuprofen, Aleve, and  Celebrex, and recently took a generic brand of ibuprofen three times daily, which provides temporary relief. She has abstained from alcohol since Thanksgiving. She has undergone numerous tests and treatments, including medication, physical therapy, and cortisone injections, but these have only provided temporary relief.      Pain is 5/10 on NRS.    Psychological testing for pain as depression and pain commonly coexist and need to both be treated.     Opioid Risk Score: 5      Interpretation of Opioid Risk Score   Score 0-3 = Low risk of abuse. Do UDS at least once per year.  Score 4-7 = Moderate risk of abuse. Do UDS 1-4 times per year.  Score 8+ = High risk of abuse. Refer to specialist.    PHQ      10/8/2024     3:00 PM 2/3/2025    11:20 AM 2/5/2025     3:00 PM   Depression Screen (PHQ-2/PHQ-9)   PHQ-2 Total Score 1 0 2   PHQ-9 Total Score 3  5       Interpretation of PHQ-9 Total Score   Score Severity   1-4 No Depression   5-9 Mild Depression   10-14 Moderate Depression   15-19 Moderately Severe Depression   20-27 Severe Depression      Medical records review:  I reviewed the note from the referring provider Niru Carrillo M.D.     ROS:   Red Flags ROS:   Fever, Chills, Sweats: Denies  Involuntary Weight Loss: Denies  Bladder Incontinence: Denies  Bowel Incontinence: denies  Saddle Anesthesia: Denies    All other systems reviewed and negative.     PMHx:   Past Medical History:   Diagnosis Date    Acute postoperative abdominal pain 08/08/2019    Allergy     Appendicitis, acute 08/08/2019    Arrhythmia     sinus bradycardia    Arthritis     hands/ hip-rheumatoid    Asthma     Back pain     Rosado's esophagus     Blood transfusion without reported diagnosis     BRCA1 negative     BRCA2 negative     Breast cancer (HCC)     Bronchitis     years ago    CAD (coronary artery disease)     Cancer (HCC) 2015    dcis/ right breast, radiation    Cataract     Chickenpox     Depression     Foot drop, right 04/12/2022     GERD (gastroesophageal reflux disease)     Heart burn     Hiatus hernia syndrome     High cholesterol     Hypertension     Indigestion     Left hip pain 06/22/2022    Nasal drainage     Osteoporosis     Other chest pain 06/26/2019    Pain 06/2018    hands    Painful respiration 01/10/2022    Pneumonia 2016    Snoring     Tonsillitis        PSHx:   Past Surgical History:   Procedure Laterality Date    CA REPAIR OF NASAL SEPTUM N/A 05/28/2020    Procedure: SEPTOPLASTY, NOSE;  Surgeon: Arnulfo Salgado M.D.;  Location: SURGERY SAME DAY [facility];  Service: Ent    TURBINOPLASTY Bilateral 05/28/2020    Procedure: TURBINOPLASTY- SUBMUCOSAL APPROACH;  Surgeon: Arnulfo Salgado M.D.;  Location: SURGERY SAME DAY [facility];  Service: Ent    CA LAP,APPENDECTOMY  08/08/2019    Procedure: APPENDECTOMY, LAPAROSCOPIC;  Surgeon: John Cunningham M.D.;  Location: [facility];  Service: General    GANGLION EXCISION Left 09/25/2018    Procedure: GANGLION EXCISION-  CYST;  Surgeon: Hieu Salamanca M.D.;  Location: [facility];  Service: Orthopedics    FINGER ARTHROPLASTY Left 09/25/2018    Procedure: FINGER ARTHROPLASTY- CARPOMETACARPAL;  Surgeon: Hieu Salamanca M.D.;  Location: [facility];  Service: Orthopedics    REPAIR, TENDON, LOWER EXTREMITY, USING TENDON GRAFT Left 09/25/2018    Procedure: FLEXOR TENDON REPAIR-  CARPI RADIALIS;  Surgeon: Hieu Salamanca M.D.;  Location: [facility];  Service: Orthopedics    FINGER ARTHROPLASTY Right 06/05/2018    Procedure: FINGER ARTHROPLASTY - CARPOMETACARPAL;  Surgeon: Hieu Salamanca M.D.;  Location: [facility];  Service: Orthopedics    TENDON TRANSFER  06/05/2018    Procedure: TENDON TRANSFER - FLEXOR CARPI RADIALIS;  Surgeon: Hieu Salamanca M.D.;  Location: [facility];  Service: Orthopedics    PARTIAL MASTECTOMY  04/21/2015    Performed by Anna Licona M.D. at SURGERY SAME [facility]     LUMPECTOMY  2015    Performed by Anna Licona M.D. at SURGERY SAME DAY [facility]    CARPAL TUNNEL RELEASE  2013    HYSTERECTOMY LAPAROSCOPY  2006    ABDOMINAL EXPLORATION      BRCA1&2 GEN FULL SEQ DUP/DEL      BREAST BIOPSY  unknown    left benign biopsy    TONSILLECTOMY         Family Hx:   Family History   Problem Relation Age of Onset    Stroke Mother     Heart Disease Mother     Hypertension Mother     Cancer Sister         Breast cancer    Heart Disease Brother     Cancer Brother         Lung cancer    Lung Disease Brother     Heart Disease Brother     Sleep Apnea Son     Cancer Maternal Grandmother         Cervical cancer    Cancer Paternal Grandmother         Lung cancer    Heart Disease Brother     Heart Disease Brother     Stroke Brother        Social Hx:  Social History     Socioeconomic History    Marital status:      Spouse name: Not on file    Number of children: 3    Years of education: Not on file    Highest education level: GED or equivalent   Occupational History    Occupation: Retired     Employer: Retired   Tobacco Use    Smoking status: Former     Current packs/day: 0.00     Average packs/day: 1 pack/day for 10.0 years (10.0 ttl pk-yrs)     Types: Cigarettes     Start date: 1969     Quit date: 1979     Years since quittin.1    Smokeless tobacco: Never   Vaping Use    Vaping status: Never Used   Substance and Sexual Activity    Alcohol use: Yes     Comment: Wine 2 xs a week - quit all together    Drug use: No    Sexual activity: Not Currently     Partners: Male   Other Topics Concern    Not on file   Social History Narrative    Not on file     Social Drivers of Health     Financial Resource Strain: Low Risk  (2024)    Overall Financial Resource Strain (CARDIA)     Difficulty of Paying Living Expenses: Not very hard   Food Insecurity: No Food Insecurity (2024)    Hunger Vital Sign     Worried About Running Out of Food in the Last Year: Never true     Ran  Out of Food in the Last Year: Never true   Transportation Needs: No Transportation Needs (12/17/2024)    PRAPARE - Transportation     Lack of Transportation (Medical): No     Lack of Transportation (Non-Medical): No   Physical Activity: Insufficiently Active (12/17/2024)    Exercise Vital Sign     Days of Exercise per Week: 5 days     Minutes of Exercise per Session: 20 min   Stress: Stress Concern Present (12/17/2024)    [employer] [place] [employer] - Occupational Stress Questionnaire     Feeling of Stress : To some extent   Social Connections: Moderately Isolated (12/17/2024)    Social Connection and Isolation Panel [NHANES]     Frequency of Communication with Friends and Family: Once a week     Frequency of Social Gatherings with Friends and Family: Once a week     Attends Gnosticism Services: More than 4 times per year     Active Member of Clubs or Organizations: No     Attends Club or Organization Meetings: Never     Marital Status:    Intimate Partner Violence: Not on file   Housing Stability: Low Risk  (12/17/2024)    Housing Stability Vital Sign     Unable to Pay for Housing in the Last Year: No     Number of Times Moved in the Last Year: 0     Homeless in the Last Year: No       Allergies:  Allergies   Allergen Reactions    Sulfa Drugs Hives       Medications: reviewed on epic.   Outpatient Medications Marked as Taking for the 2/5/25 encounter (Office Visit) with Soraya Horne M.D.   Medication Sig Dispense Refill    aspirin 81 MG EC tablet Take 81 mg by mouth every day.      methocarbamol (ROBAXIN) 500 MG Tab Take 1 Tablet by mouth 3 times a day as needed (back pain). 30 Tablet 1    ipratropium (ATROVENT) 0.06 % Solution Administer 2 Sprays into affected nostril(S) 4 times a day. 15 mL 1    alendronate (FOSAMAX) 70 MG Tab Take 1 Tablet by mouth every 7 days. 12 Tablet 3    paroxetine (PAXIL) 40 MG tablet Take 40 mg by mouth every morning.      verapamil ER (CALAN-SR) 240 MG Tab CR TAKE  1 TABLET BY MOUTH EVERY DAY (Patient taking differently: Take 240 mg by mouth every morning.) 90 Tablet 3    atorvastatin (LIPITOR) 40 MG Tab Take 1 Tablet by mouth every day. (Patient taking differently: Take 40 mg by mouth every morning.) 90 Tablet 3    losartan (COZAAR) 25 MG Tab Take 1 Tablet by mouth every day. (Patient taking differently: Take 25 mg by mouth every morning.) 90 Tablet 3    fluticasone (FLOVENT HFA) 44 MCG/ACT Aerosol Inhale 2 Puffs 2 times a day as needed (Shortness of breath).      omeprazole (PRILOSEC) 20 MG CPDR Take 20 mg by mouth every morning.          Current Outpatient Medications on File Prior to Visit   Medication Sig Dispense Refill    aspirin 81 MG EC tablet Take 81 mg by mouth every day.      methocarbamol (ROBAXIN) 500 MG Tab Take 1 Tablet by mouth 3 times a day as needed (back pain). 30 Tablet 1    ipratropium (ATROVENT) 0.06 % Solution Administer 2 Sprays into affected nostril(S) 4 times a day. 15 mL 1    alendronate (FOSAMAX) 70 MG Tab Take 1 Tablet by mouth every 7 days. 12 Tablet 3    paroxetine (PAXIL) 40 MG tablet Take 40 mg by mouth every morning.      verapamil ER (CALAN-SR) 240 MG Tab CR TAKE 1 TABLET BY MOUTH EVERY DAY (Patient taking differently: Take 240 mg by mouth every morning.) 90 Tablet 3    atorvastatin (LIPITOR) 40 MG Tab Take 1 Tablet by mouth every day. (Patient taking differently: Take 40 mg by mouth every morning.) 90 Tablet 3    losartan (COZAAR) 25 MG Tab Take 1 Tablet by mouth every day. (Patient taking differently: Take 25 mg by mouth every morning.) 90 Tablet 3    fluticasone (FLOVENT HFA) 44 MCG/ACT Aerosol Inhale 2 Puffs 2 times a day as needed (Shortness of breath).      omeprazole (PRILOSEC) 20 MG CPDR Take 20 mg by mouth every morning.      meloxicam (MOBIC) 7.5 MG Tab Take 1-2 Tablets by mouth every day. (Patient not taking: Reported on 2/5/2025) 90 Tablet 0     No current facility-administered medications on file prior to visit.          EXAMINATION     Physical Exam:   /64 (BP Location: Right arm, Patient Position: Sitting, BP Cuff Size: Adult)   Pulse 64   Temp 36.6 °C (97.9 °F) (Temporal)   Ht 1.524 m (5')   Wt 53.8 kg (118 lb 9.7 oz)   SpO2 97%     Constitutional:   Body Habitus: Body mass index is 23.16 kg/m².  Cooperation: Fully cooperates with exam  Appearance: Well-groomed, well-nourished.    Eyes: No scleral icterus to suggest severe liver disease, no proptosis to suggest severe hyperthyroidism    ENT -no obvious auditory deficits, no noticeable facial droop     Skin -no rashes or lesions noted     Respiratory-  breathing comfortably on room air, no audible wheezing    Cardiovascular-distal extremities warm and well perfused.  No lower extremity edema is noted.     Gastrointestinal - no obvious abdominal masses, non-distended    Psychiatric- alert and oriented ×3. Normal affect.     Gait - normal gait, no use of ambulatory device, nonantalgic. Heel walking and toe walking intact.    Musculoskeletal and Neuro -     Thoracic/Lumbar Spine/Sacral Spine/Hips   Inspection: No evidence of atrophy in bilateral lower extremities throughout     Full active range of motion with lumbar extension    Facet loading maneuver positive on right, negative on left    Palpation:   Tenderness to palpation over the sacroiliac joints bilaterally.     Lumbar spine /hip provocative exam maneuvers  Straight leg raise negative bilaterally  FADIR test negative bilaterally    SI joint tests  ROSEMARY test negative bilaterally  Thigh thrust positive bilaterally  Sacral compression test, sacral distraction test, Gaenslen's negative bilaterally    Key points for the international standards for neurological classification of spinal cord injury (ISNCSCI) to light touch.   Dermatome R L   L2 2 2   L3 2 2   L4 2 2   L5 2 2   S1 2 2   S2 2 2       Motor Exam Lower Extremities  ? Myotome R L   Hip flexion L2 5 5   Knee extension L3 5 5   Ankle dorsiflexion L4 5 5    Toe extension L5 5 5   Ankle plantarflexion S1 5 5       Reflexes  ?  R L   Patella  3+ 3+   Achilles   2+ 2+     Clonus of the ankle negative bilaterally       MEDICAL DECISION MAKING    Medical records review: see under HPI section.     DATA    Labs: Personally reviewed at today's visit:     Lab Results   Component Value Date/Time    SODIUM 144 09/05/2024 08:40 AM    POTASSIUM 3.9 09/05/2024 08:40 AM    CHLORIDE 107 09/05/2024 08:40 AM    CO2 23 09/05/2024 08:40 AM    ANION 14.0 09/05/2024 08:40 AM    GLUCOSE 84 09/05/2024 08:40 AM    BUN 16 09/05/2024 08:40 AM    CREATININE 0.70 09/05/2024 08:40 AM    CALCIUM 9.3 09/05/2024 08:40 AM    ASTSGOT 28 09/05/2024 08:40 AM    ALTSGPT 23 09/05/2024 08:40 AM    TBILIRUBIN 0.7 09/05/2024 08:40 AM    ALBUMIN 4.4 09/05/2024 08:40 AM    TOTPROTEIN 6.7 09/05/2024 08:40 AM    GLOBULIN 2.3 09/05/2024 08:40 AM    AGRATIO 1.9 09/05/2024 08:40 AM       Lab Results   Component Value Date/Time    PROTHROMBTM 13.8 06/13/2023 01:46 PM    INR 1.07 06/13/2023 01:46 PM        Lab Results   Component Value Date/Time    WBC 4.4 (L) 09/05/2024 08:40 AM    RBC 4.29 09/05/2024 08:40 AM    HEMOGLOBIN 13.2 09/05/2024 08:40 AM    HEMATOCRIT 41.0 09/05/2024 08:40 AM    MCV 95.6 09/05/2024 08:40 AM    MCH 30.8 09/05/2024 08:40 AM    MCHC 32.2 09/05/2024 08:40 AM    MPV 10.7 09/05/2024 08:40 AM    NEUTSPOLYS 70.80 09/05/2024 08:40 AM    LYMPHOCYTES 19.90 (L) 09/05/2024 08:40 AM    MONOCYTES 7.50 09/05/2024 08:40 AM    EOSINOPHILS 1.40 09/05/2024 08:40 AM    BASOPHILS 0.20 09/05/2024 08:40 AM        Lab Results   Component Value Date/Time    HBA1C 5.5 04/19/2024 10:02 AM        Imaging:   I personally reviewed following images, these are my reads  MRI lumbar spine 11/14/2023  Modic type I changes at L2 and L3.  At L1-2 there is mild bilateral neuroforaminal stenosis.  At L2-3 there is mild bilateral neuroforaminal stenosis.  At L3-4 there is mild bilateral neuroforaminal stenosis.  At L4-5 there is  mild right neuroforaminal stenosis.  At L5-S1 there is moderate left and mild right inflow stenosis.  Facet arthropathy at multiple levels.  Disc degeneration at multiple levels.See formal radiology report for further details.          IMAGING radiology reads. I reviewed the following radiology reads       Results for orders placed during the hospital encounter of 02/03/23    MR-BRAIN-W/O    Impression  1.  There is no acute infarct.  2.  Mild chronic microvascular ischemic disease.  3.  Mild cerebral volume loss.             Results for orders placed during the hospital encounter of 11/20/07    MR-CERVICAL SPINE-W/O    Impression  IMPRESSION:    1. DEGENERATIVE DISC AND ARTICULAR PILLAR ARTHROPATHY, PARTICULARLY AT  C5-6 WHERE THERE IS MILD CANAL STENOSIS, MODERATE LEFT-SIDED FORAMINAL  STENOSIS, AND MILD-TO-MODERATE RIGHT-SIDED FORAMINAL STENOSIS.    2. MILD LATERAL VERTEBRAL SPURRING C3-4 WITH VERY MILD NEURAL FORAMINAL  COMPROMISE.    GEK/paxton      Read By ASIA PERRIN MD on Nov 21 2007  8:27AM  : PAXTON Transcription Date: Nov 21 2007 12:53PM  THIS DOCUMENT HAS BEEN ELECTRONICALLY SIGNED BY: ASIA PERRIN MD on  Nov 21 2007  4:36PM      Results for orders placed during the hospital encounter of 11/14/23    MR-LUMBAR SPINE-W/O    Impression  Mild multilevel lumbar spine degenerative changes as described above. There is moderate left foraminal narrowing at L5-S1.    Type I marrow changes are noted at the adjacent L2-L3 endplates. This could represent an independent source of back pain.        Results for orders placed during the hospital encounter of 11/14/23    MR-LUMBAR SPINE-W/O    Impression  Mild multilevel lumbar spine degenerative changes as described above. There is moderate left foraminal narrowing at L5-S1.    Type I marrow changes are noted at the adjacent L2-L3 endplates. This could represent an independent source of back pain.                                       Results for  orders placed during the hospital encounter of 11/06/07    DX-CERVICAL SPINE-2 OR 3 VIEWS    Impression  IMPRESSION:    1. MODERATE DEGENERATIVE DISC DISEASE IS PRESENT AT C5-6.    2. MINIMAL SPONDYLOLISTHESIS AT C7-T1.    3. NO ACUTE FRACTURE OR DISLOCATION IS IDENTIFIED ON THREE VIEWS OF THE  CERVICAL SPINE.    TRB/wds      Read By MEGAN ROBERTSON MD on Nov 6 2007  1:33PM  : JEREMY Transcription Date: Nov 6 2007  6:44PM  THIS DOCUMENT HAS BEEN ELECTRONICALLY SIGNED BY: MEGAN ROBERTSON MD on  Nov 7 2007  8:25AM     Results for orders placed during the hospital encounter of 01/20/21    DX-CHEST-2 VIEWS    Impression  No acute cardiopulmonary abnormality.            Results for orders placed during the hospital encounter of 05/19/04    DX-HAND 3+    Impression  IMPRESSION:      1.   NO ACUTE ABNORMALITIES OF THE RIGHT HAND.              READING DR:        MEGAN ROBERTSON MD  READING DATE:      May 19 2004  1:28PM  REPORT STATUS:     FINAL REPORT    TRANSCRIPTION CODE:         LVD  TRANSCRIPTION DATE:         May 20 2004  4:22AM    THIS DOCUMENT HAS BEEN ELECTRONICALLY SIGNED BY:  MEGAN ROBERTSON MD -  May 20 2004  8:28AM            Results for orders placed during the hospital encounter of 09/19/23    DX-LUMBAR SPINE-2 OR 3 VIEWS    Impression  Mild worsening moderate spondylosis   Results for orders placed during the hospital encounter of 10/31/23    DX-LUMBAR SPINE-4+ VIEWS    Impression  1.  Mild scoliosis.  2.  Moderate multilevel degenerative change of lumbar spine.  3.  No fracture or subluxation.  4.  No evidence of instability.                        Diagnosis  Visit Diagnoses     ICD-10-CM   1. Chronic bilateral low back pain, unspecified whether sciatica present  M54.50    G89.29   2. Lumbar spondylosis  M47.816   3. Neuroforaminal stenosis of lumbar spine  M48.061   4. Bilateral groin pain  R10.31    R10.32   5. Risk for falls  Z91.81   6. Strain of sacroiliac ligament, initial encounter   S33.6XXA         ASSESSMENT AND PLAN:  Haley Hawthorne ( 1955) is a female presenting with worsening chronic bilateral low back pain with right-sided low back pain reproduced with a positive right facet loading maneuver, and tenderness to palpation at bilateral sacroiliac joints with some positive provocative exam testing for sacroiliac joint dysfunction bilaterally.  Also with reported pain radiating from the low back to the bilateral groin.  No recent hip imaging available for my review at the time of today's visit.  Achiness in bilateral thighs with unclear etiology.      Haley was seen today for new patient.    Diagnoses and all orders for this visit:    Chronic bilateral low back pain, unspecified whether sciatica present  -     MR-LUMBAR SPINE-W/O; Future  -     MR-LUMBAR SPINE-W/O; Future    Lumbar spondylosis  -     MR-LUMBAR SPINE-W/O; Future  -     MR-LUMBAR SPINE-W/O; Future    Neuroforaminal stenosis of lumbar spine  -     MR-LUMBAR SPINE-W/O; Future  -     MR-LUMBAR SPINE-W/O; Future    Bilateral groin pain  -     DX-HIP-COMPLETE - UNILATERAL 2+ RIGHT; Future  -     DX-HIP-COMPLETE - UNILATERAL 2+ LEFT; Future    Risk for falls  -     Patient identified as fall risk.  Appropriate orders and counseling given.    Strain of sacroiliac ligament, initial encounter          PLAN  Physical Therapy: The patient has done at least 6 weeks of a provider driven physical therapy program for this problem in the past.  Advised patient to continue exercise program as able.  Consider due to physical therapy referral pending MRI results    Diagnostic workup: Personally reviewed at today's visit: MRI lumbar spine 2023.  Order updated MRI lumbar spine and x-ray hips    Medications:   -Okay to continue Robaxin as per PCP  - Okay to discontinue Mobic as the patient has done due to GI upset.  She reports that she previously took ibuprofen with relief.  I reviewed her latest GFR which had  significantly improved to  93 on 9/5/2024.  Discussed that I would recommend that if she decides to take ibuprofen that she take it less than daily if possible to minimize the risk  of developing  chronic kidney disease    Interventions:   -  pending imaging results.     Follow-up:   After imaging complete    Orders Placed This Encounter    MR-LUMBAR SPINE-W/O    MR-LUMBAR SPINE-W/O    DX-HIP-COMPLETE - UNILATERAL 2+ RIGHT    DX-HIP-COMPLETE - UNILATERAL 2+ LEFT    Patient identified as fall risk.  Appropriate orders and counseling given.       Soraya Horne MD  Interventional Pain and Spine  Physical Medicine and Rehabilitation  [facility]    Niru Dinh M.D.     The above note documents my personal evaluation of this patient. In addition, I have reviewed and confirmed with the patient and MA the supportive information documented in today's Patient Health Questionnaire and Office Note.     Please note that this dictation was created using voice recognition software. I have made every reasonable attempt to correct obvious errors, but I expect that there are errors of grammar and possibly content that I did not discover before finalizing the note.

## 2025-02-07 ENCOUNTER — HOSPITAL ENCOUNTER (OUTPATIENT)
Dept: RADIOLOGY | Facility: MEDICAL CENTER | Age: 70
End: 2025-02-07
Attending: STUDENT IN AN ORGANIZED HEALTH CARE EDUCATION/TRAINING PROGRAM
Payer: MEDICARE

## 2025-02-07 DIAGNOSIS — R10.32 BILATERAL GROIN PAIN: ICD-10-CM

## 2025-02-07 DIAGNOSIS — R10.31 BILATERAL GROIN PAIN: ICD-10-CM

## 2025-02-07 PROCEDURE — 73521 X-RAY EXAM HIPS BI 2 VIEWS: CPT

## 2025-02-25 ENCOUNTER — HOSPITAL ENCOUNTER (OUTPATIENT)
Dept: RADIOLOGY | Facility: MEDICAL CENTER | Age: 70
End: 2025-02-25
Attending: STUDENT IN AN ORGANIZED HEALTH CARE EDUCATION/TRAINING PROGRAM
Payer: MEDICARE

## 2025-02-25 DIAGNOSIS — G89.29 CHRONIC BILATERAL LOW BACK PAIN, UNSPECIFIED WHETHER SCIATICA PRESENT: ICD-10-CM

## 2025-02-25 DIAGNOSIS — M54.50 CHRONIC BILATERAL LOW BACK PAIN, UNSPECIFIED WHETHER SCIATICA PRESENT: ICD-10-CM

## 2025-02-25 DIAGNOSIS — M48.061 NEUROFORAMINAL STENOSIS OF LUMBAR SPINE: ICD-10-CM

## 2025-02-25 DIAGNOSIS — M47.816 LUMBAR SPONDYLOSIS: ICD-10-CM

## 2025-02-25 PROCEDURE — 72148 MRI LUMBAR SPINE W/O DYE: CPT

## 2025-03-04 ENCOUNTER — TELEPHONE (OUTPATIENT)
Dept: PHYSICAL MEDICINE AND REHAB | Facility: MEDICAL CENTER | Age: 70
End: 2025-03-04

## 2025-03-04 ENCOUNTER — OFFICE VISIT (OUTPATIENT)
Dept: PHYSICAL MEDICINE AND REHAB | Facility: MEDICAL CENTER | Age: 70
End: 2025-03-04
Payer: MEDICARE

## 2025-03-04 VITALS
DIASTOLIC BLOOD PRESSURE: 65 MMHG | HEART RATE: 59 BPM | SYSTOLIC BLOOD PRESSURE: 136 MMHG | WEIGHT: 117.95 LBS | OXYGEN SATURATION: 99 % | BODY MASS INDEX: 23.16 KG/M2 | HEIGHT: 60 IN | TEMPERATURE: 98 F

## 2025-03-04 DIAGNOSIS — M48.061 NEUROFORAMINAL STENOSIS OF LUMBAR SPINE: ICD-10-CM

## 2025-03-04 DIAGNOSIS — S33.6XXA STRAIN OF SACROILIAC LIGAMENT, INITIAL ENCOUNTER: ICD-10-CM

## 2025-03-04 DIAGNOSIS — R10.32 BILATERAL GROIN PAIN: ICD-10-CM

## 2025-03-04 DIAGNOSIS — Z13.31 POSITIVE DEPRESSION SCREENING: ICD-10-CM

## 2025-03-04 DIAGNOSIS — R10.31 BILATERAL GROIN PAIN: ICD-10-CM

## 2025-03-04 DIAGNOSIS — G89.29 CHRONIC BILATERAL LOW BACK PAIN, UNSPECIFIED WHETHER SCIATICA PRESENT: ICD-10-CM

## 2025-03-04 DIAGNOSIS — M47.816 LUMBAR SPONDYLOSIS: ICD-10-CM

## 2025-03-04 DIAGNOSIS — M53.3 SACROILIAC JOINT DYSFUNCTION OF RIGHT SIDE: ICD-10-CM

## 2025-03-04 DIAGNOSIS — M54.50 CHRONIC BILATERAL LOW BACK PAIN, UNSPECIFIED WHETHER SCIATICA PRESENT: ICD-10-CM

## 2025-03-04 PROCEDURE — 3078F DIAST BP <80 MM HG: CPT | Performed by: STUDENT IN AN ORGANIZED HEALTH CARE EDUCATION/TRAINING PROGRAM

## 2025-03-04 PROCEDURE — 3075F SYST BP GE 130 - 139MM HG: CPT | Performed by: STUDENT IN AN ORGANIZED HEALTH CARE EDUCATION/TRAINING PROGRAM

## 2025-03-04 PROCEDURE — 1125F AMNT PAIN NOTED PAIN PRSNT: CPT | Performed by: STUDENT IN AN ORGANIZED HEALTH CARE EDUCATION/TRAINING PROGRAM

## 2025-03-04 PROCEDURE — 99214 OFFICE O/P EST MOD 30 MIN: CPT | Performed by: STUDENT IN AN ORGANIZED HEALTH CARE EDUCATION/TRAINING PROGRAM

## 2025-03-04 ASSESSMENT — PATIENT HEALTH QUESTIONNAIRE - PHQ9
CLINICAL INTERPRETATION OF PHQ2 SCORE: 5
SUM OF ALL RESPONSES TO PHQ QUESTIONS 1-9: 11
5. POOR APPETITE OR OVEREATING: 2 - MORE THAN HALF THE DAYS

## 2025-03-04 ASSESSMENT — PAIN SCALES - GENERAL: PAINLEVEL_OUTOF10: 6=MODERATE PAIN

## 2025-03-04 ASSESSMENT — FIBROSIS 4 INDEX: FIB4 SCORE: 2.25

## 2025-03-04 NOTE — Clinical Note
[facility]  [address] [facility]  [place] [address]    [id number]    Haley Hawthorne  [address] [facility] [address]    March 4, 2025    Member Name: Haley Hawthorne   Member Number: [id number]   Reference Number: [id number]   Approved Services: Outpatient Surgery   Approved Service Dates: 03/04/2025 - 03/04/2026   Requesting Provider: Soraya Horne   Requested Provider: [facility]     Dear Haley Hawthorne:    The following medical service(s) requested by Soraya Horne have been approved:    Procedure Code Procedure Code Name Requested Quantity Approved Quantity Status   31602 (CPT®) TX INJECTION,SACROILIAC JOINT 1 1 Authorized   85447 (CPT®) TX FLUOROSCOPIC GUIDANCE NEEDLE PLACEMENT 1 1 Authorized   56261 (CPT®) TX MOD SED SAME PHYS/QHP INITIAL 15 MINS 5/> YRS 1 1 Authorized       Approved Quantity means the number of visits approved for medication treatments and/or medical services.    The services should be provided by [facility] no later than 03/04/2026. Please contact the provider listed below with any questions.     Provider Information:  [facility]  [phone]    Your plan benefit may require a deductible, co-payment or coinsurance for these services. This authorization does not guarantee [facility] will pay the claim for services that you receive. Payment by [facility] for these services is subject to the terms of your Evidence of Coverage, your eligibility at the time of service, and confirmation of benefit coverage.    For any questions or additional information, please contact Customer Service:    retirement [employer] Toll Free: [phone]  Sencha[employer] users dial: [phone]   Call Center Hours:  Oct 1 - Mar 31, Mon - Fri 7 AM to 8 PM PST  Oct 1 - Mar 31, Sat - Sun 8 AM to 8 PM PST  Apr 1 - Sep 30, Mon - Fri 7 AM to 8 PM PST   Office Hours: Mon - Fri 8 AM to 5 PM PST    E-mail: [email or web address]   Website:  [email or web address]      This information is available for free in other languages. Please contact Customer Service at the phone number above for more information. [facility] complies with applicable Federal civil rights laws and does not discriminate on the basis of race, color, national origin, age, disability or sex.    Sincerely,     Healthcare Utilization Management Department     Cc: [facility]   Soraya Horne    Multi-Language Insert  Multi- Services  English: We have free  services to answer any questions you may have about our health or drug plan.  To get an , just call us at [phone].  Someone who speaks English/Language can help you.  This is a free service.  Syriac: Tenemos servicios de intérprete sin costo alguno  para responder cualquier pregunta que pueda tener sobre nuestro plan de maryann o medicamentos. Para hablar con un intérprete, por favor llame al [phone]. Alguien que hable español le podrá ayudar. Radha es un servicio gratuito.  Chinese Mandarin: ?????????????????????????????? ???????????????? [phone]????????????????? ?????????  Chinese Cantonese: ?????????????????????????????? ????????????? [phone]???????????????????? ????????  Tagalog:  Dedrick ferguson serbisyo sa pagsasaling-wika brigitte masleandrot juice charlesumang mga renetta snyderyo hinggil sa georgianag gingerong terrigkevin o panggamot.  Brigitte wookina barbara moyer  [phone]. Lily Maza.  Tin fowler.  Ukrainian:  Nous proposons jennifer services gratuits d'interprétation pour répondre à toutes hilda questions relatives à notre régime de santé ou d'assurance-médicaments. Pour accéder au service d'interprétation, il vous suffit de nous appeler au [phone]. Un interlocuteur parlant Français pourra  vous aider. Ce service est gratuit.  Azeri:  Chad tôi có d?ch v? thông d?ch mi?n phí ð? tr? l?i các câu h?i v? chýõng s?c kh?e và chýõng trình thu?c men. N?u quí v? c?n thông d?ch viên raven g?i [phone] s? có nhân viên nói ti?ng Vi?t giúp ð? quí v?. Ðây là d?ch v? mi?n phí .  Irish:  Unser kostenloser Dolmetscherservice beantwortet Ihren Fragen zu unserem Gesundheits- und Arzneimittelplan. Unsere Dolmetscher erreichen Sie [phone]. Man danny Vuong sathya [facility]. Dieser Service ist [facility].  Polish:  ??? ?? ?? ?? ?? ??? ?? ??? ?? ???? ?? ?? ???? ???? ????. ?? ???? ????? ?? [phone] ??? ??? ????.  ???? ?? ???? ?? ?? ????. ? ???? ??? ?????.   Sierra Leonean: Åñëè ó âàñ âîçíèêíóò âîïðîñû îòíîñèòåëüíî ñòðàõîâîãî èëè ìåäèêàìåíòíîãî ïëàíà, âû ìîæåòå âîñïîëüçîâàòüñÿ íàøèìè áåñïëàòíûìè óñëóãàìè ïåðåâîä÷èêîâ. ×òîáû âîñïîëüçîâàòüñÿ óñëóãàìè ïåðåâîä÷èêà, ïîçâîíèòå íàì ïî òåëåôîíó [phone]. Âàì îêàæåò ïîìîùü ñîòðóäíèê, êîòîðûé ãîâîðèò ïî-póññêè. Äàííàÿ óñëóãà áåñïëàòíàÿ.  Korean: ÅääÇ äÞÏã ÎÏãÇÊ ÇáãÊÑÌã ÇáÝæÑí ÇáãÌÇäíÉ ááÅÌÇÈÉ Úä Ãí ÃÓÆáÉ ÊÊÚáÞ ÈÇáÕÍÉ Ãæ ÌÏæá ÇáÃÏæíÉ áÏíäÇ. ááÍÕæá Úáì ãÊÑÌã ÝæÑí¡ áíÓ Úáíß Óæì ÇáÇÊÕÇá ÈäÇ Úáì [phone] . ÓíÞæã ÔÎÕ ãÇ íÊÍÏË ÇáÚÑÈíÉ ÈãÓÇÚÏÊß. åÐå ÎÏãÉ ãÌÇäíÉ.  Shelley: ????? ????????? ?? ??? ?? ????? ?? ???? ??? ???? ???? ?? ?????? ?? ???? ???? ?? ??? ????? ??? ????? ???????? ?????? ?????? ???. ?? ???????? ??????? ???? ?? ???, ?? ???? [phone] ?? ??? ????. ??? ??????? ?? ?????? ????? ?? ???? ??? ?? ???? ??. ?? ?? ????? ???? ??.   Indonesian:  È disponibile un servizio di interpretariato gratuito per rispondere a eventuali domande sul nostro piano sanitario e farmaceutico. Per un interprete, contattare il lou [phone]. Un nostro incaricato jacinta parla Italianovi fornirà l'assistenza necessaria. È un servizio gratuito.  Portugués:  Dispomos de serviços de interpretação gratuitos para responder a qualquer questão que tenha acerca do nosso plano de  saúde ou de medicação. Para obter um intérprete, contacte-nos através do número [phone]. Irá encontrar alguém que fale o idioma  Português para o ajudar. Radha serviço é gratuito.  Uzbek Creole:  Nou genyen sèvis entèprèt gratis nabeel reponn tout kesyon ou ta genyen konsènan plan medikal oswa dwòg nou an.  Nabeel jwenn yon entèprèt, jis rele nou nan [phone]. Yon moun ki pale Kreyòl kapab concepción w.  Sa a se yon sèvis ki gratis.  Polish:  Umo¿liwiamy bezp³atne skorzystanie z us³ug t³umacza ustnego, który pomo¿e w uzyskaniu odpowiedzi na temat planu zdrowotnego lub dawgeneva puentes. Ashley skorzystaæ z pomocy t³umacza znaj¹cego nickolas ch¿y zadzwoniæ pod numer [phone]. Ta us³uga jest bezp³atna.  Maltese: ????? ??????? ????????????????????? ??????????????????????????????????[phone] ???????????????? ? ????????????????? ?????

## 2025-03-04 NOTE — PROGRESS NOTES
Verbal consent was acquired by the patient to use [employer] ambient listening note generation during this visit Yes     Follow-up patient Note    Interventional Pain and Spine  Physiatry (Physical Medicine and Rehabilitation)     Patient Name: Haley Hawthorne  : 1955  Date of service: 3/4/2025    Chief Complaint:   Chief Complaint   Patient presents with    Follow-Up     MRI Review       HISTORY  Please see new patient note by Dr. Horne for more details.     HPI  Today's visit   Haley Hawthorne ( 1955) is a female with Diagnoses of Sacroiliac joint dysfunction of right side, Lumbar spondylosis, Chronic bilateral low back pain, unspecified whether sciatica present, Neuroforaminal stenosis of lumbar spine, Positive depression screening, Bilateral groin pain, and Strain of sacroiliac ligament, initial encounter were pertinent to this visit.    History of Present Illness  The patient is a 70-year-old female who presents for evaluation of back pain.    She reports experiencing generalized back pain, with occasional exacerbations in specific areas. She describes the pain as cramping in nature, affecting both thighs, and notes an increase in severity.  Her pain is also severe at her right sacroiliac joint area.  She does not experience any numbness, tingling, or burning sensations. She believes her back pain may be related to her thigh pain, as they tend to worsen concurrently. She has previously undergone physical therapy and does not express a desire to repeat this treatment.  She has previously received injections under x-ray guidance, which provided long-term relief.  This was many years ago and she is not sure what exact injection was done.  She has not undergone nerve ablation. She expresses a desire for her back pain to resolve, noting that it has been a persistent issue that has worsened with age.    She also reports experiencing hot flashes, right hip pain, lower back pain,  frequent gas, abdominal squeezing, and constant fatigue. She has not discussed these symptoms with her primary care physician whom she last consulted on 02/03/2024 regarding her back issues. She has previously seen a gynecologist and underwent a hysterectomy. She attributes her symptoms to bowel issues rather than hormonal imbalances. She saw a gastroenterologist in 2023, who recommended a follow-up appointment due to abnormal bowel positioning. She experiences lower abdominal pain upon waking, which she does not believe is triggered by any specific factors. She is considering another colonoscopy.      Pain severity 6/10 currently  Pt denies new numbness, tingling, or weakness.      ROS:   Red Flags ROS:   Fever, Chills, Sweats: Denies  Involuntary Weight Loss: Denies  Bladder Incontinence: Denies  Bowel Incontinence: denies  Saddle Anesthesia: Denies    All other systems reviewed and negative.     PMHx:   Past Medical History:   Diagnosis Date    Acute postoperative abdominal pain 08/08/2019    Allergy     Appendicitis, acute 08/08/2019    Arrhythmia     sinus bradycardia    Arthritis     hands/ hip-rheumatoid    Asthma     Back pain     Rosado's esophagus     Blood transfusion without reported diagnosis     BRCA1 negative     BRCA2 negative     Breast cancer (HCC)     Bronchitis     years ago    CAD (coronary artery disease)     Cancer (HCC) 2015    dcis/ right breast, radiation    Cataract     Chickenpox     Depression     Foot drop, right 04/12/2022    GERD (gastroesophageal reflux disease)     Heart burn     Hiatus hernia syndrome     High cholesterol     Hypertension     Indigestion     Left hip pain 06/22/2022    Nasal drainage     Osteoporosis     Other chest pain 06/26/2019    Pain 06/2018    hands    Painful respiration 01/10/2022    Pneumonia 2016    Snoring     Tonsillitis        PSHx:   Past Surgical History:   Procedure Laterality Date    [place] REPAIR OF NASAL SEPTUM N/A 05/28/2020    Procedure:  SEPTOPLASTY, NOSE;  Surgeon: Arnulfo Salgado M.D.;  Location: SURGERY SAME DAY [facility];  Service: Ent    TURBINOPLASTY Bilateral 05/28/2020    Procedure: TURBINOPLASTY- SUBMUCOSAL APPROACH;  Surgeon: Arnulfo Salgado M.D.;  Location: SURGERY SAME DAY [facility];  Service: Ent    IN LAP,APPENDECTOMY  08/08/2019    Procedure: APPENDECTOMY, LAPAROSCOPIC;  Surgeon: John Cunningham M.D.;  Location: [facility];  Service: General    GANGLION EXCISION Left 09/25/2018    Procedure: GANGLION EXCISION-  CYST;  Surgeon: Hieu Salamanca M.D.;  Location: [facility];  Service: Orthopedics    FINGER ARTHROPLASTY Left 09/25/2018    Procedure: FINGER ARTHROPLASTY- CARPOMETACARPAL;  Surgeon: Hieu Salamanca M.D.;  Location: [facility];  Service: Orthopedics    REPAIR, TENDON, LOWER EXTREMITY, USING TENDON GRAFT Left 09/25/2018    Procedure: FLEXOR TENDON REPAIR-  CARPI RADIALIS;  Surgeon: Hieu Salamanca M.D.;  Location: [facility];  Service: Orthopedics    FINGER ARTHROPLASTY Right 06/05/2018    Procedure: FINGER ARTHROPLASTY - CARPOMETACARPAL;  Surgeon: Hieu Salamanca M.D.;  Location: [facility];  Service: Orthopedics    TENDON TRANSFER  06/05/2018    Procedure: TENDON TRANSFER - FLEXOR CARPI RADIALIS;  Surgeon: Hieu Salamanca M.D.;  Location: [facility];  Service: Orthopedics    PARTIAL MASTECTOMY  04/21/2015    Performed by Anna Licona M.D. at SURGERY SAME DAY [facility]    LUMPECTOMY  04/21/2015    Performed by Anna Licona M.D. at SURGERY SAME DAY [facility]    CARPAL TUNNEL RELEASE  2013    HYSTERECTOMY LAPAROSCOPY  2006    ABDOMINAL EXPLORATION      BRCA1&2 GEN FULL SEQ DUP/DEL      BREAST BIOPSY  unknown    left benign biopsy    TONSILLECTOMY         Family Hx:   Family History   Problem Relation Age of Onset    Stroke Mother     Heart Disease Mother     Hypertension Mother     Cancer Sister         Breast cancer     Heart Disease Brother     Cancer Brother         Lung cancer    Lung Disease Brother     Heart Disease Brother     Sleep Apnea Son     Cancer Maternal Grandmother         Cervical cancer    Cancer Paternal Grandmother         Lung cancer    Heart Disease Brother     Heart Disease Brother     Stroke Brother        Social Hx:  Social History     Socioeconomic History    Marital status:      Spouse name: Not on file    Number of children: 3    Years of education: Not on file    Highest education level: GED or equivalent   Occupational History    Occupation: Retired     Employer: Retired   Tobacco Use    Smoking status: Former     Current packs/day: 0.00     Average packs/day: 1 pack/day for 10.0 years (10.0 ttl pk-yrs)     Types: Cigarettes     Start date: 1969     Quit date: 1979     Years since quittin.2    Smokeless tobacco: Never   Vaping Use    Vaping status: Never Used   Substance and Sexual Activity    Alcohol use: Yes     Comment: Wine 2 xs a week - quit all together    Drug use: No    Sexual activity: Not Currently     Partners: Male   Other Topics Concern    Not on file   Social History Narrative    Not on file     Social Drivers of Health     Financial Resource Strain: Low Risk  (2024)    Overall Financial Resource Strain (CARDIA)     Difficulty of Paying Living Expenses: Not very hard   Food Insecurity: No Food Insecurity (2024)    Hunger Vital Sign     Worried About Running Out of Food in the Last Year: Never true     Ran Out of Food in the Last Year: Never true   Transportation Needs: No Transportation Needs (2024)    PRAPARE - Transportation     Lack of Transportation (Medical): No     Lack of Transportation (Non-Medical): No   Physical Activity: Insufficiently Active (2024)    Exercise Vital Sign     Days of Exercise per Week: 5 days     Minutes of Exercise per Session: 20 min   Stress: Stress Concern Present (2024)    [employer] [place] [employer]  [employer] - Occupational Stress Questionnaire     Feeling of Stress : To some extent   Social Connections: Moderately Isolated (12/17/2024)    Social Connection and Isolation Panel [NHANES]     Frequency of Communication with Friends and Family: Once a week     Frequency of Social Gatherings with Friends and Family: Once a week     Attends Sabianist Services: More than 4 times per year     Active Member of Clubs or Organizations: No     Attends Club or Organization Meetings: Never     Marital Status:    Intimate Partner Violence: Not on file   Housing Stability: Low Risk  (12/17/2024)    Housing Stability Vital Sign     Unable to Pay for Housing in the Last Year: No     Number of Times Moved in the Last Year: 0     Homeless in the Last Year: No       Allergies:  Allergies   Allergen Reactions    Sulfa Drugs Hives       Medications: reviewed on epic.   Outpatient Medications Marked as Taking for the 3/4/25 encounter (Office Visit) with Soraya Horne M.D.   Medication Sig Dispense Refill    aspirin 81 MG EC tablet Take 81 mg by mouth every day.      methocarbamol (ROBAXIN) 500 MG Tab Take 1 Tablet by mouth 3 times a day as needed (back pain). 30 Tablet 1    ipratropium (ATROVENT) 0.06 % Solution Administer 2 Sprays into affected nostril(S) 4 times a day. 15 mL 1    alendronate (FOSAMAX) 70 MG Tab Take 1 Tablet by mouth every 7 days. 12 Tablet 3    paroxetine (PAXIL) 40 MG tablet Take 40 mg by mouth every morning.      verapamil ER (CALAN-SR) 240 MG Tab CR TAKE 1 TABLET BY MOUTH EVERY DAY (Patient taking differently: Take 240 mg by mouth every morning.) 90 Tablet 3    atorvastatin (LIPITOR) 40 MG Tab Take 1 Tablet by mouth every day. (Patient taking differently: Take 40 mg by mouth every morning.) 90 Tablet 3    losartan (COZAAR) 25 MG Tab Take 1 Tablet by mouth every day. (Patient taking differently: Take 25 mg by mouth every morning.) 90 Tablet 3    fluticasone (FLOVENT HFA) 44 MCG/ACT Aerosol Inhale 2 Puffs 2  times a day as needed (Shortness of breath).      omeprazole (PRILOSEC) 20 MG CPDR Take 20 mg by mouth every morning.          Current Outpatient Medications on File Prior to Visit   Medication Sig Dispense Refill    aspirin 81 MG EC tablet Take 81 mg by mouth every day.      methocarbamol (ROBAXIN) 500 MG Tab Take 1 Tablet by mouth 3 times a day as needed (back pain). 30 Tablet 1    ipratropium (ATROVENT) 0.06 % Solution Administer 2 Sprays into affected nostril(S) 4 times a day. 15 mL 1    alendronate (FOSAMAX) 70 MG Tab Take 1 Tablet by mouth every 7 days. 12 Tablet 3    paroxetine (PAXIL) 40 MG tablet Take 40 mg by mouth every morning.      verapamil ER (CALAN-SR) 240 MG Tab CR TAKE 1 TABLET BY MOUTH EVERY DAY (Patient taking differently: Take 240 mg by mouth every morning.) 90 Tablet 3    atorvastatin (LIPITOR) 40 MG Tab Take 1 Tablet by mouth every day. (Patient taking differently: Take 40 mg by mouth every morning.) 90 Tablet 3    losartan (COZAAR) 25 MG Tab Take 1 Tablet by mouth every day. (Patient taking differently: Take 25 mg by mouth every morning.) 90 Tablet 3    fluticasone (FLOVENT HFA) 44 MCG/ACT Aerosol Inhale 2 Puffs 2 times a day as needed (Shortness of breath).      omeprazole (PRILOSEC) 20 MG CPDR Take 20 mg by mouth every morning.      meloxicam (MOBIC) 7.5 MG Tab Take 1-2 Tablets by mouth every day. (Patient not taking: Reported on 3/4/2025) 90 Tablet 0     No current facility-administered medications on file prior to visit.         EXAMINATION     Physical Exam:   /65 (BP Location: Right arm, Patient Position: Sitting, BP Cuff Size: Adult)   Pulse (!) 59   Temp 36.7 °C (98 °F) (Temporal)   Ht 1.524 m (5')   Wt 53.5 kg (117 lb 15.1 oz)   SpO2 99%     Constitutional:   Body Habitus: Body mass index is 23.03 kg/m².  Cooperation: Fully cooperates with exam  Appearance: Well-groomed, well-nourished.    Eyes: No scleral icterus to suggest severe liver disease, no proptosis to suggest  severe hyperthyroidism    ENT -no obvious auditory deficits, no noticeable facial droop     Skin -no rashes or lesions noted     Respiratory-  breathing comfortably on room air, no audible wheezing    Cardiovascular-distal extremities warm and well perfused.  No lower extremity edema is noted.     Gastrointestinal - no obvious abdominal masses, non-distended    Psychiatric- alert and oriented ×3. Normal affect.     Gait stable, steady    Thoracic/Lumbar Spine/Sacral Spine/Hips   Inspection: No evidence of atrophy in bilateral lower extremities throughout     Facet loading maneuver positive on right, negative on left    Palpation:   Tenderness to palpation over the paraspinal muscles bilaterally, lumbar facets bilaterally spanning approximately L1-L5 levels, and sacroiliac joint on right.     Lumbar spine /hip provocative exam maneuvers  Straight leg raise negative bilaterally  FADIR test negative bilaterally    SI joint tests  ROSEMARY test negative bilaterally    SI joint compression positive on right, negative on left  SI joint distraction negative bilaterally  Sacral thrust test positive on right, negative on left  Thigh thrust test negative bilaterally  Gaenslens maneuver positive on right, negative on left    Key points for the international standards for neurological classification of spinal cord injury (ISNCSCI) to light touch.   Dermatome R L   L2 2 2   L3 2 2   L4 2 2   L5 2 2   S1 2 2   S2 2 2       Motor Exam Lower Extremities  ? Myotome R L   Hip flexion L2 5 5   Knee extension L3 5 5   Ankle dorsiflexion L4 5 5   Toe extension L5 5 5   Ankle plantarflexion S1 5 5         MEDICAL DECISION MAKING    Medical records review: see under HPI section.     DATA    Labs: No new labs available for review since last visit.   Lab Results   Component Value Date/Time    SODIUM 144 09/05/2024 08:40 AM    POTASSIUM 3.9 09/05/2024 08:40 AM    CHLORIDE 107 09/05/2024 08:40 AM    CO2 23 09/05/2024 08:40 AM    ANION 14.0  09/05/2024 08:40 AM    GLUCOSE 84 09/05/2024 08:40 AM    BUN 16 09/05/2024 08:40 AM    CREATININE 0.70 09/05/2024 08:40 AM    CALCIUM 9.3 09/05/2024 08:40 AM    ASTSGOT 28 09/05/2024 08:40 AM    ALTSGPT 23 09/05/2024 08:40 AM    TBILIRUBIN 0.7 09/05/2024 08:40 AM    ALBUMIN 4.4 09/05/2024 08:40 AM    TOTPROTEIN 6.7 09/05/2024 08:40 AM    GLOBULIN 2.3 09/05/2024 08:40 AM    AGRATIO 1.9 09/05/2024 08:40 AM       Lab Results   Component Value Date/Time    PROTHROMBTM 13.8 06/13/2023 01:46 PM    INR 1.07 06/13/2023 01:46 PM        Lab Results   Component Value Date/Time    WBC 4.4 (L) 09/05/2024 08:40 AM    RBC 4.29 09/05/2024 08:40 AM    HEMOGLOBIN 13.2 09/05/2024 08:40 AM    HEMATOCRIT 41.0 09/05/2024 08:40 AM    MCV 95.6 09/05/2024 08:40 AM    MCH 30.8 09/05/2024 08:40 AM    MCHC 32.2 09/05/2024 08:40 AM    MPV 10.7 09/05/2024 08:40 AM    NEUTSPOLYS 70.80 09/05/2024 08:40 AM    LYMPHOCYTES 19.90 (L) 09/05/2024 08:40 AM    MONOCYTES 7.50 09/05/2024 08:40 AM    EOSINOPHILS 1.40 09/05/2024 08:40 AM    BASOPHILS 0.20 09/05/2024 08:40 AM        Lab Results   Component Value Date/Time    HBA1C 5.5 04/19/2024 10:02 AM        Imaging:   I personally reviewed following images, these are my reads  MRI lumbar spine 2/25/2025  L2-3 there is mild canal narrowing.  At L3-4 there is mild bilateral neuroforaminal stenosis.  At L5-S1 there is moderate left neuroforaminal stenosis.  Facet arthropathy at bilateral lower lumbar levels. See formal radiology report for further details.      X-ray hip 2/7/2025  Mild OA of bilateral hips    IMAGING radiology reads. I reviewed the following radiology reads       Results for orders placed during the hospital encounter of 02/03/23    MR-BRAIN-W/O    Impression  1.  There is no acute infarct.  2.  Mild chronic microvascular ischemic disease.  3.  Mild cerebral volume loss.             Results for orders placed during the hospital encounter of 11/20/07    MR-CERVICAL  SPINE-W/O    Impression  IMPRESSION:    1. DEGENERATIVE DISC AND ARTICULAR PILLAR ARTHROPATHY, PARTICULARLY AT  C5-6 WHERE THERE IS MILD CANAL STENOSIS, MODERATE LEFT-SIDED FORAMINAL  STENOSIS, AND MILD-TO-MODERATE RIGHT-SIDED FORAMINAL STENOSIS.    2. MILD LATERAL VERTEBRAL SPURRING C3-4 WITH VERY MILD NEURAL FORAMINAL  COMPROMISE.    GEK/josh      Read By ASIA PERRIN MD on Nov 21 2007  8:27AM  : JOSH Transcription Date: Nov 21 2007 12:53PM  THIS DOCUMENT HAS BEEN ELECTRONICALLY SIGNED BY: ASIA PERRIN MD on  Nov 21 2007  4:36PM      Results for orders placed during the hospital encounter of 02/25/25    MR-LUMBAR SPINE-W/O    Impression  Moderate left foraminal narrowing at L5-S1, stable.    There is no significant spinal canal stenosis.        Results for orders placed during the hospital encounter of 02/25/25    MR-LUMBAR SPINE-W/O    Impression  Moderate left foraminal narrowing at L5-S1, stable.    There is no significant spinal canal stenosis.                                       Results for orders placed during the hospital encounter of 11/06/07    DX-CERVICAL SPINE-2 OR 3 VIEWS    Impression  IMPRESSION:    1. MODERATE DEGENERATIVE DISC DISEASE IS PRESENT AT C5-6.    2. MINIMAL SPONDYLOLISTHESIS AT C7-T1.    3. NO ACUTE FRACTURE OR DISLOCATION IS IDENTIFIED ON THREE VIEWS OF THE  CERVICAL SPINE.    TRB/wds      Read By MEGAN ROBERTSON MD on Nov 6 2007  1:33PM  : JEREMY Transcription Date: Nov 6 2007  6:44PM  THIS DOCUMENT HAS BEEN ELECTRONICALLY SIGNED BY: MEGAN ROBERTSON MD on  Nov 7 2007  8:25AM     Results for orders placed during the hospital encounter of 01/20/21    DX-CHEST-2 VIEWS    Impression  No acute cardiopulmonary abnormality.            Results for orders placed during the hospital encounter of 05/19/04    DX-HAND 3+    Impression  IMPRESSION:      1.   NO ACUTE ABNORMALITIES OF THE RIGHT HAND.              READING DR:        MEGAN ROBERTSON  MD  READING DATE:      May 19 2004  1:28PM  REPORT STATUS:     FINAL REPORT    TRANSCRIPTION CODE:         LVD  TRANSCRIPTION DATE:         May 20 2004  4:22AM    THIS DOCUMENT HAS BEEN ELECTRONICALLY SIGNED BY:  MEGAN ROBERTSON MD -  May 20 2004  8:28AM            Results for orders placed during the hospital encounter of 23    DX-LUMBAR SPINE-2 OR 3 VIEWS    Impression  Mild worsening moderate spondylosis   Results for orders placed during the hospital encounter of 10/31/23    DX-LUMBAR SPINE-4+ VIEWS    Impression  1.  Mild scoliosis.  2.  Moderate multilevel degenerative change of lumbar spine.  3.  No fracture or subluxation.  4.  No evidence of instability.                        Diagnosis  Visit Diagnoses     ICD-10-CM   1. Sacroiliac joint dysfunction of right side  M53.3   2. Lumbar spondylosis  M47.816   3. Chronic bilateral low back pain, unspecified whether sciatica present  M54.50    G89.29   4. Neuroforaminal stenosis of lumbar spine  M48.061   5. Positive depression screening  Z13.31   6. Bilateral groin pain  R10.31    R10.32   7. Strain of sacroiliac ligament, initial encounter  S33.6XXA         ASSESSMENT AND PLAN:  Haley Hawthorne (: 1955) is a female with      Haley was seen today for follow-up.    Diagnoses and all orders for this visit:    Sacroiliac joint dysfunction of right side  -     Cancel: Referral to Pain Clinic  -     Cancel: Referral to Pain Clinic  -     Referral to Pain Clinic    Lumbar spondylosis    Chronic bilateral low back pain, unspecified whether sciatica present    Neuroforaminal stenosis of lumbar spine    Positive depression screening  -     Patient has been identified as having a positive depression screening. Appropriate orders and counseling have been given.    Bilateral groin pain    Strain of sacroiliac ligament, initial encounter            PLAN  Physical Therapy: The patient has done at least 6 weeks of a provider driven physical therapy  program for this problem in the past.  Advised patient to continue exercise program as able.  Discussed referral to physical therapy which the patient declined today     Diagnostic workup: Personally reviewed at today's visit:   MRI lumbar spine 2/25/2025, X-ray hip 2/7/2025     Medications:   -Okay to continue Robaxin as per PCP  - Okay to discontinue Mobic as the patient has done due to GI upset.  She reports that she previously took ibuprofen with relief.  I reviewed her latest GFR which had significantly improved to  93 on 9/5/2024.  Discussed that I would recommend that if she decides to take ibuprofen that she take it less than daily if possible to minimize the risk  of developing  chronic kidney disease     Interventions:   -Right sacroiliac joint injection under fluoroscopic guidance.  The patient prefers to have this with sedation given her anticipation of difficulty tolerating injections without sedation. The risks, benefits, and alternatives to this procedure were discussed and the patient wishes to proceed with the procedure. Risks include but are not limited to damage to surrounding structures, infection, bleeding, worsening of pain which can be permanent, and weakness which can be permanent. Benefits include pain relief and improved function. Alternatives include not doing the procedure.    -Discussed that at this time there is not significant obvious nerve impingement on her MRI lumbar spine that would explain her pain in her thighs  -Discussed diagnostic lumbar medial branch blocks targeting Right L4-L5 and L5-S1 facet joints, defer for now in favor of right sacroiliac joint injection first    Follow-up: 3 weeks after sacroiliac joint injection    Orders Placed This Encounter    Referral to Pain Clinic    Patient has been identified as having a positive depression screening. Appropriate orders and counseling have been given.       Soraya Horne MD  Interventional Pain and Spine  Physical Medicine and  [facility]      The above note documents my personal evaluation of this patient. In addition, I have reviewed and confirmed with the patient and MA the supportive information documented in today's Patient Health Questionnaire and Office Note.     Please note that this dictation was created using voice recognition software. I have made every reasonable attempt to correct obvious errors, but I expect that there are errors of grammar and possibly content that I did not discover before finalizing the note.

## 2025-03-04 NOTE — TELEPHONE ENCOUNTER
Caller Name: lauro De Leon  Call Back Number:      How would the patient prefer to be contacted with a response: Phone call OK to leave a detailed message    Pt called to indicate that she would like to use sedation at her upcoming procedure with Dr. Horne. She said she and Dr. Horne had discussed it already.

## 2025-03-04 NOTE — Clinical Note
REFERRAL APPROVAL NOTICE         Sent on March 4, 2025                   Haley Hawthorne  [address] [place] [address]                   Dear Ms. Kathryn Hawthorne,    After a careful review of the medical information and benefit coverage, [facility] has processed your referral. See below for additional details.    If applicable, you must be actively enrolled with your insurance for coverage of the authorized service. If you have any questions regarding your coverage, please contact your insurance directly.    REFERRAL INFORMATION   Referral #:  [id number]  Referred-To Department    Referred-By Provider:  Physical Medicine and Rehab    Soraya Horne M.D.   Physiatry [facility]      [address]  [place] [id number] [phone] [address]  [place] [id number] [phone]    Referral Start Date:  03/04/2025  Referral End Date:   03/04/2026             SCHEDULING  If you do not already have an appointment, please call [phone] to make an appointment.     MORE INFORMATION  If you do not already have a [employer] account, sign up at: [email or web address]  You can access your medical information, make appointments, see lab results, billing information, and more.  If you have questions regarding this referral, please contact  the [facility] Referrals department at:             [phone]. Monday - Friday 8:00AM - 5:00PM.     Sincerely,    [facility]

## 2025-03-04 NOTE — H&P (VIEW-ONLY)
Verbal consent was acquired by the patient to use [employer] ambient listening note generation during this visit Yes     Follow-up patient Note    Interventional Pain and Spine  Physiatry (Physical Medicine and Rehabilitation)     Patient Name: Haley Hawthorne  : 1955  Date of service: 3/4/2025    Chief Complaint:   Chief Complaint   Patient presents with    Follow-Up     MRI Review       HISTORY  Please see new patient note by Dr. Horne for more details.     HPI  Today's visit   Haley Hawthorne ( 1955) is a female with Diagnoses of Sacroiliac joint dysfunction of right side, Lumbar spondylosis, Chronic bilateral low back pain, unspecified whether sciatica present, Neuroforaminal stenosis of lumbar spine, Positive depression screening, Bilateral groin pain, and Strain of sacroiliac ligament, initial encounter were pertinent to this visit.    History of Present Illness  The patient is a 70-year-old female who presents for evaluation of back pain.    She reports experiencing generalized back pain, with occasional exacerbations in specific areas. She describes the pain as cramping in nature, affecting both thighs, and notes an increase in severity.  Her pain is also severe at her right sacroiliac joint area.  She does not experience any numbness, tingling, or burning sensations. She believes her back pain may be related to her thigh pain, as they tend to worsen concurrently. She has previously undergone physical therapy and does not express a desire to repeat this treatment.  She has previously received injections under x-ray guidance, which provided long-term relief.  This was many years ago and she is not sure what exact injection was done.  She has not undergone nerve ablation. She expresses a desire for her back pain to resolve, noting that it has been a persistent issue that has worsened with age.    She also reports experiencing hot flashes, right hip pain, lower back pain,  frequent gas, abdominal squeezing, and constant fatigue. She has not discussed these symptoms with her primary care physician whom she last consulted on 02/03/2024 regarding her back issues. She has previously seen a gynecologist and underwent a hysterectomy. She attributes her symptoms to bowel issues rather than hormonal imbalances. She saw a gastroenterologist in 2023, who recommended a follow-up appointment due to abnormal bowel positioning. She experiences lower abdominal pain upon waking, which she does not believe is triggered by any specific factors. She is considering another colonoscopy.      Pain severity 6/10 currently  Pt denies new numbness, tingling, or weakness.      ROS:   Red Flags ROS:   Fever, Chills, Sweats: Denies  Involuntary Weight Loss: Denies  Bladder Incontinence: Denies  Bowel Incontinence: denies  Saddle Anesthesia: Denies    All other systems reviewed and negative.     PMHx:   Past Medical History:   Diagnosis Date    Acute postoperative abdominal pain 08/08/2019    Allergy     Appendicitis, acute 08/08/2019    Arrhythmia     sinus bradycardia    Arthritis     hands/ hip-rheumatoid    Asthma     Back pain     Rosado's esophagus     Blood transfusion without reported diagnosis     BRCA1 negative     BRCA2 negative     Breast cancer (HCC)     Bronchitis     years ago    CAD (coronary artery disease)     Cancer (HCC) 2015    dcis/ right breast, radiation    Cataract     Chickenpox     Depression     Foot drop, right 04/12/2022    GERD (gastroesophageal reflux disease)     Heart burn     Hiatus hernia syndrome     High cholesterol     Hypertension     Indigestion     Left hip pain 06/22/2022    Nasal drainage     Osteoporosis     Other chest pain 06/26/2019    Pain 06/2018    hands    Painful respiration 01/10/2022    Pneumonia 2016    Snoring     Tonsillitis        PSHx:   Past Surgical History:   Procedure Laterality Date    [place] REPAIR OF NASAL SEPTUM N/A 05/28/2020    Procedure:  SEPTOPLASTY, NOSE;  Surgeon: Arnulfo Salgado M.D.;  Location: SURGERY SAME DAY [facility];  Service: Ent    TURBINOPLASTY Bilateral 05/28/2020    Procedure: TURBINOPLASTY- SUBMUCOSAL APPROACH;  Surgeon: Arnulfo Salgado M.D.;  Location: SURGERY SAME DAY [facility];  Service: Ent    WI LAP,APPENDECTOMY  08/08/2019    Procedure: APPENDECTOMY, LAPAROSCOPIC;  Surgeon: John Cunningham M.D.;  Location: [facility];  Service: General    GANGLION EXCISION Left 09/25/2018    Procedure: GANGLION EXCISION-  CYST;  Surgeon: Hieu Salamanca M.D.;  Location: [facility];  Service: Orthopedics    FINGER ARTHROPLASTY Left 09/25/2018    Procedure: FINGER ARTHROPLASTY- CARPOMETACARPAL;  Surgeon: Hieu Salamanca M.D.;  Location: [facility];  Service: Orthopedics    REPAIR, TENDON, LOWER EXTREMITY, USING TENDON GRAFT Left 09/25/2018    Procedure: FLEXOR TENDON REPAIR-  CARPI RADIALIS;  Surgeon: Hieu Salamanca M.D.;  Location: [facility];  Service: Orthopedics    FINGER ARTHROPLASTY Right 06/05/2018    Procedure: FINGER ARTHROPLASTY - CARPOMETACARPAL;  Surgeon: Hieu Salamanca M.D.;  Location: [facility];  Service: Orthopedics    TENDON TRANSFER  06/05/2018    Procedure: TENDON TRANSFER - FLEXOR CARPI RADIALIS;  Surgeon: Hieu Salamanca M.D.;  Location: [facility];  Service: Orthopedics    PARTIAL MASTECTOMY  04/21/2015    Performed by Anna Licona M.D. at SURGERY SAME DAY [facility]    LUMPECTOMY  04/21/2015    Performed by Anna Licona M.D. at SURGERY SAME DAY [facility]    CARPAL TUNNEL RELEASE  2013    HYSTERECTOMY LAPAROSCOPY  2006    ABDOMINAL EXPLORATION      BRCA1&2 GEN FULL SEQ DUP/DEL      BREAST BIOPSY  unknown    left benign biopsy    TONSILLECTOMY         Family Hx:   Family History   Problem Relation Age of Onset    Stroke Mother     Heart Disease Mother     Hypertension Mother     Cancer Sister         Breast cancer     Heart Disease Brother     Cancer Brother         Lung cancer    Lung Disease Brother     Heart Disease Brother     Sleep Apnea Son     Cancer Maternal Grandmother         Cervical cancer    Cancer Paternal Grandmother         Lung cancer    Heart Disease Brother     Heart Disease Brother     Stroke Brother        Social Hx:  Social History     Socioeconomic History    Marital status:      Spouse name: Not on file    Number of children: 3    Years of education: Not on file    Highest education level: GED or equivalent   Occupational History    Occupation: Retired     Employer: Retired   Tobacco Use    Smoking status: Former     Current packs/day: 0.00     Average packs/day: 1 pack/day for 10.0 years (10.0 ttl pk-yrs)     Types: Cigarettes     Start date: 1969     Quit date: 1979     Years since quittin.2    Smokeless tobacco: Never   Vaping Use    Vaping status: Never Used   Substance and Sexual Activity    Alcohol use: Yes     Comment: Wine 2 xs a week - quit all together    Drug use: No    Sexual activity: Not Currently     Partners: Male   Other Topics Concern    Not on file   Social History Narrative    Not on file     Social Drivers of Health     Financial Resource Strain: Low Risk  (2024)    Overall Financial Resource Strain (CARDIA)     Difficulty of Paying Living Expenses: Not very hard   Food Insecurity: No Food Insecurity (2024)    Hunger Vital Sign     Worried About Running Out of Food in the Last Year: Never true     Ran Out of Food in the Last Year: Never true   Transportation Needs: No Transportation Needs (2024)    PRAPARE - Transportation     Lack of Transportation (Medical): No     Lack of Transportation (Non-Medical): No   Physical Activity: Insufficiently Active (2024)    Exercise Vital Sign     Days of Exercise per Week: 5 days     Minutes of Exercise per Session: 20 min   Stress: Stress Concern Present (2024)    [employer] [employer] - Occupational Stress Questionnaire     Feeling of Stress : To some extent   Social Connections: Moderately Isolated (12/17/2024)    Social Connection and Isolation Panel [NHANES]     Frequency of Communication with Friends and Family: Once a week     Frequency of Social Gatherings with Friends and Family: Once a week     Attends Zoroastrian Services: More than 4 times per year     Active Member of Clubs or Organizations: No     Attends Club or Organization Meetings: Never     Marital Status:    Intimate Partner Violence: Not on file   Housing Stability: Low Risk  (12/17/2024)    Housing Stability Vital Sign     Unable to Pay for Housing in the Last Year: No     Number of Times Moved in the Last Year: 0     Homeless in the Last Year: No       Allergies:  Allergies   Allergen Reactions    Sulfa Drugs Hives       Medications: reviewed on epic.   Outpatient Medications Marked as Taking for the 3/4/25 encounter (Office Visit) with Soraya Horne M.D.   Medication Sig Dispense Refill    aspirin 81 MG EC tablet Take 81 mg by mouth every day.      methocarbamol (ROBAXIN) 500 MG Tab Take 1 Tablet by mouth 3 times a day as needed (back pain). 30 Tablet 1    ipratropium (ATROVENT) 0.06 % Solution Administer 2 Sprays into affected nostril(S) 4 times a day. 15 mL 1    alendronate (FOSAMAX) 70 MG Tab Take 1 Tablet by mouth every 7 days. 12 Tablet 3    paroxetine (PAXIL) 40 MG tablet Take 40 mg by mouth every morning.      verapamil ER (CALAN-SR) 240 MG Tab CR TAKE 1 TABLET BY MOUTH EVERY DAY (Patient taking differently: Take 240 mg by mouth every morning.) 90 Tablet 3    atorvastatin (LIPITOR) 40 MG Tab Take 1 Tablet by mouth every day. (Patient taking differently: Take 40 mg by mouth every morning.) 90 Tablet 3    losartan (COZAAR) 25 MG Tab Take 1 Tablet by mouth every day. (Patient taking differently: Take 25 mg by mouth every morning.) 90 Tablet 3    fluticasone (FLOVENT HFA) 44 MCG/ACT Aerosol Inhale 2 Puffs 2  times a day as needed (Shortness of breath).      omeprazole (PRILOSEC) 20 MG CPDR Take 20 mg by mouth every morning.          Current Outpatient Medications on File Prior to Visit   Medication Sig Dispense Refill    aspirin 81 MG EC tablet Take 81 mg by mouth every day.      methocarbamol (ROBAXIN) 500 MG Tab Take 1 Tablet by mouth 3 times a day as needed (back pain). 30 Tablet 1    ipratropium (ATROVENT) 0.06 % Solution Administer 2 Sprays into affected nostril(S) 4 times a day. 15 mL 1    alendronate (FOSAMAX) 70 MG Tab Take 1 Tablet by mouth every 7 days. 12 Tablet 3    paroxetine (PAXIL) 40 MG tablet Take 40 mg by mouth every morning.      verapamil ER (CALAN-SR) 240 MG Tab CR TAKE 1 TABLET BY MOUTH EVERY DAY (Patient taking differently: Take 240 mg by mouth every morning.) 90 Tablet 3    atorvastatin (LIPITOR) 40 MG Tab Take 1 Tablet by mouth every day. (Patient taking differently: Take 40 mg by mouth every morning.) 90 Tablet 3    losartan (COZAAR) 25 MG Tab Take 1 Tablet by mouth every day. (Patient taking differently: Take 25 mg by mouth every morning.) 90 Tablet 3    fluticasone (FLOVENT HFA) 44 MCG/ACT Aerosol Inhale 2 Puffs 2 times a day as needed (Shortness of breath).      omeprazole (PRILOSEC) 20 MG CPDR Take 20 mg by mouth every morning.      meloxicam (MOBIC) 7.5 MG Tab Take 1-2 Tablets by mouth every day. (Patient not taking: Reported on 3/4/2025) 90 Tablet 0     No current facility-administered medications on file prior to visit.         EXAMINATION     Physical Exam:   /65 (BP Location: Right arm, Patient Position: Sitting, BP Cuff Size: Adult)   Pulse (!) 59   Temp 36.7 °C (98 °F) (Temporal)   Ht 1.524 m (5')   Wt 53.5 kg (117 lb 15.1 oz)   SpO2 99%     Constitutional:   Body Habitus: Body mass index is 23.03 kg/m².  Cooperation: Fully cooperates with exam  Appearance: Well-groomed, well-nourished.    Eyes: No scleral icterus to suggest severe liver disease, no proptosis to suggest  severe hyperthyroidism    ENT -no obvious auditory deficits, no noticeable facial droop     Skin -no rashes or lesions noted     Respiratory-  breathing comfortably on room air, no audible wheezing    Cardiovascular-distal extremities warm and well perfused.  No lower extremity edema is noted.     Gastrointestinal - no obvious abdominal masses, non-distended    Psychiatric- alert and oriented ×3. Normal affect.     Gait stable, steady    Thoracic/Lumbar Spine/Sacral Spine/Hips   Inspection: No evidence of atrophy in bilateral lower extremities throughout     Facet loading maneuver positive on right, negative on left    Palpation:   Tenderness to palpation over the paraspinal muscles bilaterally, lumbar facets bilaterally spanning approximately L1-L5 levels, and sacroiliac joint on right.     Lumbar spine /hip provocative exam maneuvers  Straight leg raise negative bilaterally  FADIR test negative bilaterally    SI joint tests  ROSEMARY test negative bilaterally    SI joint compression positive on right, negative on left  SI joint distraction negative bilaterally  Sacral thrust test positive on right, negative on left  Thigh thrust test negative bilaterally  Gaenslens maneuver positive on right, negative on left    Key points for the international standards for neurological classification of spinal cord injury (ISNCSCI) to light touch.   Dermatome R L   L2 2 2   L3 2 2   L4 2 2   L5 2 2   S1 2 2   S2 2 2       Motor Exam Lower Extremities  ? Myotome R L   Hip flexion L2 5 5   Knee extension L3 5 5   Ankle dorsiflexion L4 5 5   Toe extension L5 5 5   Ankle plantarflexion S1 5 5         MEDICAL DECISION MAKING    Medical records review: see under HPI section.     DATA    Labs: No new labs available for review since last visit.   Lab Results   Component Value Date/Time    SODIUM 144 09/05/2024 08:40 AM    POTASSIUM 3.9 09/05/2024 08:40 AM    CHLORIDE 107 09/05/2024 08:40 AM    CO2 23 09/05/2024 08:40 AM    ANION 14.0  09/05/2024 08:40 AM    GLUCOSE 84 09/05/2024 08:40 AM    BUN 16 09/05/2024 08:40 AM    CREATININE 0.70 09/05/2024 08:40 AM    CALCIUM 9.3 09/05/2024 08:40 AM    ASTSGOT 28 09/05/2024 08:40 AM    ALTSGPT 23 09/05/2024 08:40 AM    TBILIRUBIN 0.7 09/05/2024 08:40 AM    ALBUMIN 4.4 09/05/2024 08:40 AM    TOTPROTEIN 6.7 09/05/2024 08:40 AM    GLOBULIN 2.3 09/05/2024 08:40 AM    AGRATIO 1.9 09/05/2024 08:40 AM       Lab Results   Component Value Date/Time    PROTHROMBTM 13.8 06/13/2023 01:46 PM    INR 1.07 06/13/2023 01:46 PM        Lab Results   Component Value Date/Time    WBC 4.4 (L) 09/05/2024 08:40 AM    RBC 4.29 09/05/2024 08:40 AM    HEMOGLOBIN 13.2 09/05/2024 08:40 AM    HEMATOCRIT 41.0 09/05/2024 08:40 AM    MCV 95.6 09/05/2024 08:40 AM    MCH 30.8 09/05/2024 08:40 AM    MCHC 32.2 09/05/2024 08:40 AM    MPV 10.7 09/05/2024 08:40 AM    NEUTSPOLYS 70.80 09/05/2024 08:40 AM    LYMPHOCYTES 19.90 (L) 09/05/2024 08:40 AM    MONOCYTES 7.50 09/05/2024 08:40 AM    EOSINOPHILS 1.40 09/05/2024 08:40 AM    BASOPHILS 0.20 09/05/2024 08:40 AM        Lab Results   Component Value Date/Time    HBA1C 5.5 04/19/2024 10:02 AM        Imaging:   I personally reviewed following images, these are my reads  MRI lumbar spine 2/25/2025  L2-3 there is mild canal narrowing.  At L3-4 there is mild bilateral neuroforaminal stenosis.  At L5-S1 there is moderate left neuroforaminal stenosis.  Facet arthropathy at bilateral lower lumbar levels. See formal radiology report for further details.      X-ray hip 2/7/2025  Mild OA of bilateral hips    IMAGING radiology reads. I reviewed the following radiology reads       Results for orders placed during the hospital encounter of 02/03/23    MR-BRAIN-W/O    Impression  1.  There is no acute infarct.  2.  Mild chronic microvascular ischemic disease.  3.  Mild cerebral volume loss.             Results for orders placed during the hospital encounter of 11/20/07    MR-CERVICAL  SPINE-W/O    Impression  IMPRESSION:    1. DEGENERATIVE DISC AND ARTICULAR PILLAR ARTHROPATHY, PARTICULARLY AT  C5-6 WHERE THERE IS MILD CANAL STENOSIS, MODERATE LEFT-SIDED FORAMINAL  STENOSIS, AND MILD-TO-MODERATE RIGHT-SIDED FORAMINAL STENOSIS.    2. MILD LATERAL VERTEBRAL SPURRING C3-4 WITH VERY MILD NEURAL FORAMINAL  COMPROMISE.    GEK/josh      Read By ASIA PERRIN MD on Nov 21 2007  8:27AM  : JOSH Transcription Date: Nov 21 2007 12:53PM  THIS DOCUMENT HAS BEEN ELECTRONICALLY SIGNED BY: ASIA PERRIN MD on  Nov 21 2007  4:36PM      Results for orders placed during the hospital encounter of 02/25/25    MR-LUMBAR SPINE-W/O    Impression  Moderate left foraminal narrowing at L5-S1, stable.    There is no significant spinal canal stenosis.        Results for orders placed during the hospital encounter of 02/25/25    MR-LUMBAR SPINE-W/O    Impression  Moderate left foraminal narrowing at L5-S1, stable.    There is no significant spinal canal stenosis.                                       Results for orders placed during the hospital encounter of 11/06/07    DX-CERVICAL SPINE-2 OR 3 VIEWS    Impression  IMPRESSION:    1. MODERATE DEGENERATIVE DISC DISEASE IS PRESENT AT C5-6.    2. MINIMAL SPONDYLOLISTHESIS AT C7-T1.    3. NO ACUTE FRACTURE OR DISLOCATION IS IDENTIFIED ON THREE VIEWS OF THE  CERVICAL SPINE.    TRB/wds      Read By MEGAN ROBERTSON MD on Nov 6 2007  1:33PM  : JEREMY Transcription Date: Nov 6 2007  6:44PM  THIS DOCUMENT HAS BEEN ELECTRONICALLY SIGNED BY: MEGAN ROBERTSON MD on  Nov 7 2007  8:25AM     Results for orders placed during the hospital encounter of 01/20/21    DX-CHEST-2 VIEWS    Impression  No acute cardiopulmonary abnormality.            Results for orders placed during the hospital encounter of 05/19/04    DX-HAND 3+    Impression  IMPRESSION:      1.   NO ACUTE ABNORMALITIES OF THE RIGHT HAND.              READING DR:        MEGAN ROBERTSON  MD  READING DATE:      May 19 2004  1:28PM  REPORT STATUS:     FINAL REPORT    TRANSCRIPTION CODE:         LVD  TRANSCRIPTION DATE:         May 20 2004  4:22AM    THIS DOCUMENT HAS BEEN ELECTRONICALLY SIGNED BY:  MEGAN ROBERTSON MD -  May 20 2004  8:28AM            Results for orders placed during the hospital encounter of 23    DX-LUMBAR SPINE-2 OR 3 VIEWS    Impression  Mild worsening moderate spondylosis   Results for orders placed during the hospital encounter of 10/31/23    DX-LUMBAR SPINE-4+ VIEWS    Impression  1.  Mild scoliosis.  2.  Moderate multilevel degenerative change of lumbar spine.  3.  No fracture or subluxation.  4.  No evidence of instability.                        Diagnosis  Visit Diagnoses     ICD-10-CM   1. Sacroiliac joint dysfunction of right side  M53.3   2. Lumbar spondylosis  M47.816   3. Chronic bilateral low back pain, unspecified whether sciatica present  M54.50    G89.29   4. Neuroforaminal stenosis of lumbar spine  M48.061   5. Positive depression screening  Z13.31   6. Bilateral groin pain  R10.31    R10.32   7. Strain of sacroiliac ligament, initial encounter  S33.6XXA         ASSESSMENT AND PLAN:  Haley Hawthorne (: 1955) is a female with      Haley was seen today for follow-up.    Diagnoses and all orders for this visit:    Sacroiliac joint dysfunction of right side  -     Cancel: Referral to Pain Clinic  -     Cancel: Referral to Pain Clinic  -     Referral to Pain Clinic    Lumbar spondylosis    Chronic bilateral low back pain, unspecified whether sciatica present    Neuroforaminal stenosis of lumbar spine    Positive depression screening  -     Patient has been identified as having a positive depression screening. Appropriate orders and counseling have been given.    Bilateral groin pain    Strain of sacroiliac ligament, initial encounter            PLAN  Physical Therapy: The patient has done at least 6 weeks of a provider driven physical therapy  program for this problem in the past.  Advised patient to continue exercise program as able.  Discussed referral to physical therapy which the patient declined today     Diagnostic workup: Personally reviewed at today's visit:   MRI lumbar spine 2/25/2025, X-ray hip 2/7/2025     Medications:   -Okay to continue Robaxin as per PCP  - Okay to discontinue Mobic as the patient has done due to GI upset.  She reports that she previously took ibuprofen with relief.  I reviewed her latest GFR which had significantly improved to  93 on 9/5/2024.  Discussed that I would recommend that if she decides to take ibuprofen that she take it less than daily if possible to minimize the risk  of developing  chronic kidney disease     Interventions:   -Right sacroiliac joint injection under fluoroscopic guidance.  The patient prefers to have this with sedation given her anticipation of difficulty tolerating injections without sedation. The risks, benefits, and alternatives to this procedure were discussed and the patient wishes to proceed with the procedure. Risks include but are not limited to damage to surrounding structures, infection, bleeding, worsening of pain which can be permanent, and weakness which can be permanent. Benefits include pain relief and improved function. Alternatives include not doing the procedure.    -Discussed that at this time there is not significant obvious nerve impingement on her MRI lumbar spine that would explain her pain in her thighs  -Discussed diagnostic lumbar medial branch blocks targeting Right L4-L5 and L5-S1 facet joints, defer for now in favor of right sacroiliac joint injection first    Follow-up: 3 weeks after sacroiliac joint injection    Orders Placed This Encounter    Referral to Pain Clinic    Patient has been identified as having a positive depression screening. Appropriate orders and counseling have been given.       Soraya Horne MD  Interventional Pain and Spine  Physical Medicine and  [facility]      The above note documents my personal evaluation of this patient. In addition, I have reviewed and confirmed with the patient and MA the supportive information documented in today's Patient Health Questionnaire and Office Note.     Please note that this dictation was created using voice recognition software. I have made every reasonable attempt to correct obvious errors, but I expect that there are errors of grammar and possibly content that I did not discover before finalizing the note.

## 2025-03-06 ENCOUNTER — APPOINTMENT (OUTPATIENT)
Dept: RADIOLOGY | Facility: REHABILITATION | Age: 70
End: 2025-03-06
Attending: STUDENT IN AN ORGANIZED HEALTH CARE EDUCATION/TRAINING PROGRAM
Payer: MEDICARE

## 2025-03-06 ENCOUNTER — HOSPITAL ENCOUNTER (OUTPATIENT)
Facility: REHABILITATION | Age: 70
End: 2025-03-06
Attending: STUDENT IN AN ORGANIZED HEALTH CARE EDUCATION/TRAINING PROGRAM | Admitting: STUDENT IN AN ORGANIZED HEALTH CARE EDUCATION/TRAINING PROGRAM
Payer: MEDICARE

## 2025-03-06 VITALS
BODY MASS INDEX: 22.51 KG/M2 | DIASTOLIC BLOOD PRESSURE: 57 MMHG | RESPIRATION RATE: 16 BRPM | SYSTOLIC BLOOD PRESSURE: 122 MMHG | TEMPERATURE: 97.7 F | WEIGHT: 114.64 LBS | HEIGHT: 60 IN | HEART RATE: 56 BPM | OXYGEN SATURATION: 95 %

## 2025-03-06 PROCEDURE — 99152 MOD SED SAME PHYS/QHP 5/>YRS: CPT

## 2025-03-06 PROCEDURE — 77002 NEEDLE LOCALIZATION BY XRAY: CPT

## 2025-03-06 PROCEDURE — G0260 INJ FOR SACROILIAC JT ANESTH: HCPCS

## 2025-03-06 PROCEDURE — 700117 HCHG RX CONTRAST REV CODE 255

## 2025-03-06 PROCEDURE — 700111 HCHG RX REV CODE 636 W/ 250 OVERRIDE (IP): Mod: JZ

## 2025-03-06 RX ORDER — MIDAZOLAM HYDROCHLORIDE 1 MG/ML
INJECTION INTRAMUSCULAR; INTRAVENOUS
Status: COMPLETED
Start: 2025-03-06 | End: 2025-03-06

## 2025-03-06 RX ORDER — DEXAMETHASONE SODIUM PHOSPHATE 10 MG/ML
INJECTION, SOLUTION INTRAMUSCULAR; INTRAVENOUS
Status: COMPLETED
Start: 2025-03-06 | End: 2025-03-06

## 2025-03-06 RX ORDER — LIDOCAINE HYDROCHLORIDE 10 MG/ML
INJECTION, SOLUTION EPIDURAL; INFILTRATION; INTRACAUDAL; PERINEURAL
Status: COMPLETED
Start: 2025-03-06 | End: 2025-03-06

## 2025-03-06 RX ADMIN — IOHEXOL 10 ML: 240 INJECTION, SOLUTION INTRATHECAL; INTRAVASCULAR; INTRAVENOUS; ORAL at 10:35

## 2025-03-06 RX ADMIN — FENTANYL CITRATE 50 MCG: 50 INJECTION, SOLUTION INTRAMUSCULAR; INTRAVENOUS at 10:32

## 2025-03-06 RX ADMIN — LIDOCAINE HYDROCHLORIDE 10 ML: 10 INJECTION, SOLUTION EPIDURAL; INFILTRATION; INTRACAUDAL; PERINEURAL at 10:36

## 2025-03-06 RX ADMIN — MIDAZOLAM HYDROCHLORIDE 1 MG: 1 INJECTION, SOLUTION INTRAMUSCULAR; INTRAVENOUS at 10:32

## 2025-03-06 RX ADMIN — DEXAMETHASONE SODIUM PHOSPHATE 10 MG: 10 INJECTION, SOLUTION INTRAMUSCULAR; INTRAVENOUS at 10:35

## 2025-03-06 ASSESSMENT — PAIN DESCRIPTION - PAIN TYPE
TYPE: CHRONIC PAIN

## 2025-03-06 ASSESSMENT — FIBROSIS 4 INDEX: FIB4 SCORE: 2.25

## 2025-03-06 NOTE — OP REPORT
Date of Service: 3/6/2025    Patient: Haley Hawthorne 70 y.o. female     MRN: [id number]     Physician/s: Soraya Horne MD    Pre-operative Diagnosis: right Sacroiliac dysfunction    Post-operative Diagnosis: right Sacroiliac dysfunction    Procedure: right Sacroiliac joint injection    Description of procedure:    The risks, benefits, and alternatives of the procedure were reviewed and discussed with the patient.  Written informed consent was freely obtained. A pre-procedural time-out was conducted by the physician verifying patient’s identity, procedure to be performed, procedure site and side, and allergy verification. Appropriate equipment was determined to be in place for the procedure.     Moderation sedation was achieved with Versed (1mg) and Fentanyl (50mcg). Monitoring of the patients vital signs and respiratory status was provided by trained independent registered nurse during the entire course of the procedures and under my supervision and recoded in the patient’s medical record. The duration of sedation was over 10 minutes.       In the fluoroscopy suite the patient was placed in a prone position and the skin was prepped and draped in the usual sterile fashion. The fluoroscope was placed over the right sacroiliac joint at the appropriate angle for joint entrance, and the target for injection was marked. A 27g needle was placed into each of the marked level(s), and approx 2cc of 1% Lidocaine was injected subcutaneously into the epidermal and dermal layers. The needle was removed. A 25g 3.5 inch needle was then placed down to the level of bone at the inferior/distal aspect of the joint. The needle was then retracted and carefully advanced into the joint capsule. The needle tip was then verified by a lateral view. In the AP view, contrast dye was used to highlight the joint line while the fluoroscope was running live. Following negative aspiration, approx 1mL of 1% lidocaine with 1mL of 10 mg/mL  dexamethasone was then injected. The needle was removed intact after being restyleted. The patient's low back was covered with a 4x4 gauze, the area was cleansed with sterile normal saline, and a dressing was applied. There were no complications noted.     Follow-up as scheduled    Pain score prior to injection: 10/10 on NRS  Pain score immediately after injection: 0/10 on NRS  Greater than 50% pain relief was achieved immediately after the injection.    Soraya Horne MD  Interventional Pain Management  Physical Medicine and Rehabilitation  [facility]  3/6/2025        CPT  sacroiliac joint with fluoroscopy 78631    Interlaminar epidural injection - lumbar or sacral (caudal): 74963

## 2025-03-06 NOTE — INTERVAL H&P NOTE
Consented Procedure: RIGHT sacroiliac joint injection with fluoroscopic guidance with sedation  I have examined the patient, provided the risks, benefits, and alternatives to the procedure(s) indicated on the signed consent form, and the patient wishes to proceed.    H&P reviewed. The patient was examined and there are no changes to the H&P      Soraya Horne M.D.  03/06/25 10:08 AM

## 2025-03-10 ENCOUNTER — PATIENT MESSAGE (OUTPATIENT)
Dept: MEDICAL GROUP | Facility: PHYSICIAN GROUP | Age: 70
End: 2025-03-10
Payer: MEDICARE

## 2025-03-12 ENCOUNTER — TELEPHONE (OUTPATIENT)
Dept: PHYSICAL MEDICINE AND REHAB | Facility: MEDICAL CENTER | Age: 70
End: 2025-03-12

## 2025-03-12 ENCOUNTER — OFFICE VISIT (OUTPATIENT)
Dept: MEDICAL GROUP | Facility: PHYSICIAN GROUP | Age: 70
End: 2025-03-12
Payer: MEDICARE

## 2025-03-12 ENCOUNTER — HOSPITAL ENCOUNTER (OUTPATIENT)
Dept: RADIOLOGY | Facility: MEDICAL CENTER | Age: 70
End: 2025-03-12
Attending: STUDENT IN AN ORGANIZED HEALTH CARE EDUCATION/TRAINING PROGRAM
Payer: MEDICARE

## 2025-03-12 VITALS
OXYGEN SATURATION: 95 % | SYSTOLIC BLOOD PRESSURE: 100 MMHG | HEIGHT: 60 IN | BODY MASS INDEX: 21.99 KG/M2 | TEMPERATURE: 98 F | WEIGHT: 112 LBS | DIASTOLIC BLOOD PRESSURE: 68 MMHG | HEART RATE: 62 BPM

## 2025-03-12 DIAGNOSIS — K59.00 CONSTIPATION, UNSPECIFIED CONSTIPATION TYPE: ICD-10-CM

## 2025-03-12 DIAGNOSIS — K64.9 HEMORRHOIDS, UNSPECIFIED HEMORRHOID TYPE: ICD-10-CM

## 2025-03-12 DIAGNOSIS — I10 ESSENTIAL HYPERTENSION, BENIGN: Chronic | ICD-10-CM

## 2025-03-12 DIAGNOSIS — Z12.11 COLON CANCER SCREENING: ICD-10-CM

## 2025-03-12 DIAGNOSIS — R19.4 CHANGE IN BOWEL HABITS: ICD-10-CM

## 2025-03-12 PROCEDURE — 74018 RADEX ABDOMEN 1 VIEW: CPT

## 2025-03-12 PROCEDURE — 3078F DIAST BP <80 MM HG: CPT | Performed by: STUDENT IN AN ORGANIZED HEALTH CARE EDUCATION/TRAINING PROGRAM

## 2025-03-12 PROCEDURE — 3074F SYST BP LT 130 MM HG: CPT | Performed by: STUDENT IN AN ORGANIZED HEALTH CARE EDUCATION/TRAINING PROGRAM

## 2025-03-12 PROCEDURE — 99214 OFFICE O/P EST MOD 30 MIN: CPT | Performed by: STUDENT IN AN ORGANIZED HEALTH CARE EDUCATION/TRAINING PROGRAM

## 2025-03-12 ASSESSMENT — FIBROSIS 4 INDEX: FIB4 SCORE: 2.25

## 2025-03-12 NOTE — PROGRESS NOTES
Called pt in regard to paper work that havent been picked up. Pt stated that he will pick it up this Saturday.     Subjective:     Chief Complaint   Patient presents with    Constipation     Pt states having three hemorrhoids, pt states having diarrhea for a week  Hot flashes, lots of gases, fatigue, nausea, upset stomach         HPI:   Haley presents today with 1 week of constipation with small, hard stools.  Her  gave her Miralax, which subsequently resulted in diarrhea for 2 days.  She also reports hemorrhoidal pain worsened with the constipation.  Last colonoscopy was Nov 2022 and repeat in 5 years was recommended.      Health Maintenance: Completed    ROS:  Negative except as stated above.      Objective:     Exam:  /68 (BP Location: Left arm, Patient Position: Sitting, BP Cuff Size: Adult)   Pulse 62   Temp 36.7 °C (98 °F) (Temporal)   Ht 1.524 m (5')   Wt 50.8 kg (112 lb)   SpO2 95%   BMI 21.87 kg/m²  Body mass index is 21.87 kg/m².    Physical Exam    Gen: Alert and oriented, no acute distress..  Lungs: Normal effort, CTAB, no wheezing / rhonchi / rales.  CV: RRR, normal S1 and S2, no murmurs.  GI:  Abdomen soft, non-tender, non-distended with hyperactive bowel sounds and no palpable masses.      Assessment & Plan:     70 y.o. female with the following -     1. Constipation, unspecified constipation type  2. Change in bowel habits  3. Colon cancer screening  This is a new problem.  She reports 1 week of constipation that resolved with Miralax but resulted in diarrhea.  X-ray ordered to ensure resolution and rule out mass.  She had also reported 5 pound unintentional weight loss at her last visit.  Colonoscopy was last done November 2022 and repeat in 5 years recommended.  Due to change in bowel habits she was referred back to GI to discuss repeat colonoscopy.  Advised stool softener twice daily and Miralax as needed to prevent constipation.  - [id number] VIEW; Future  - Referral to Gastroenterology    4. Hemorrhoids, unspecified hemorrhoid type  Chronic with acute exacerbation.  Advised stool  softener twice daily and Miralax as needed to prevent constipation.  Given referral to GI.    5. Essential hypertension, benign  Chronic, controlled.  /68 today.  Continue losartan 25 mg daily and verapamil ER to 40 mg daily.      Return in about 7 weeks (around 4/30/2025).      Please note that this dictation was created using voice recognition software. I have made every reasonable attempt to correct obvious errors, but I expect that there are errors of grammar and possibly content that I did not discover before finalizing the note.

## 2025-03-12 NOTE — TELEPHONE ENCOUNTER
Called for post-sp check-up. Pt reported the following regarding the procedure site:RIGHT sacroiliac joint injection with fluoroscopic guidance     Change in pain?: improved     Concerns?: none     Confirmed [facility] appt?: yes

## 2025-03-13 ENCOUNTER — RESULTS FOLLOW-UP (OUTPATIENT)
Dept: MEDICAL GROUP | Facility: PHYSICIAN GROUP | Age: 70
End: 2025-03-13
Payer: MEDICARE

## 2025-03-27 ENCOUNTER — APPOINTMENT (OUTPATIENT)
Dept: PHYSICAL MEDICINE AND REHAB | Facility: MEDICAL CENTER | Age: 70
End: 2025-03-27
Payer: MEDICARE

## 2025-03-27 RX ORDER — OMEPRAZOLE 20 MG/1
20 CAPSULE, DELAYED RELEASE ORAL EVERY MORNING
Qty: 100 CAPSULE | Refills: 0 | Status: SHIPPED | OUTPATIENT
Start: 2025-03-27

## 2025-03-27 NOTE — TELEPHONE ENCOUNTER
Received request via: Patient    Was the patient seen in the last year in this department? Yes    Does the patient have an active prescription (recently filled or refills available) for medication(s) requested? No    [facility]    Does the patient have care home Plus and need 100-day supply? (This applies to ALL medications) Yes, quantity updated to 100 days

## 2025-03-28 RX ORDER — PAROXETINE 20 MG/1
20 TABLET, FILM COATED ORAL DAILY
COMMUNITY
End: 2025-03-28

## 2025-03-28 RX ORDER — PAROXETINE 40 MG/1
40 TABLET, FILM COATED ORAL EVERY MORNING
Qty: 30 TABLET | Refills: 0 | Status: SHIPPED | OUTPATIENT
Start: 2025-03-28

## 2025-03-28 NOTE — TELEPHONE ENCOUNTER
Patient is requesting a refill of medication, patient has provided does and frequency of use as this was not in her list of active medications. I let patient know that Dr Carrillo is out and would seek assistance from a covering provider as it is not in her active med list.    Thankyou.    Received request via: Patient    Was the patient seen in the last year in this department? Yes    Does the patient have an active prescription (recently filled or refills available) for medication(s) requested? No    Pharmacy Name: [facility]    Does the patient have alf Plus and need 100-day supply? (This applies to ALL medications) Patient does not have SCP

## 2025-04-01 ENCOUNTER — APPOINTMENT (OUTPATIENT)
Dept: MEDICAL GROUP | Facility: PHYSICIAN GROUP | Age: 70
End: 2025-04-01
Payer: MEDICARE

## 2025-04-02 RX ORDER — PAROXETINE 40 MG/1
40 TABLET, FILM COATED ORAL EVERY MORNING
Qty: 100 TABLET | Refills: 0 | Status: SHIPPED | OUTPATIENT
Start: 2025-04-02

## 2025-04-03 ENCOUNTER — PATIENT MESSAGE (OUTPATIENT)
Dept: PHYSICAL MEDICINE AND REHAB | Facility: MEDICAL CENTER | Age: 70
End: 2025-04-03
Payer: MEDICARE

## 2025-04-03 NOTE — TELEPHONE ENCOUNTER
Received request via: Patient    Was the patient seen in the last year in this department? Yes    Does the patient have an active prescription (recently filled or refills available) for medication(s) requested? No    [facility]    Does the patient have senior living Plus and need 100-day supply? (This applies to ALL medications) Patient does not have SCP

## 2025-04-03 NOTE — TELEPHONE ENCOUNTER
Requested Prescriptions     Pending Prescriptions Disp Refills    paroxetine (PAXIL) 40 MG tablet 100 Tablet 0     Sig: Take 1 Tablet by mouth every morning.       CHARISSA Khalil.

## 2025-04-04 NOTE — PATIENT COMMUNICATION
Phone Number Called: [phone]      Call outcome: Did not leave a detailed message. Requested patient to call back.    Message: attempted to reach patient to reschedule appt.

## 2025-04-08 ENCOUNTER — OFFICE VISIT (OUTPATIENT)
Dept: MEDICAL GROUP | Facility: PHYSICIAN GROUP | Age: 70
End: 2025-04-08
Payer: MEDICARE

## 2025-04-08 VITALS
HEART RATE: 82 BPM | TEMPERATURE: 98.5 F | BODY MASS INDEX: 21.99 KG/M2 | DIASTOLIC BLOOD PRESSURE: 62 MMHG | WEIGHT: 112 LBS | SYSTOLIC BLOOD PRESSURE: 98 MMHG | HEIGHT: 60 IN

## 2025-04-08 DIAGNOSIS — M53.3 SACROILIAC JOINT DYSFUNCTION OF RIGHT SIDE: ICD-10-CM

## 2025-04-08 DIAGNOSIS — K22.70 BARRETT'S ESOPHAGUS WITHOUT DYSPLASIA: Chronic | ICD-10-CM

## 2025-04-08 DIAGNOSIS — I10 ESSENTIAL HYPERTENSION, BENIGN: Chronic | ICD-10-CM

## 2025-04-08 DIAGNOSIS — R07.9 CHEST PAIN, UNSPECIFIED TYPE: ICD-10-CM

## 2025-04-08 PROCEDURE — 99214 OFFICE O/P EST MOD 30 MIN: CPT | Performed by: STUDENT IN AN ORGANIZED HEALTH CARE EDUCATION/TRAINING PROGRAM

## 2025-04-08 PROCEDURE — 3074F SYST BP LT 130 MM HG: CPT | Performed by: STUDENT IN AN ORGANIZED HEALTH CARE EDUCATION/TRAINING PROGRAM

## 2025-04-08 PROCEDURE — 3078F DIAST BP <80 MM HG: CPT | Performed by: STUDENT IN AN ORGANIZED HEALTH CARE EDUCATION/TRAINING PROGRAM

## 2025-04-08 ASSESSMENT — FIBROSIS 4 INDEX: FIB4 SCORE: 2.25

## 2025-04-08 NOTE — PROGRESS NOTES
Subjective:     Chief Complaint   Patient presents with    Heart Problem     Pt states possibly having heart attack, pain around chest, pt states blood pressure having 91/56    Requesting Labs       History of Present Illness  The patient presents for evaluation of chest tightness, low blood pressure, hemorrhoids, and back pain.    She experienced an episode of severe chest tightness approximately 1 week ago while walking her dog, which lasted slightly over a minute. Upon returning home, she measured her blood pressure, which was 84/56, and after a 30-minute interval, it increased to 91/62. She has had similar episodes in the past, but they were less intense and shorter in duration. These episodes have always occurred during physical activity, never at rest. She is under the care of a cardiologist, Dr. Jovon Ennis, whom she consults once or twice annually, with an upcoming appointment scheduled for 2025. Her last cardiac catheterization and stress test were conducted in . She reports a family history of heart disease, with her mother and two brothers having cardiac issues, one of whom required a pacemaker and the other succumbed to a heart attack.    She has an upcoming appointment with GI to discuss EGD and colonoscopy for follow-up of Rosado's esophagus and change in bowel habits. Her weight has remained stable at 112 pounds.    She has been seeing Dr. Horne for her back pain. She received an injection in her right SI joint on 2025, which has provided relief.    FAMILY HISTORY  Her mother has a history of heart disease. One brother  of a heart attack, and another brother has heart issues and requires a pacemaker.    MEDICATIONS  Current: verapamil, losartan, atorvastatin, baby aspirin    IMMUNIZATIONS  She is up to date on her vaccines.      Health Maintenance: Completed    ROS:  Negative except as stated above.      Objective:     Exam:  BP 98/62 (BP Location: Right arm, Patient Position:  Sitting, BP Cuff Size: Adult)   Pulse 82   Temp 36.9 °C (98.5 °F) (Temporal)   Ht 1.524 m (5')   Wt 50.8 kg (112 lb)   PF 97 L/min   BMI 21.87 kg/m²  Body mass index is 21.87 kg/m².    Physical Exam    Gen: Alert and oriented, no acute distress.  Lungs: Normal effort, CTAB, no wheezing / rhonchi / rales.  CV: RRR, normal S1 and S2, no murmurs.      Assessment & Plan:     70 y.o. female with the following -     1. Essential hypertension, benign  Chronic, controlled.  BP 98/62 and during recent episode of chest pain BP was 84/56 then 91/56 30 minutes later.  BP medication dose may need to be adjusted and she has an upcoming appointment with cardiology on 4/28.  Continue losartan 25 mg daily and verapamil 240 mg daily.    2. Chest pain, unspecified type  This is a new problem.  She reports similar episodes in the past but recent episode while she was walking her dog lasted 1 minute and was the most severe.  She follows up with cardiology (records requested from Dr. Ennis) and has an appointment on 4/28.  Stress test was abnormal February 2023 and cardiac cath was done June 2023 but no report available.  Discussed with the patient she may need another stress test and to follow-up with cardiology.  Discussed red flags today that would warrant ER evaluation.    3. Sacroiliac joint dysfunction of right side  Chronic, stable.  She follows up with pain management and reports improvement in symptoms after right SI joint injection 1 month ago.    4. Rosado's esophagus without dysplasia  Chronic.  Found on EGD in Nov 2022 and repeat in 3 years was recommended.  She has appointment with GI on 5/22.        Return in about 6 months (around 10/8/2025) for Follow-up of chronic conditions.    Verbal consent was acquired by the patient to use [employer] ambient listening note generation during this visit: Yes.    Please note that this dictation was created using voice recognition software. I have made every reasonable attempt  to correct obvious errors, but I expect that there are errors of grammar and possibly content that I did not discover before finalizing the note.

## 2025-04-08 NOTE — LETTER
[facility]  Niru Carrillo M.D.  [address]  [place] [address]  Fax: [phone]   Authorization for Release/Disclosure of   Protected Health Information   Name: [place] FOFANA LESLIEGIANA HAWTHORNE : 1955 SSN: [id number]   Address: [facility] [address]  [place] [address] Phone:    There are no phone numbers on file.   I authorize the entity listed below to release/disclose the PHI below to:   [facility]/Niru Carrillo M.D. and Niru Carrillo M.D.   Provider or Entity Name:  Dr. Jovon Ennis   Address   City, [facility], [facility]   Phone:      Fax:     Reason for request: continuity of care   Information to be released:    [  ] LAST COLONOSCOPY,  including any PATH REPORT and follow-up  [  ] LAST FIT/COLOGUARD RESULT [  ] LAST DEXA  [  ] LAST MAMMOGRAM  [  ] LAST PAP  [  ] LAST LABS [  ] RETINA EXAM REPORT  [  ] IMMUNIZATION RECORDS  [ X ] Release all info      [  ] Check here and initial the line next to each item to release ALL health information INCLUDING  _____ Care and treatment for drug and / or alcohol abuse  _____ HIV testing, infection status, or AIDS  _____ Genetic Testing    DATES OF SERVICE OR TIME PERIOD TO BE DISCLOSED: _____________  I understand and acknowledge that:  * This Authorization may be revoked at any time by you in writing, except if your health information has already been used or disclosed.  * Your health information that will be used or disclosed as a result of you signing this authorization could be re-disclosed by the recipient. If this occurs, your re-disclosed health information may no longer be protected by State or Federal laws.  * You may refuse to sign this Authorization. Your refusal will not affect your ability to obtain treatment.  * This Authorization becomes effective upon signing and will  on (date) __________.      If no date is indicated, this Authorization will  one (1) year from the signature date.    Name: [place] FOFANA Lesliegiana Hawthorne  Signature:  Date:   4/8/2025     PLEASE FAX REQUESTED RECORDS BACK TO: [phone]

## 2025-04-25 ENCOUNTER — HOSPITAL ENCOUNTER (OUTPATIENT)
Dept: LAB | Facility: MEDICAL CENTER | Age: 70
End: 2025-04-25
Payer: MEDICARE

## 2025-04-25 LAB
BASOPHILS # BLD AUTO: 0.7 % (ref 0–1.8)
BASOPHILS # BLD: 0.02 K/UL (ref 0–0.12)
EOSINOPHIL # BLD AUTO: 0.08 K/UL (ref 0–0.51)
EOSINOPHIL NFR BLD: 2.8 % (ref 0–6.9)
ERYTHROCYTE [DISTWIDTH] IN BLOOD BY AUTOMATED COUNT: 41.8 FL (ref 35.9–50)
HCT VFR BLD AUTO: 39.7 % (ref 37–47)
HGB BLD-MCNC: 12.8 G/DL (ref 12–16)
IMM GRANULOCYTES # BLD AUTO: 0 K/UL (ref 0–0.11)
IMM GRANULOCYTES NFR BLD AUTO: 0 % (ref 0–0.9)
LYMPHOCYTES # BLD AUTO: 0.83 K/UL (ref 1–4.8)
LYMPHOCYTES NFR BLD: 29.5 % (ref 22–41)
MCH RBC QN AUTO: 30 PG (ref 27–33)
MCHC RBC AUTO-ENTMCNC: 32.2 G/DL (ref 32.2–35.5)
MCV RBC AUTO: 93 FL (ref 81.4–97.8)
MONOCYTES # BLD AUTO: 0.29 K/UL (ref 0–0.85)
MONOCYTES NFR BLD AUTO: 10.3 % (ref 0–13.4)
NEUTROPHILS # BLD AUTO: 1.59 K/UL (ref 1.82–7.42)
NEUTROPHILS NFR BLD: 56.7 % (ref 44–72)
NRBC # BLD AUTO: 0 K/UL
NRBC BLD-RTO: 0 /100 WBC (ref 0–0.2)
PLATELET # BLD AUTO: 161 K/UL (ref 164–446)
PMV BLD AUTO: 10.8 FL (ref 9–12.9)
RBC # BLD AUTO: 4.27 M/UL (ref 4.2–5.4)
WBC # BLD AUTO: 2.8 K/UL (ref 4.8–10.8)

## 2025-04-25 PROCEDURE — 36415 COLL VENOUS BLD VENIPUNCTURE: CPT

## 2025-04-25 PROCEDURE — 84439 ASSAY OF FREE THYROXINE: CPT

## 2025-04-25 PROCEDURE — 80053 COMPREHEN METABOLIC PANEL: CPT

## 2025-04-25 PROCEDURE — 85025 COMPLETE CBC W/AUTO DIFF WBC: CPT

## 2025-04-25 PROCEDURE — 84481 FREE ASSAY (FT-3): CPT

## 2025-04-25 PROCEDURE — 84443 ASSAY THYROID STIM HORMONE: CPT

## 2025-04-26 LAB
ALBUMIN SERPL BCP-MCNC: 4.3 G/DL (ref 3.2–4.9)
ALBUMIN/GLOB SERPL: 1.8 G/DL
ALP SERPL-CCNC: 81 U/L (ref 30–99)
ALT SERPL-CCNC: 21 U/L (ref 2–50)
ANION GAP SERPL CALC-SCNC: 9 MMOL/L (ref 7–16)
AST SERPL-CCNC: 29 U/L (ref 12–45)
BILIRUB SERPL-MCNC: 0.5 MG/DL (ref 0.1–1.5)
BUN SERPL-MCNC: 18 MG/DL (ref 8–22)
CALCIUM ALBUM COR SERPL-MCNC: 9.7 MG/DL (ref 8.5–10.5)
CALCIUM SERPL-MCNC: 9.9 MG/DL (ref 8.5–10.5)
CHLORIDE SERPL-SCNC: 107 MMOL/L (ref 96–112)
CO2 SERPL-SCNC: 25 MMOL/L (ref 20–33)
CREAT SERPL-MCNC: 0.88 MG/DL (ref 0.5–1.4)
GFR SERPLBLD CREATININE-BSD FMLA CKD-EPI: 70 ML/MIN/1.73 M 2
GLOBULIN SER CALC-MCNC: 2.4 G/DL (ref 1.9–3.5)
GLUCOSE SERPL-MCNC: 93 MG/DL (ref 65–99)
POTASSIUM SERPL-SCNC: 4.4 MMOL/L (ref 3.6–5.5)
PROT SERPL-MCNC: 6.7 G/DL (ref 6–8.2)
SODIUM SERPL-SCNC: 141 MMOL/L (ref 135–145)
T3FREE SERPL-MCNC: 2.49 PG/ML (ref 2–4.4)
T4 FREE SERPL-MCNC: 1.1 NG/DL (ref 0.93–1.7)
T4 SERPL-MCNC: 6.2 UG/DL (ref 4–12)
TSH SERPL-ACNC: 1.84 UIU/ML (ref 0.38–5.33)

## 2025-04-30 ENCOUNTER — APPOINTMENT (OUTPATIENT)
Dept: MEDICAL GROUP | Facility: PHYSICIAN GROUP | Age: 70
End: 2025-04-30
Payer: MEDICARE

## 2025-05-06 ENCOUNTER — APPOINTMENT (OUTPATIENT)
Dept: MEDICAL GROUP | Facility: PHYSICIAN GROUP | Age: 70
End: 2025-05-06
Payer: MEDICARE

## 2025-05-06 ENCOUNTER — OFFICE VISIT (OUTPATIENT)
Dept: MEDICAL GROUP | Facility: PHYSICIAN GROUP | Age: 70
End: 2025-05-06
Payer: MEDICARE

## 2025-05-06 VITALS
OXYGEN SATURATION: 99 % | HEIGHT: 62 IN | HEART RATE: 68 BPM | DIASTOLIC BLOOD PRESSURE: 52 MMHG | BODY MASS INDEX: 21.36 KG/M2 | TEMPERATURE: 98.8 F | WEIGHT: 116.1 LBS | SYSTOLIC BLOOD PRESSURE: 122 MMHG

## 2025-05-06 DIAGNOSIS — M85.89 OSTEOPENIA OF MULTIPLE SITES: Chronic | ICD-10-CM

## 2025-05-06 DIAGNOSIS — J32.9 CHRONIC SINUSITIS, UNSPECIFIED LOCATION: ICD-10-CM

## 2025-05-06 DIAGNOSIS — R07.89 CHEST TIGHTNESS: ICD-10-CM

## 2025-05-06 PROCEDURE — 3078F DIAST BP <80 MM HG: CPT | Performed by: NURSE PRACTITIONER

## 2025-05-06 PROCEDURE — 3074F SYST BP LT 130 MM HG: CPT | Performed by: NURSE PRACTITIONER

## 2025-05-06 PROCEDURE — 99213 OFFICE O/P EST LOW 20 MIN: CPT | Performed by: NURSE PRACTITIONER

## 2025-05-06 RX ORDER — IPRATROPIUM BROMIDE 42 UG/1
2 SPRAY, METERED NASAL 4 TIMES DAILY
Qty: 15 ML | Refills: 0 | Status: SHIPPED | OUTPATIENT
Start: 2025-05-06

## 2025-05-06 RX ORDER — FLUTICASONE PROPIONATE 44 UG/1
2 AEROSOL, METERED RESPIRATORY (INHALATION) 2 TIMES DAILY PRN
Qty: 10.6 G | Refills: 0 | Status: SHIPPED | OUTPATIENT
Start: 2025-05-06

## 2025-05-06 ASSESSMENT — ENCOUNTER SYMPTOMS
CHILLS: 1
FATIGUE: 1
CHEST TIGHTNESS: 1
FEVER: 0
SHORTNESS OF BREATH: 1

## 2025-05-06 ASSESSMENT — FIBROSIS 4 INDEX: FIB4 SCORE: 2.79

## 2025-05-06 NOTE — PROGRESS NOTES
"Verbal consent was acquired by the patient to use [employer] ambient listening note generation during this visit Yes      Subjective   Haley Hawthorne is a 70 y.o. female who presents for:  History of Present Illness  The patient presents for evaluation of nasal congestion and sinus pressure.    She has been experiencing nasal congestion for approximately 3 weeks, which she attributes to allergies. This morning, she experienced significant internal nasal swelling, leading to difficulty in breathing. She also reports sinus pressure in the nasal area. She is experiencing fatigue and chills. She does not report any fevers, ear pain, or sore throat. She has a mild cough and is experiencing chest tightness and shortness of breath. She has been using oxymetazoline nasal spray for the past 1.5 weeks, which provides some relief but does not alleviate the swelling. She is not currently using ipratropium nasal spray, which was previously prescribed for her sinuses.    She is not currently taking alendronate for osteoporosis.    Review of Systems   Constitutional:  Positive for chills and fatigue. Negative for fever.   HENT:  Positive for congestion.    Respiratory:  Positive for chest tightness and shortness of breath.    Allergic/Immunologic: Positive for environmental allergies.   All other systems reviewed and are negative.    Objective   /52 (BP Location: Left arm, Patient Position: Sitting, BP Cuff Size: Adult)   Pulse 68   Temp 37.1 °C (98.8 °F) (Temporal)   Ht 1.575 m (5' 2\")   Wt 52.7 kg (116 lb 1.6 oz)   SpO2 99%   Physical Exam  General: Well nourished, well developed female in NAD, awake and conversant.  Eyes: Normal conjunctiva, anicteric.  Round symmetrical pupils.  ENT: Hearing grossly intact.  No nasal discharge. Septum midline, nares patent, mucosa normal.  Neck: Neck is supple.  No masses or thyromegaly.  CV: No lower extremity edema.  Respiratory: Respirations are nonlabored.  No " wheezing.  Abdomen: Non-Distended.  Skin: Warm.  No rashes or ulcers.  MSK: Normal ambulation.  No clubbing or cyanosis.  Neuro: Sensation and CN II-XII grossly normal.  Psych: Alert and oriented.  Cooperative, appropriate mood and affect, normal judgment.      Assessment & Plan  1. Chest tightness  New to examiner, ongoing for the patient for about 3 weeks.  - Reports chest tightness and shortness of breath, potentially related to nasal congestion and sinus pressure.  - Currently using fluticasone inhaler (Flovent) with noted effectiveness.  - Discussed the need for a refill of the fluticasone inhaler.  - Prescription for the fluticasone inhaler will be sent to the pharmacy.  - fluticasone (FLOVENT HFA) 44 MCG/ACT Aerosol; Inhale 2 Puffs 2 times a day as needed (Shortness of breath).  Dispense: 10.6 g; Refill: 0    2. Chronic sinusitis, unspecified location  New to examiner, ongoing for the patient for about 3 weeks.  - Experiencing nasal congestion and sinus pressure for approximately 3 weeks, likely due to allergies.  - Physical exam findings include swollen nasal passages.  - Discussed the use of a Neti pot with hypertonic saline solution to reduce swelling and clear the sinuses.  - Prescription for ipratropium nasal spray (Atrovent) will be sent to the pharmacy to help decrease swelling and irritation from allergies.  - ipratropium (ATROVENT) 0.06 % Solution; Administer 2 Sprays into affected nostril(S) 4 times a day.  Dispense: 15 mL; Refill: 0    3. Osteopenia of multiple sites  New to examiner, chronic for patient.    - Not currently taking alendronate (Fosamax) 70 mg weekly as prescribed by Dr. Carrillo.  - Advised to check if the medication is available at home and to start taking it once a week as prescribed.  - Reviewed the importance of medication adherence for osteoporosis management.  - Due for DEXA in September 2025.    Return if symptoms worsen or fail to improve.     Please note that this dictation  was created using voice recognition software. I have made every reasonable attempt to correct obvious errors, but I expect that there are errors of grammar and possibly content that I did not discover before finalizing the note.

## 2025-05-12 ENCOUNTER — APPOINTMENT (OUTPATIENT)
Dept: MEDICAL GROUP | Facility: PHYSICIAN GROUP | Age: 70
End: 2025-05-12
Payer: MEDICARE

## 2025-06-03 ENCOUNTER — OFFICE VISIT (OUTPATIENT)
Dept: MEDICAL GROUP | Facility: PHYSICIAN GROUP | Age: 70
End: 2025-06-03
Payer: MEDICARE

## 2025-06-03 VITALS
SYSTOLIC BLOOD PRESSURE: 102 MMHG | DIASTOLIC BLOOD PRESSURE: 50 MMHG | BODY MASS INDEX: 19.81 KG/M2 | HEIGHT: 64 IN | RESPIRATION RATE: 18 BRPM | WEIGHT: 116 LBS | TEMPERATURE: 98.6 F | OXYGEN SATURATION: 97 % | HEART RATE: 68 BPM

## 2025-06-03 DIAGNOSIS — I10 ESSENTIAL HYPERTENSION, BENIGN: Primary | Chronic | ICD-10-CM

## 2025-06-03 DIAGNOSIS — K21.9 GASTROESOPHAGEAL REFLUX DISEASE, UNSPECIFIED WHETHER ESOPHAGITIS PRESENT: Chronic | ICD-10-CM

## 2025-06-03 DIAGNOSIS — M85.89 OSTEOPENIA OF MULTIPLE SITES: ICD-10-CM

## 2025-06-03 DIAGNOSIS — K22.70 BARRETT'S ESOPHAGUS WITHOUT DYSPLASIA: Chronic | ICD-10-CM

## 2025-06-03 DIAGNOSIS — N89.8 VAGINAL DRYNESS: ICD-10-CM

## 2025-06-03 PROCEDURE — 3078F DIAST BP <80 MM HG: CPT | Performed by: STUDENT IN AN ORGANIZED HEALTH CARE EDUCATION/TRAINING PROGRAM

## 2025-06-03 PROCEDURE — 99214 OFFICE O/P EST MOD 30 MIN: CPT | Performed by: STUDENT IN AN ORGANIZED HEALTH CARE EDUCATION/TRAINING PROGRAM

## 2025-06-03 PROCEDURE — 3074F SYST BP LT 130 MM HG: CPT | Performed by: STUDENT IN AN ORGANIZED HEALTH CARE EDUCATION/TRAINING PROGRAM

## 2025-06-03 RX ORDER — ALENDRONATE SODIUM 70 MG/1
70 TABLET ORAL
Qty: 12 TABLET | Refills: 3 | Status: SHIPPED | OUTPATIENT
Start: 2025-06-03

## 2025-06-03 RX ORDER — ESTRADIOL 0.1 MG/G
1 CREAM VAGINAL DAILY
Qty: 42.5 G | Refills: 1 | Status: SHIPPED | OUTPATIENT
Start: 2025-06-03

## 2025-06-03 ASSESSMENT — FIBROSIS 4 INDEX: FIB4 SCORE: 2.79

## 2025-06-03 NOTE — PROGRESS NOTES
"Subjective:     Chief Complaint   Patient presents with    Vaginal Pain     Seems to be during intercourse, and maybe dry         History of Present Illness  The patient presents for evaluation of vaginal dryness, hypertension, and osteopenia.    She reports experiencing severe vaginal dryness during sexual intercourse, describing the sensation as feeling like a knife. She has not attempted to alleviate the symptoms with any lubricants. She underwent a total hysterectomy in the past and has been post-menopausal for a significant period.    She has discontinued her losartan medication due to its unnecessary use. She consumed half a tablet this morning, marking her first instance of doing so. She has been advised to continue this regimen until the medication is depleted. Additionally, she has ceased alcohol consumption, which she believes is contributing to her improved health. Her previous alcohol intake was 2 to 3 glasses of wine nightly. /50 today. She also takes verapamil.    She has an endoscopy scheduled with GI in 07/2025. She is still on omeprazole 20 mg.    She was prescribed Fosamax at the end of 12/2024 for osteopenia but never started the medication.    PAST SURGICAL HISTORY:  - Total hysterectomy    SOCIAL HISTORY  She quit drinking altogether, previously consuming 2 to 3 glasses of wine every night.      Health Maintenance: Completed    ROS:  Negative except as stated above.      Objective:     Exam:  /50 (BP Location: Right arm, Patient Position: Sitting, BP Cuff Size: Adult)   Pulse 68   Temp 37 °C (98.6 °F) (Temporal)   Resp 18   Ht 1.62 m (5' 3.78\")   Wt 52.6 kg (116 lb)   SpO2 97%   BMI 20.05 kg/m²  Body mass index is 20.05 kg/m².    Physical Exam    Gen: Alert and oriented, no acute distress.  Lungs: Normal effort, CTAB, no wheezing / rhonchi / rales.  CV: RRR, normal S1 and S2, no murmurs.      Assessment & Plan:     70 y.o. female with the following -     1. Vaginal " dryness  This is a new problem.  She complains of dyspareunia.  We will try vaginal estrogen.  Side effects of medication were discussed.  She was also advised to try lubricant for sexual intercourse.  - estradiol (ESTRACE) 0.1 MG/GM vaginal cream; Insert 1 g into the vagina every day. After 2 weeks insert every other day.  Dispense: 42.5 g; Refill: 1    2. Essential hypertension, benign (Primary)  Chronic, controlled.  /50 today.  She is being taken off losartan and is currently on 12.5 mg daily until completion of prescription.  Continue verapamil  mg daily.    3. Osteopenia of multiple sites  Chronic, uncontrolled. September 2023 DEXA showed osteopenia in the hip and spine with 10-year probability of hip fracture 6.9% and major osteoporotic fracture 28.9%. She was prescribed Fosamax last December but never received the prescription. Fosamax 70 mg weekly was resent and repeat DEXA due in September.  - alendronate (FOSAMAX) 70 MG Tab; Take 1 Tablet by mouth every 7 days.  Dispense: 12 Tablet; Refill: 3    4. Gastroesophageal reflux disease, unspecified whether esophagitis present  5. Rosado's esophagus without dysplasia  Chronic.  Found on EGD in Nov 2022 and scheduled for upcoming EGD in July.  Continue omeprazole 20 mg daily.          Return in about 4 months (around 10/8/2025), or if symptoms worsen or fail to improve.    Verbal consent was acquired by the patient to use [employer] ambient listening note generation during this visit: Yes.    Please note that this dictation was created using voice recognition software. I have made every reasonable attempt to correct obvious errors, but I expect that there are errors of grammar and possibly content that I did not discover before finalizing the note.

## 2025-06-19 ENCOUNTER — PHARMACY VISIT (OUTPATIENT)
Dept: PHARMACY | Facility: MEDICAL CENTER | Age: 70
End: 2025-06-19
Payer: COMMERCIAL

## 2025-06-19 DIAGNOSIS — E78.5 DYSLIPIDEMIA: ICD-10-CM

## 2025-06-19 PROCEDURE — RXMED WILLOW AMBULATORY MEDICATION CHARGE: Performed by: PHYSICIAN ASSISTANT

## 2025-06-19 RX ORDER — ATORVASTATIN CALCIUM 40 MG/1
40 TABLET, FILM COATED ORAL DAILY
Qty: 90 TABLET | Refills: 3 | Status: CANCELLED | OUTPATIENT
Start: 2025-06-19

## 2025-06-20 DIAGNOSIS — J32.9 CHRONIC SINUSITIS, UNSPECIFIED LOCATION: ICD-10-CM

## 2025-06-25 NOTE — TELEPHONE ENCOUNTER
Received request via: Pharmacy    Was the patient seen in the last year in this department? Yes    Does the patient have an active prescription (recently filled or refills available) for medication(s) requested? No    [facility] Name[facility]    Does the patient have [facility] Plus and need 100-day supply? (This applies to ALL medications) Yes, quantity updated to 100 days

## 2025-06-26 ENCOUNTER — OFFICE VISIT (OUTPATIENT)
Dept: PHYSICAL MEDICINE AND REHAB | Facility: MEDICAL CENTER | Age: 70
End: 2025-06-26
Payer: MEDICARE

## 2025-06-26 VITALS
BODY MASS INDEX: 23.07 KG/M2 | DIASTOLIC BLOOD PRESSURE: 72 MMHG | HEIGHT: 60 IN | HEART RATE: 55 BPM | TEMPERATURE: 97.9 F | SYSTOLIC BLOOD PRESSURE: 140 MMHG | OXYGEN SATURATION: 97 % | WEIGHT: 117.5 LBS

## 2025-06-26 DIAGNOSIS — M47.816 LUMBAR SPONDYLOSIS: ICD-10-CM

## 2025-06-26 DIAGNOSIS — M53.3 SACROILIAC JOINT DYSFUNCTION OF RIGHT SIDE: ICD-10-CM

## 2025-06-26 DIAGNOSIS — M54.16 RIGHT LUMBAR RADICULITIS: Primary | ICD-10-CM

## 2025-06-26 DIAGNOSIS — M48.061 NEUROFORAMINAL STENOSIS OF LUMBAR SPINE: ICD-10-CM

## 2025-06-26 DIAGNOSIS — G89.29 CHRONIC BILATERAL LOW BACK PAIN, UNSPECIFIED WHETHER SCIATICA PRESENT: ICD-10-CM

## 2025-06-26 DIAGNOSIS — M54.50 CHRONIC BILATERAL LOW BACK PAIN, UNSPECIFIED WHETHER SCIATICA PRESENT: ICD-10-CM

## 2025-06-26 DIAGNOSIS — Z91.81 RISK FOR FALLS: ICD-10-CM

## 2025-06-26 RX ORDER — IPRATROPIUM BROMIDE 42 UG/1
2 SPRAY, METERED NASAL 4 TIMES DAILY
Qty: 15 ML | Refills: 2 | Status: SHIPPED | OUTPATIENT
Start: 2025-06-26

## 2025-06-26 ASSESSMENT — PATIENT HEALTH QUESTIONNAIRE - PHQ9
SUM OF ALL RESPONSES TO PHQ QUESTIONS 1-9: 8
CLINICAL INTERPRETATION OF PHQ2 SCORE: 2
5. POOR APPETITE OR OVEREATING: 1 - SEVERAL DAYS

## 2025-06-26 ASSESSMENT — PAIN SCALES - GENERAL: PAINLEVEL_OUTOF10: 5=MODERATE PAIN

## 2025-06-26 ASSESSMENT — FIBROSIS 4 INDEX: FIB4 SCORE: 2.79

## 2025-06-26 NOTE — H&P (VIEW-ONLY)
Verbal consent was acquired by the patient to use [employer] ambient listening note generation during this visit Yes     Follow-up patient Note    Interventional Pain and Spine  Physiatry (Physical Medicine and Rehabilitation)     Patient Name: Haley Hawthorne  : 1955  Date of service: 2025    Chief Complaint:   Chief Complaint   Patient presents with    Follow-Up     Hip and back pain       HISTORY  Please see new patient note by Dr. Horne for more details.     HPI  Today's visit   Haley Hawthorne ( 1955) is a female with The primary encounter diagnosis was Right lumbar radiculitis. Diagnoses of Sacroiliac joint dysfunction of right side, Lumbar spondylosis, Chronic bilateral low back pain, unspecified whether sciatica present, Risk for falls, and Neuroforaminal stenosis of lumbar spine were also pertinent to this visit.    History of Present Illness  The patient is a 70-year-old female who presents for evaluation of right buttock and hip pain.    Right Buttock and Hip Pain  - Severe fatigue due to persistent pain in her right buttock and hip, disrupting sleep and causing nighttime awakenings  - Tingling extending downwards mainly in her lateral thigh  - Symptoms began 6-7 months ago after a fall on rocks  - Pain intensity has escalated recently, causing sleep disturbances for the past month  - Pain persists throughout the day as an ache  - Minimal leg weakness and no numbness    Pain Management  - Manages pain with methocarbamol, which is effective and well-tolerated  - Previous sacroiliac joint injection provided significant relief  - Interested in another injection for significant relief  - Would prefer not to take gabapentin which can make her drowsy      Pain severity 5/10 currently  Pt denies new numbness, tingling, or weakness.    Procedure history:  - 3/6/25 right Sacroiliac joint injection with sedation-resolution of index pain      ROS:   Red Flags ROS:   Fever,  Chills, Sweats: Denies  Involuntary Weight Loss: Denies  Bladder Incontinence: Denies  Bowel Incontinence: denies  Saddle Anesthesia: Denies    All other systems reviewed and negative.     PMHx:   Past Medical History:   Diagnosis Date    Acute postoperative abdominal pain 08/08/2019    Allergy     Appendicitis, acute 08/08/2019    Arrhythmia     sinus bradycardia    Arthritis     hands/ hip-rheumatoid    Asthma     Back pain     Rosado's esophagus     Blood transfusion without reported diagnosis     BRCA1 negative     BRCA2 negative     Breast cancer (HCC) 03/03/2015    Bronchitis     years ago    CAD (coronary artery disease)     Cancer (HCC) 2015    dcis/ right breast, radiation    Cataract     Chickenpox     Depression     Foot drop, right 04/12/2022    GERD (gastroesophageal reflux disease)     Heart burn     Hiatus hernia syndrome 2019    colonoscopy    High cholesterol     Hypertension     Indigestion     Left hip pain 06/22/2022    Nasal drainage     Osteoporosis     Other chest pain 06/26/2019    Pain 06/2018    hands    Painful respiration 01/10/2022    Pneumonia 2016    Snoring     Tonsillitis        PSHx:   Past Surgical History:   Procedure Laterality Date    NM INJECTION,SACROILIAC JOINT Right 03/06/2025    Procedure: RIGHT sacroiliac joint injection with fluoroscopic guidance;  Surgeon: Soraya Horne M.D.;  [facility];  Service: Pain Management    NM REPAIR OF NASAL SEPTUM N/A 05/28/2020    Procedure: SEPTOPLASTY, NOSE;  Surgeon: Arnulfo Salgado M.D.;  Location: SURGERY [facility] DAY [facility];  Service: Ent    TURBINOPLASTY Bilateral 05/28/2020    Procedure: TURBINOPLASTY- SUBMUCOSAL APPROACH;  Surgeon: Arnulfo Salgado M.D.;  Location: SURGERY SAME DAY [facility];  Service: Ent    NM LAP,APPENDECTOMY  08/08/2019    Procedure: APPENDECTOMY, LAPAROSCOPIC;  Surgeon: John Cunningham M.D.;  Location: SURGERY [facility];  Service: General    GANGLION EXCISION Left  09/25/2018    Procedure: GANGLION EXCISION-  CYST;  Surgeon: Hieu Salamanca M.D.;  Location: SURGERY [facility];  Service: Orthopedics    FINGER ARTHROPLASTY Left 09/25/2018    Procedure: FINGER ARTHROPLASTY- CARPOMETACARPAL;  Surgeon: Hieu Salamanca M.D.;  Location: [facility];  Service: Orthopedics    REPAIR, TENDON, LOWER EXTREMITY, USING TENDON GRAFT Left 09/25/2018    Procedure: FLEXOR TENDON REPAIR-  CARPI RADIALIS;  Surgeon: Hieu Salamanca M.D.;  Location: SURGERY [facility];  Service: Orthopedics    FINGER ARTHROPLASTY Right 06/05/2018    Procedure: FINGER ARTHROPLASTY - CARPOMETACARPAL;  Surgeon: Hieu Salamanca M.D.;  Location: [facility];  Service: Orthopedics    TENDON TRANSFER  06/05/2018    Procedure: TENDON TRANSFER - FLEXOR CARPI RADIALIS;  Surgeon: Hieu Salamanca M.D.;  Location: [facility];  Service: Orthopedics    PARTIAL MASTECTOMY  04/21/2015    Performed by Anna Licona M.D. at SURGERY SAME DAY [facility]    LUMPECTOMY  04/21/2015    Performed by Anan Licona M.D. at SURGERY SAME DAY [facility]    CARPAL TUNNEL RELEASE  2013    HYSTERECTOMY LAPAROSCOPY  2006    ABDOMINAL EXPLORATION      BRCA1&2 GEN FULL SEQ DUP/DEL      BREAST BIOPSY      left benign biopsy    OTHER ABDOMINAL SURGERY  2019    Appnedicitus    MN RADIATION THERAPY PLAN SIMPLE  2015    TONSILLECTOMY         Family Hx:   Family History   Problem Relation Age of Onset    Stroke Mother     Heart Disease Mother     Hypertension Mother     Cancer Sister         Breast cancer    Heart Disease Brother     Cancer Brother         Lung cancer    Lung Disease Brother     Heart Disease Brother     Sleep Apnea Son     Cancer Maternal Grandmother         Cervical cancer    Cancer Paternal Grandmother         Lung cancer    Heart Disease Brother     Heart Disease Brother     Stroke Brother     Stroke Brother        Social Hx:  Social History     Socioeconomic History    Marital  status:      Spouse name: Not on file    Number of children: 3    Years of education: Not on file    Highest education level: GED or equivalent   Occupational History    Occupation: Retired     Employer: Retired   Tobacco Use    Smoking status: Former     Current packs/day: 0.00     Average packs/day: 1 pack/day for 10.0 years (10.0 ttl pk-yrs)     Types: Cigarettes     Start date: 1969     Quit date: 1979     Years since quittin.5    Smokeless tobacco: Never   Vaping Use    Vaping status: Never Used   Substance and Sexual Activity    Alcohol use: Yes     Comment: Wine 2 xs a week - quit all together    Drug use: No    Sexual activity: Not Currently     Partners: Male   Other Topics Concern    Not on file   Social History Narrative    Not on file     Social Drivers of Health     Financial Resource Strain: Low Risk  (2024)    Overall Financial Resource Strain (CARDIA)     Difficulty of Paying Living Expenses: Not very hard   Food Insecurity: No Food Insecurity (2024)    Hunger Vital Sign     Worried About Running Out of Food in the Last Year: Never true     Ran Out of Food in the Last Year: Never true   Transportation Needs: No Transportation Needs (2024)    PRAPARE - Transportation     Lack of Transportation (Medical): No     Lack of Transportation (Non-Medical): No   Physical Activity: Insufficiently Active (2024)    Exercise Vital Sign     Days of Exercise per Week: 5 days     Minutes of Exercise per Session: 20 min   Stress: Stress Concern Present (2024)    [employer] - Occupational Stress Questionnaire     Feeling of Stress : To some extent   Social Connections: Moderately Isolated (2024)    Social Connection and Isolation Panel [NHANES]     Frequency of Communication with Friends and Family: Once a week     Frequency of Social Gatherings with Friends and Family: Once a week     Attends Gnosticism Services: More than 4 times per  year     Active Member of Clubs or Organizations: No     Attends Club or Organization Meetings: Never     Marital Status:    Intimate Partner Violence: Not on file   Housing Stability: Low Risk  (12/17/2024)    Housing Stability Vital Sign     Unable to Pay for Housing in the Last Year: No     Number of Times Moved in the Last Year: 0     Homeless in the Last Year: No       Allergies:  Allergies   Allergen Reactions    Sulfa Drugs Hives       Medications: reviewed on epic.   Outpatient Medications Marked as Taking for the 6/26/25 encounter (Office Visit) with Soraya Horne M.D.   Medication Sig Dispense Refill    verapamil ER (CALAN SR) 240 MG Tab CR Take 1 Tablet by mouth every day. 90 Tablet 1    estradiol (ESTRACE) 0.1 MG/GM vaginal cream Insert 1 g into the vagina every day. After 2 weeks insert every other day. 42.5 g 1    alendronate (FOSAMAX) 70 MG Tab Take 1 Tablet by mouth every 7 days. 12 Tablet 3    fluticasone (FLOVENT HFA) 44 MCG/ACT Aerosol Inhale 2 Puffs by mouth 2 times a day as needed (Shortness of breath). 10.6 g 0    ipratropium (ATROVENT) 0.06 % Solution Administer 2 Sprays into affected nostril(S) 4 times a day. 15 mL 0    paroxetine (PAXIL) 40 MG tablet Take 1 Tablet by mouth every morning. 90 Tablet 2    omeprazole (PRILOSEC) 20 MG delayed-release capsule Take 1 Capsule by mouth every morning. 100 Capsule 0    aspirin 81 MG EC tablet Take 81 mg by mouth every day.      verapamil ER (CALAN-SR) 240 MG Tab CR TAKE 1 TABLET BY MOUTH EVERY DAY (Patient taking differently: Take 240 mg by mouth every morning.) 90 Tablet 3    atorvastatin (LIPITOR) 40 MG Tab Take 1 Tablet by mouth every day. (Patient taking differently: Take 40 mg by mouth every morning.) 90 Tablet 3        Current Outpatient Medications on File Prior to Visit   Medication Sig Dispense Refill    verapamil ER (CALAN SR) 240 MG Tab CR Take 1 Tablet by mouth every day. 90 Tablet 1    estradiol (ESTRACE) 0.1 MG/GM vaginal cream  Insert 1 g into the vagina every day. After 2 weeks insert every other day. 42.5 g 1    alendronate (FOSAMAX) 70 MG Tab Take 1 Tablet by mouth every 7 days. 12 Tablet 3    fluticasone (FLOVENT HFA) 44 MCG/ACT Aerosol Inhale 2 Puffs by mouth 2 times a day as needed (Shortness of breath). 10.6 g 0    ipratropium (ATROVENT) 0.06 % Solution Administer 2 Sprays into affected nostril(S) 4 times a day. 15 mL 0    paroxetine (PAXIL) 40 MG tablet Take 1 Tablet by mouth every morning. 90 Tablet 2    omeprazole (PRILOSEC) 20 MG delayed-release capsule Take 1 Capsule by mouth every morning. 100 Capsule 0    aspirin 81 MG EC tablet Take 81 mg by mouth every day.      verapamil ER (CALAN-SR) 240 MG Tab CR TAKE 1 TABLET BY MOUTH EVERY DAY (Patient taking differently: Take 240 mg by mouth every morning.) 90 Tablet 3    atorvastatin (LIPITOR) 40 MG Tab Take 1 Tablet by mouth every day. (Patient taking differently: Take 40 mg by mouth every morning.) 90 Tablet 3    methocarbamol (ROBAXIN) 500 MG Tab Take 1 Tablet by mouth 3 times a day as needed (back pain). (Patient not taking: Reported on 6/26/2025) 30 Tablet 1    losartan (COZAAR) 25 MG Tab Take 1 Tablet by mouth every day. (Patient taking differently: Take 25 mg by mouth every morning. Taking 12.5 in the morning) 90 Tablet 3     No current facility-administered medications on file prior to visit.         EXAMINATION     Physical Exam:   BP (!) 140/72   Pulse (!) 55   Temp 36.6 °C (97.9 °F) (Temporal)   Ht 1.524 m (5')   Wt 53.3 kg (117 lb 8.1 oz)   SpO2 97%     Constitutional:   Body Habitus: Body mass index is 22.95 kg/m².  Cooperation: Fully cooperates with exam  Appearance: Well-groomed, well-nourished.    Eyes: No scleral icterus to suggest severe liver disease, no proptosis to suggest severe hyperthyroidism    ENT -no obvious auditory deficits, no noticeable facial droop     Skin -no rashes or lesions noted     Respiratory-  breathing comfortably on room air, no audible  wheezing    Cardiovascular-distal extremities warm and well perfused.  No lower extremity edema is noted.     Gastrointestinal - no obvious abdominal masses, non-distended    Psychiatric- alert and oriented ×3. Normal affect.     Gait stable, steady    Thoracic/Lumbar Spine/Sacral Spine/Hips   Inspection: No evidence of atrophy in bilateral lower extremities throughout     Facet loading maneuver negative bilaterally    Palpation:   Tenderness to palpation over the right glute.     Lumbar spine /hip provocative exam maneuvers  Straight leg raise positive on right, negative on left  FADIR test negative bilaterally    SI joint tests  ROSEMARY test negative bilaterally    Key points for the international standards for neurological classification of spinal cord injury (ISNCSCI) to light touch.   Dermatome R L   L2 2 2   L3 2 2   L4 2 2   L5 2 2   S1 2 2   S2 2 2       Motor Exam Lower Extremities  ? Myotome R L   Hip flexion L2 5 5   Knee extension L3 5 5   Ankle dorsiflexion L4 5 5   Toe extension L5 5 5   Ankle plantarflexion S1 5 5         MEDICAL DECISION MAKING    Medical records review: see under HPI section.     DATA    Labs: Personally reviewed at today's visit:     Lab Results   Component Value Date/Time    SODIUM 141 04/25/2025 11:40 AM    POTASSIUM 4.4 04/25/2025 11:40 AM    CHLORIDE 107 04/25/2025 11:40 AM    CO2 25 04/25/2025 11:40 AM    ANION 9.0 04/25/2025 11:40 AM    GLUCOSE 93 04/25/2025 11:40 AM    BUN 18 04/25/2025 11:40 AM    CREATININE 0.88 04/25/2025 11:40 AM    CALCIUM 9.9 04/25/2025 11:40 AM    ASTSGOT 29 04/25/2025 11:40 AM    ALTSGPT 21 04/25/2025 11:40 AM    TBILIRUBIN 0.5 04/25/2025 11:40 AM    ALBUMIN 4.3 04/25/2025 11:40 AM    TOTPROTEIN 6.7 04/25/2025 11:40 AM    GLOBULIN 2.4 04/25/2025 11:40 AM    AGRATIO 1.8 04/25/2025 11:40 AM       Lab Results   Component Value Date/Time    PROTHROMBTM 13.8 06/13/2023 01:46 PM    INR 1.07 06/13/2023 01:46 PM        Lab Results   Component Value Date/Time     WBC 2.8 (L) 04/25/2025 11:40 AM    RBC 4.27 04/25/2025 11:40 AM    HEMOGLOBIN 12.8 04/25/2025 11:40 AM    HEMATOCRIT 39.7 04/25/2025 11:40 AM    MCV 93.0 04/25/2025 11:40 AM    MCH 30.0 04/25/2025 11:40 AM    MCHC 32.2 04/25/2025 11:40 AM    MPV 10.8 04/25/2025 11:40 AM    NEUTSPOLYS 56.70 04/25/2025 11:40 AM    LYMPHOCYTES 29.50 04/25/2025 11:40 AM    MONOCYTES 10.30 04/25/2025 11:40 AM    EOSINOPHILS 2.80 04/25/2025 11:40 AM    BASOPHILS 0.70 04/25/2025 11:40 AM        Lab Results   Component Value Date/Time    HBA1C 5.5 04/19/2024 10:02 AM        Imaging:   I personally reviewed following images, these are my reads  MRI lumbar spine 2/25/2025  L2-3 there is mild canal narrowing.  At L3-4 there is mild bilateral neuroforaminal stenosis.  At L5-S1 there is moderate left neuroforaminal stenosis.  Facet arthropathy at bilateral lower lumbar levels. See formal radiology report for further details.      X-ray hip 2/7/2025  Mild OA of bilateral hips    IMAGING radiology reads. I reviewed the following radiology reads       Results for orders placed during the hospital encounter of 02/03/23    MR-BRAIN-W/O    Impression  1.  There is no acute infarct.  2.  Mild chronic microvascular ischemic disease.  3.  Mild cerebral volume loss.             Results for orders placed during the hospital encounter of 11/20/07    MR-CERVICAL SPINE-W/O    Impression  IMPRESSION:    1. DEGENERATIVE DISC AND ARTICULAR PILLAR ARTHROPATHY, PARTICULARLY AT  C5-6 WHERE THERE IS MILD CANAL STENOSIS, MODERATE LEFT-SIDED FORAMINAL  STENOSIS, AND MILD-TO-MODERATE RIGHT-SIDED FORAMINAL STENOSIS.    2. MILD LATERAL VERTEBRAL SPURRING C3-4 WITH VERY MILD NEURAL FORAMINAL  COMPROMISE.    GERENÉE/paxton      Read By ASIA PERRIN MD on Nov 21 2007  8:27AM  : PAXTON Transcription Date: Nov 21 2007 12:53PM  THIS DOCUMENT HAS BEEN ELECTRONICALLY SIGNED BY: ASIA PERRIN MD on  Nov 21 2007  4:36PM      Results for orders placed during the  hospital encounter of 02/25/25    MR-LUMBAR SPINE-W/O    Impression  Moderate left foraminal narrowing at L5-S1, stable.    There is no significant spinal canal stenosis.        Results for orders placed during the hospital encounter of 02/25/25    MR-LUMBAR SPINE-W/O    Impression  Moderate left foraminal narrowing at L5-S1, stable.    There is no significant spinal canal stenosis.                                       Results for orders placed during the hospital encounter of 11/06/07    DX-CERVICAL SPINE-2 OR 3 VIEWS    Impression  IMPRESSION:    1. MODERATE DEGENERATIVE DISC DISEASE IS PRESENT AT C5-6.    2. MINIMAL SPONDYLOLISTHESIS AT C7-T1.    3. NO ACUTE FRACTURE OR DISLOCATION IS IDENTIFIED ON THREE VIEWS OF THE  CERVICAL SPINE.    TRB/wds      Read By MEGAN ROBERTSON MD on Nov 6 2007  1:33PM  : JEREMY Transcription Date: Nov 6 2007  6:44PM  THIS DOCUMENT HAS BEEN ELECTRONICALLY SIGNED BY: MEGAN ROBERTSON MD on  Nov 7 2007  8:25AM     Results for orders placed during the hospital encounter of 01/20/21    DX-CHEST-2 VIEWS    Impression  No acute cardiopulmonary abnormality.            Results for orders placed during the hospital encounter of 05/19/04    DX-HAND 3+    Impression  IMPRESSION:      1.   NO ACUTE ABNORMALITIES OF THE RIGHT HAND.              READING DR:        MEGAN ROBERTSON MD  READING DATE:      May 19 2004  1:28PM  REPORT STATUS:     FINAL REPORT    TRANSCRIPTION CODE:         LVD  TRANSCRIPTION DATE:         May 20 2004  4:22AM    THIS DOCUMENT HAS BEEN ELECTRONICALLY SIGNED BY:  MEGAN ROBERTSON MD -  May 20 2004  8:28AM            Results for orders placed during the hospital encounter of 09/19/23    DX-LUMBAR SPINE-2 OR 3 VIEWS    Impression  Mild worsening moderate spondylosis   Results for orders placed during the hospital encounter of 10/31/23    DX-LUMBAR SPINE-4+ VIEWS    Impression  1.  Mild scoliosis.  2.  Moderate multilevel degenerative change of lumbar  spine.  3.  No fracture or subluxation.  4.  No evidence of instability.                        Diagnosis  Visit Diagnoses     ICD-10-CM   1. Right lumbar radiculitis  M54.16   2. Sacroiliac joint dysfunction of right side  M53.3   3. Lumbar spondylosis  M47.816   4. Chronic bilateral low back pain, unspecified whether sciatica present  M54.50    G89.29   5. Risk for falls  Z91.81   6. Neuroforaminal stenosis of lumbar spine  M48.061           ASSESSMENT AND PLAN:  Haley Hawthorne (: 1955) is a female with      Haley was seen today for follow-up.    Diagnoses and all orders for this visit:    Right lumbar radiculitis  -     Pain Hospital Procedure; Future    Sacroiliac joint dysfunction of right side    Lumbar spondylosis    Chronic bilateral low back pain, unspecified whether sciatica present    Risk for falls  -     Patient identified as fall risk.  Appropriate orders and counseling given.    Neuroforaminal stenosis of lumbar spine              PLAN  Physical Therapy: The patient has done at least 6 weeks of a provider driven physical therapy program for this problem in the past.  Advised patient to continue exercise program as able.  Discussed referral to physical therapy which the patient declined today     Diagnostic workup: no new imaging needed at this time       Medications:   -Discussed consideration of gabapentin, she would like to defer this at this time in favor of an injection  -Okay to continue Robaxin as per PCP  - Status post discontinuation of Mobic  due to GI upset.   I reviewed her latest GFR which was 70 on 2025.  Minimize NSAIDs as much as possible to minimize the risk  of worsening renal function     Interventions:   - right L5-S1 transforaminal epidural steroid injection under fluoroscopic guidance.  This will be done for diagnostic and therapeutic purposes.  I suspect that the patient has inflammation of her right L5 nerve root contributing to her symptoms.  The  patient prefers to have this with sedation given her anticipation of difficulty tolerating injections without sedation.  The risks, benefits, and alternatives to this procedure were discussed and the patient wishes to proceed with the procedure. Risks include but are not limited to damage to surrounding structures, infection, bleeding, worsening of pain which can be permanent, and weakness which can be permanent. Benefits include pain relief and improved function. Alternatives include not doing the procedure.    -Right sacroiliac joint injection under fluoroscopic guidance as needed  -Discussed diagnostic lumbar medial branch blocks targeting Right L4-L5 and L5-S1 facet joints, defer for now in favor of right sacroiliac joint injection first    Follow-up: 3 weeks after epidural    Orders Placed This [facility] Procedure    Patient identified as fall risk.  Appropriate orders and counseling given.       Soraya Horne MD  Interventional Pain and Spine  Physical Medicine and Rehabilitation  [facility]      The above note documents my personal evaluation of this patient. In addition, I have reviewed and confirmed with the patient and MA the supportive information documented in today's Patient Health Questionnaire and Office Note.     Please note that this dictation was created using voice recognition software. I have made every reasonable attempt to correct obvious errors, but I expect that there are errors of grammar and possibly content that I did not discover before finalizing the note.

## 2025-06-26 NOTE — PROGRESS NOTES
Verbal consent was acquired by the patient to use [employer] ambient listening note generation during this visit Yes     Follow-up patient Note    Interventional Pain and Spine  Physiatry (Physical Medicine and Rehabilitation)     Patient Name: Haley Hawthorne  : 1955  Date of service: 2025    Chief Complaint:   Chief Complaint   Patient presents with    Follow-Up     Hip and back pain       HISTORY  Please see new patient note by Dr. Horne for more details.     HPI  Today's visit   Haley Hawthorne ( 1955) is a female with The primary encounter diagnosis was Right lumbar radiculitis. Diagnoses of Sacroiliac joint dysfunction of right side, Lumbar spondylosis, Chronic bilateral low back pain, unspecified whether sciatica present, Risk for falls, and Neuroforaminal stenosis of lumbar spine were also pertinent to this visit.    History of Present Illness  The patient is a 70-year-old female who presents for evaluation of right buttock and hip pain.    Right Buttock and Hip Pain  - Severe fatigue due to persistent pain in her right buttock and hip, disrupting sleep and causing nighttime awakenings  - Tingling extending downwards mainly in her lateral thigh  - Symptoms began 6-7 months ago after a fall on rocks  - Pain intensity has escalated recently, causing sleep disturbances for the past month  - Pain persists throughout the day as an ache  - Minimal leg weakness and no numbness    Pain Management  - Manages pain with methocarbamol, which is effective and well-tolerated  - Previous sacroiliac joint injection provided significant relief  - Interested in another injection for significant relief  - Would prefer not to take gabapentin which can make her drowsy      Pain severity 5/10 currently  Pt denies new numbness, tingling, or weakness.    Procedure history:  - 3/6/25 right Sacroiliac joint injection with sedation-resolution of index pain      ROS:   Red Flags ROS:   Fever,  Chills, Sweats: Denies  Involuntary Weight Loss: Denies  Bladder Incontinence: Denies  Bowel Incontinence: denies  Saddle Anesthesia: Denies    All other systems reviewed and negative.     PMHx:   Past Medical History:   Diagnosis Date    Acute postoperative abdominal pain 08/08/2019    Allergy     Appendicitis, acute 08/08/2019    Arrhythmia     sinus bradycardia    Arthritis     hands/ hip-rheumatoid    Asthma     Back pain     Rosado's esophagus     Blood transfusion without reported diagnosis     BRCA1 negative     BRCA2 negative     Breast cancer (HCC) 03/03/2015    Bronchitis     years ago    CAD (coronary artery disease)     Cancer (HCC) 2015    dcis/ right breast, radiation    Cataract     Chickenpox     Depression     Foot drop, right 04/12/2022    GERD (gastroesophageal reflux disease)     Heart burn     Hiatus hernia syndrome 2019    colonoscopy    High cholesterol     Hypertension     Indigestion     Left hip pain 06/22/2022    Nasal drainage     Osteoporosis     Other chest pain 06/26/2019    Pain 06/2018    hands    Painful respiration 01/10/2022    Pneumonia 2016    Snoring     Tonsillitis        PSHx:   Past Surgical History:   Procedure Laterality Date    NM INJECTION,SACROILIAC JOINT Right 03/06/2025    Procedure: RIGHT sacroiliac joint injection with fluoroscopic guidance;  Surgeon: Soraya Horne M.D.;  [facility];  Service: Pain Management    NM REPAIR OF NASAL SEPTUM N/A 05/28/2020    Procedure: SEPTOPLASTY, NOSE;  Surgeon: Arnulfo Salgado M.D.;  Location: SURGERY [facility] DAY [facility];  Service: Ent    TURBINOPLASTY Bilateral 05/28/2020    Procedure: TURBINOPLASTY- SUBMUCOSAL APPROACH;  Surgeon: Arnulfo Salgado M.D.;  Location: SURGERY SAME DAY [facility];  Service: Ent    NM LAP,APPENDECTOMY  08/08/2019    Procedure: APPENDECTOMY, LAPAROSCOPIC;  Surgeon: John Cunningham M.D.;  Location: SURGERY [facility];  Service: General    GANGLION EXCISION Left  09/25/2018    Procedure: GANGLION EXCISION-  CYST;  Surgeon: Hieu Salamanca M.D.;  Location: SURGERY [facility];  Service: Orthopedics    FINGER ARTHROPLASTY Left 09/25/2018    Procedure: FINGER ARTHROPLASTY- CARPOMETACARPAL;  Surgeon: Hieu Salamanca M.D.;  Location: [facility];  Service: Orthopedics    REPAIR, TENDON, LOWER EXTREMITY, USING TENDON GRAFT Left 09/25/2018    Procedure: FLEXOR TENDON REPAIR-  CARPI RADIALIS;  Surgeon: Hieu Salamanca M.D.;  Location: SURGERY [facility];  Service: Orthopedics    FINGER ARTHROPLASTY Right 06/05/2018    Procedure: FINGER ARTHROPLASTY - CARPOMETACARPAL;  Surgeon: Hieu Salamanca M.D.;  Location: [facility];  Service: Orthopedics    TENDON TRANSFER  06/05/2018    Procedure: TENDON TRANSFER - FLEXOR CARPI RADIALIS;  Surgeon: Hieu Salamanca M.D.;  Location: [facility];  Service: Orthopedics    PARTIAL MASTECTOMY  04/21/2015    Performed by Anna Licona M.D. at SURGERY SAME DAY [facility]    LUMPECTOMY  04/21/2015    Performed by Anna Licona M.D. at SURGERY SAME DAY [facility]    CARPAL TUNNEL RELEASE  2013    HYSTERECTOMY LAPAROSCOPY  2006    ABDOMINAL EXPLORATION      BRCA1&2 GEN FULL SEQ DUP/DEL      BREAST BIOPSY      left benign biopsy    OTHER ABDOMINAL SURGERY  2019    Appnedicitus    ME RADIATION THERAPY PLAN SIMPLE  2015    TONSILLECTOMY         Family Hx:   Family History   Problem Relation Age of Onset    Stroke Mother     Heart Disease Mother     Hypertension Mother     Cancer Sister         Breast cancer    Heart Disease Brother     Cancer Brother         Lung cancer    Lung Disease Brother     Heart Disease Brother     Sleep Apnea Son     Cancer Maternal Grandmother         Cervical cancer    Cancer Paternal Grandmother         Lung cancer    Heart Disease Brother     Heart Disease Brother     Stroke Brother     Stroke Brother        Social Hx:  Social History     Socioeconomic History    Marital  status:      Spouse name: Not on file    Number of children: 3    Years of education: Not on file    Highest education level: GED or equivalent   Occupational History    Occupation: Retired     Employer: Retired   Tobacco Use    Smoking status: Former     Current packs/day: 0.00     Average packs/day: 1 pack/day for 10.0 years (10.0 ttl pk-yrs)     Types: Cigarettes     Start date: 1969     Quit date: 1979     Years since quittin.5    Smokeless tobacco: Never   Vaping Use    Vaping status: Never Used   Substance and Sexual Activity    Alcohol use: Yes     Comment: Wine 2 xs a week - quit all together    Drug use: No    Sexual activity: Not Currently     Partners: Male   Other Topics Concern    Not on file   Social History Narrative    Not on file     Social Drivers of Health     Financial Resource Strain: Low Risk  (2024)    Overall Financial Resource Strain (CARDIA)     Difficulty of Paying Living Expenses: Not very hard   Food Insecurity: No Food Insecurity (2024)    Hunger Vital Sign     Worried About Running Out of Food in the Last Year: Never true     Ran Out of Food in the Last Year: Never true   Transportation Needs: No Transportation Needs (2024)    PRAPARE - Transportation     Lack of Transportation (Medical): No     Lack of Transportation (Non-Medical): No   Physical Activity: Insufficiently Active (2024)    Exercise Vital Sign     Days of Exercise per Week: 5 days     Minutes of Exercise per Session: 20 min   Stress: Stress Concern Present (2024)    [employer] [employer] - Occupational Stress Questionnaire     Feeling of Stress : To some extent   Social Connections: Moderately Isolated (2024)    Social Connection and Isolation Panel [NHANES]     Frequency of Communication with Friends and Family: Once a week     Frequency of Social Gatherings with Friends and Family: Once a week     Attends Holiness Services: More than 4 times per  year     Active Member of Clubs or Organizations: No     Attends Club or Organization Meetings: Never     Marital Status:    Intimate Partner Violence: Not on file   Housing Stability: Low Risk  (12/17/2024)    Housing Stability Vital Sign     Unable to Pay for Housing in the Last Year: No     Number of Times Moved in the Last Year: 0     Homeless in the Last Year: No       Allergies:  Allergies   Allergen Reactions    Sulfa Drugs Hives       Medications: reviewed on epic.   Outpatient Medications Marked as Taking for the 6/26/25 encounter (Office Visit) with Soraya Horne M.D.   Medication Sig Dispense Refill    verapamil ER (CALAN SR) 240 MG Tab CR Take 1 Tablet by mouth every day. 90 Tablet 1    estradiol (ESTRACE) 0.1 MG/GM vaginal cream Insert 1 g into the vagina every day. After 2 weeks insert every other day. 42.5 g 1    alendronate (FOSAMAX) 70 MG Tab Take 1 Tablet by mouth every 7 days. 12 Tablet 3    fluticasone (FLOVENT HFA) 44 MCG/ACT Aerosol Inhale 2 Puffs by mouth 2 times a day as needed (Shortness of breath). 10.6 g 0    ipratropium (ATROVENT) 0.06 % Solution Administer 2 Sprays into affected nostril(S) 4 times a day. 15 mL 0    paroxetine (PAXIL) 40 MG tablet Take 1 Tablet by mouth every morning. 90 Tablet 2    omeprazole (PRILOSEC) 20 MG delayed-release capsule Take 1 Capsule by mouth every morning. 100 Capsule 0    aspirin 81 MG EC tablet Take 81 mg by mouth every day.      verapamil ER (CALAN-SR) 240 MG Tab CR TAKE 1 TABLET BY MOUTH EVERY DAY (Patient taking differently: Take 240 mg by mouth every morning.) 90 Tablet 3    atorvastatin (LIPITOR) 40 MG Tab Take 1 Tablet by mouth every day. (Patient taking differently: Take 40 mg by mouth every morning.) 90 Tablet 3        Current Outpatient Medications on File Prior to Visit   Medication Sig Dispense Refill    verapamil ER (CALAN SR) 240 MG Tab CR Take 1 Tablet by mouth every day. 90 Tablet 1    estradiol (ESTRACE) 0.1 MG/GM vaginal cream  Insert 1 g into the vagina every day. After 2 weeks insert every other day. 42.5 g 1    alendronate (FOSAMAX) 70 MG Tab Take 1 Tablet by mouth every 7 days. 12 Tablet 3    fluticasone (FLOVENT HFA) 44 MCG/ACT Aerosol Inhale 2 Puffs by mouth 2 times a day as needed (Shortness of breath). 10.6 g 0    ipratropium (ATROVENT) 0.06 % Solution Administer 2 Sprays into affected nostril(S) 4 times a day. 15 mL 0    paroxetine (PAXIL) 40 MG tablet Take 1 Tablet by mouth every morning. 90 Tablet 2    omeprazole (PRILOSEC) 20 MG delayed-release capsule Take 1 Capsule by mouth every morning. 100 Capsule 0    aspirin 81 MG EC tablet Take 81 mg by mouth every day.      verapamil ER (CALAN-SR) 240 MG Tab CR TAKE 1 TABLET BY MOUTH EVERY DAY (Patient taking differently: Take 240 mg by mouth every morning.) 90 Tablet 3    atorvastatin (LIPITOR) 40 MG Tab Take 1 Tablet by mouth every day. (Patient taking differently: Take 40 mg by mouth every morning.) 90 Tablet 3    methocarbamol (ROBAXIN) 500 MG Tab Take 1 Tablet by mouth 3 times a day as needed (back pain). (Patient not taking: Reported on 6/26/2025) 30 Tablet 1    losartan (COZAAR) 25 MG Tab Take 1 Tablet by mouth every day. (Patient taking differently: Take 25 mg by mouth every morning. Taking 12.5 in the morning) 90 Tablet 3     No current facility-administered medications on file prior to visit.         EXAMINATION     Physical Exam:   BP (!) 140/72   Pulse (!) 55   Temp 36.6 °C (97.9 °F) (Temporal)   Ht 1.524 m (5')   Wt 53.3 kg (117 lb 8.1 oz)   SpO2 97%     Constitutional:   Body Habitus: Body mass index is 22.95 kg/m².  Cooperation: Fully cooperates with exam  Appearance: Well-groomed, well-nourished.    Eyes: No scleral icterus to suggest severe liver disease, no proptosis to suggest severe hyperthyroidism    ENT -no obvious auditory deficits, no noticeable facial droop     Skin -no rashes or lesions noted     Respiratory-  breathing comfortably on room air, no audible  wheezing    Cardiovascular-distal extremities warm and well perfused.  No lower extremity edema is noted.     Gastrointestinal - no obvious abdominal masses, non-distended    Psychiatric- alert and oriented ×3. Normal affect.     Gait stable, steady    Thoracic/Lumbar Spine/Sacral Spine/Hips   Inspection: No evidence of atrophy in bilateral lower extremities throughout     Facet loading maneuver negative bilaterally    Palpation:   Tenderness to palpation over the right glute.     Lumbar spine /hip provocative exam maneuvers  Straight leg raise positive on right, negative on left  FADIR test negative bilaterally    SI joint tests  ROSEMARY test negative bilaterally    Key points for the international standards for neurological classification of spinal cord injury (ISNCSCI) to light touch.   Dermatome R L   L2 2 2   L3 2 2   L4 2 2   L5 2 2   S1 2 2   S2 2 2       Motor Exam Lower Extremities  ? Myotome R L   Hip flexion L2 5 5   Knee extension L3 5 5   Ankle dorsiflexion L4 5 5   Toe extension L5 5 5   Ankle plantarflexion S1 5 5         MEDICAL DECISION MAKING    Medical records review: see under HPI section.     DATA    Labs: Personally reviewed at today's visit:     Lab Results   Component Value Date/Time    SODIUM 141 04/25/2025 11:40 AM    POTASSIUM 4.4 04/25/2025 11:40 AM    CHLORIDE 107 04/25/2025 11:40 AM    CO2 25 04/25/2025 11:40 AM    ANION 9.0 04/25/2025 11:40 AM    GLUCOSE 93 04/25/2025 11:40 AM    BUN 18 04/25/2025 11:40 AM    CREATININE 0.88 04/25/2025 11:40 AM    CALCIUM 9.9 04/25/2025 11:40 AM    ASTSGOT 29 04/25/2025 11:40 AM    ALTSGPT 21 04/25/2025 11:40 AM    TBILIRUBIN 0.5 04/25/2025 11:40 AM    ALBUMIN 4.3 04/25/2025 11:40 AM    TOTPROTEIN 6.7 04/25/2025 11:40 AM    GLOBULIN 2.4 04/25/2025 11:40 AM    AGRATIO 1.8 04/25/2025 11:40 AM       Lab Results   Component Value Date/Time    PROTHROMBTM 13.8 06/13/2023 01:46 PM    INR 1.07 06/13/2023 01:46 PM        Lab Results   Component Value Date/Time     WBC 2.8 (L) 04/25/2025 11:40 AM    RBC 4.27 04/25/2025 11:40 AM    HEMOGLOBIN 12.8 04/25/2025 11:40 AM    HEMATOCRIT 39.7 04/25/2025 11:40 AM    MCV 93.0 04/25/2025 11:40 AM    MCH 30.0 04/25/2025 11:40 AM    MCHC 32.2 04/25/2025 11:40 AM    MPV 10.8 04/25/2025 11:40 AM    NEUTSPOLYS 56.70 04/25/2025 11:40 AM    LYMPHOCYTES 29.50 04/25/2025 11:40 AM    MONOCYTES 10.30 04/25/2025 11:40 AM    EOSINOPHILS 2.80 04/25/2025 11:40 AM    BASOPHILS 0.70 04/25/2025 11:40 AM        Lab Results   Component Value Date/Time    HBA1C 5.5 04/19/2024 10:02 AM        Imaging:   I personally reviewed following images, these are my reads  MRI lumbar spine 2/25/2025  L2-3 there is mild canal narrowing.  At L3-4 there is mild bilateral neuroforaminal stenosis.  At L5-S1 there is moderate left neuroforaminal stenosis.  Facet arthropathy at bilateral lower lumbar levels. See formal radiology report for further details.      X-ray hip 2/7/2025  Mild OA of bilateral hips    IMAGING radiology reads. I reviewed the following radiology reads       Results for orders placed during the hospital encounter of 02/03/23    MR-BRAIN-W/O    Impression  1.  There is no acute infarct.  2.  Mild chronic microvascular ischemic disease.  3.  Mild cerebral volume loss.             Results for orders placed during the hospital encounter of 11/20/07    MR-CERVICAL SPINE-W/O    Impression  IMPRESSION:    1. DEGENERATIVE DISC AND ARTICULAR PILLAR ARTHROPATHY, PARTICULARLY AT  C5-6 WHERE THERE IS MILD CANAL STENOSIS, MODERATE LEFT-SIDED FORAMINAL  STENOSIS, AND MILD-TO-MODERATE RIGHT-SIDED FORAMINAL STENOSIS.    2. MILD LATERAL VERTEBRAL SPURRING C3-4 WITH VERY MILD NEURAL FORAMINAL  COMPROMISE.    GERENÉE/paxton      Read By ASIA PERRIN MD on Nov 21 2007  8:27AM  : PAXTON Transcription Date: Nov 21 2007 12:53PM  THIS DOCUMENT HAS BEEN ELECTRONICALLY SIGNED BY: ASIA PERRIN MD on  Nov 21 2007  4:36PM      Results for orders placed during the  hospital encounter of 02/25/25    MR-LUMBAR SPINE-W/O    Impression  Moderate left foraminal narrowing at L5-S1, stable.    There is no significant spinal canal stenosis.        Results for orders placed during the hospital encounter of 02/25/25    MR-LUMBAR SPINE-W/O    Impression  Moderate left foraminal narrowing at L5-S1, stable.    There is no significant spinal canal stenosis.                                       Results for orders placed during the hospital encounter of 11/06/07    DX-CERVICAL SPINE-2 OR 3 VIEWS    Impression  IMPRESSION:    1. MODERATE DEGENERATIVE DISC DISEASE IS PRESENT AT C5-6.    2. MINIMAL SPONDYLOLISTHESIS AT C7-T1.    3. NO ACUTE FRACTURE OR DISLOCATION IS IDENTIFIED ON THREE VIEWS OF THE  CERVICAL SPINE.    TRB/wds      Read By MEGAN ROBERTSON MD on Nov 6 2007  1:33PM  : JEREMY Transcription Date: Nov 6 2007  6:44PM  THIS DOCUMENT HAS BEEN ELECTRONICALLY SIGNED BY: MEGAN ROBERTSON MD on  Nov 7 2007  8:25AM     Results for orders placed during the hospital encounter of 01/20/21    DX-CHEST-2 VIEWS    Impression  No acute cardiopulmonary abnormality.            Results for orders placed during the hospital encounter of 05/19/04    DX-HAND 3+    Impression  IMPRESSION:      1.   NO ACUTE ABNORMALITIES OF THE RIGHT HAND.              READING DR:        MEGAN ROBERTSON MD  READING DATE:      May 19 2004  1:28PM  REPORT STATUS:     FINAL REPORT    TRANSCRIPTION CODE:         LVD  TRANSCRIPTION DATE:         May 20 2004  4:22AM    THIS DOCUMENT HAS BEEN ELECTRONICALLY SIGNED BY:  MEGAN ROBERTSON MD -  May 20 2004  8:28AM            Results for orders placed during the hospital encounter of 09/19/23    DX-LUMBAR SPINE-2 OR 3 VIEWS    Impression  Mild worsening moderate spondylosis   Results for orders placed during the hospital encounter of 10/31/23    DX-LUMBAR SPINE-4+ VIEWS    Impression  1.  Mild scoliosis.  2.  Moderate multilevel degenerative change of lumbar  spine.  3.  No fracture or subluxation.  4.  No evidence of instability.                        Diagnosis  Visit Diagnoses     ICD-10-CM   1. Right lumbar radiculitis  M54.16   2. Sacroiliac joint dysfunction of right side  M53.3   3. Lumbar spondylosis  M47.816   4. Chronic bilateral low back pain, unspecified whether sciatica present  M54.50    G89.29   5. Risk for falls  Z91.81   6. Neuroforaminal stenosis of lumbar spine  M48.061           ASSESSMENT AND PLAN:  Haley Hawthorne (: 1955) is a female with      Haley was seen today for follow-up.    Diagnoses and all orders for this visit:    Right lumbar radiculitis  -     Pain Hospital Procedure; Future    Sacroiliac joint dysfunction of right side    Lumbar spondylosis    Chronic bilateral low back pain, unspecified whether sciatica present    Risk for falls  -     Patient identified as fall risk.  Appropriate orders and counseling given.    Neuroforaminal stenosis of lumbar spine              PLAN  Physical Therapy: The patient has done at least 6 weeks of a provider driven physical therapy program for this problem in the past.  Advised patient to continue exercise program as able.  Discussed referral to physical therapy which the patient declined today     Diagnostic workup: no new imaging needed at this time       Medications:   -Discussed consideration of gabapentin, she would like to defer this at this time in favor of an injection  -Okay to continue Robaxin as per PCP  - Status post discontinuation of Mobic  due to GI upset.   I reviewed her latest GFR which was 70 on 2025.  Minimize NSAIDs as much as possible to minimize the risk  of worsening renal function     Interventions:   - right L5-S1 transforaminal epidural steroid injection under fluoroscopic guidance.  This will be done for diagnostic and therapeutic purposes.  I suspect that the patient has inflammation of her right L5 nerve root contributing to her symptoms.  The  patient prefers to have this with sedation given her anticipation of difficulty tolerating injections without sedation.  The risks, benefits, and alternatives to this procedure were discussed and the patient wishes to proceed with the procedure. Risks include but are not limited to damage to surrounding structures, infection, bleeding, worsening of pain which can be permanent, and weakness which can be permanent. Benefits include pain relief and improved function. Alternatives include not doing the procedure.    -Right sacroiliac joint injection under fluoroscopic guidance as needed  -Discussed diagnostic lumbar medial branch blocks targeting Right L4-L5 and L5-S1 facet joints, defer for now in favor of right sacroiliac joint injection first    Follow-up: 3 weeks after epidural    Orders Placed This [facility] Procedure    Patient identified as fall risk.  Appropriate orders and counseling given.       Soraya Horne MD  Interventional Pain and Spine  Physical Medicine and Rehabilitation  [facility]      The above note documents my personal evaluation of this patient. In addition, I have reviewed and confirmed with the patient and MA the supportive information documented in today's Patient Health Questionnaire and Office Note.     Please note that this dictation was created using voice recognition software. I have made every reasonable attempt to correct obvious errors, but I expect that there are errors of grammar and possibly content that I did not discover before finalizing the note.

## 2025-06-27 ENCOUNTER — PHARMACY VISIT (OUTPATIENT)
Dept: PHARMACY | Facility: MEDICAL CENTER | Age: 70
End: 2025-06-27
Payer: COMMERCIAL

## 2025-06-27 ENCOUNTER — APPOINTMENT (OUTPATIENT)
Dept: PHYSICAL MEDICINE AND REHAB | Facility: MEDICAL CENTER | Age: 70
End: 2025-06-27
Payer: MEDICARE

## 2025-06-27 PROCEDURE — RXMED WILLOW AMBULATORY MEDICATION CHARGE: Performed by: FAMILY MEDICINE

## 2025-06-27 NOTE — Clinical Note
REFERRAL APPROVAL NOTICE         Sent on June 27, 2025                   Haley Hawthorne  [address] [facility] [address]                   Dear Ms. Kathryn Hawthorne,    After a careful review of the medical information and benefit coverage, [facility] has processed your referral. See below for additional details.    If applicable, you must be actively enrolled with your insurance for coverage of the authorized service. If you have any questions regarding your coverage, please contact your insurance directly.    REFERRAL INFORMATION   Referral #:  [id number]  Referred-To Department    Referred-By Provider:  Pain Management    Soraya Horne M.D.   Pain Management       [address]  [address]  [facility] [id number] [phone] [address] [facility] [id number] [phone]    Referral Start Date:  06/26/2025  Referral End Date:   08/27/2025             SCHEDULING  If you do not already have an appointment, please call [phone] to make an appointment.     MORE INFORMATION  If you do not already have a [employer] account, sign up at: [email or web address]  You can access your medical information, make appointments, see lab results, billing information, and more.  If you have questions regarding this referral, please contact  the [facility] Referrals department at:             [phone]. Monday - Friday 8:00AM - 5:00PM.     Sincerely,    [facility]

## 2025-06-27 NOTE — Clinical Note
[facility]  [id number] [facility]  [place] [address]    [id number]    Haley Hawthorne  [address] [facility] [address]    June 27, 2025    Member Name: Haley Hawthorne   Member Number: [id number]   Reference Number: [id number]   Approved Services: Outpatient Surgery   Approved Service Dates: 06/26/2025 - 08/27/2025   Requesting Provider: Soraya Horne   Requested Provider: [facility]     Dear Haley Hawthorne:    The following medical service(s) requested by Soraya Horne have been approved:    Procedure Code Procedure Code Name Requested Quantity Approved Quantity Status   99151 (CPT®) [place] NJX AA&/STRD TFRML EPI LUMBAR/SACRAL 1 LEVEL 1 1 Authorized   71374 (CPT®) [place] MOD SED SAME PHYS/QHP INITIAL 15 MINS 5/> YRS 1 1 Authorized       Approved Quantity means the number of visits approved for medication treatments and/or medical services.    The services should be provided by [facility] no later than 08/27/2025. Please contact the provider listed below with any questions.     Provider Information:  [facility]  [phone]    Your plan benefit may require a deductible, co-payment or coinsurance for these services. This authorization does not guarantee [facility] will pay the claim for services that you receive. Payment by [facility] for these services is subject to the terms of your Evidence of Coverage, your eligibility at the time of service, and confirmation of benefit coverage.    For any questions or additional information, please contact Customer Service:    [facility] Toll Free: [phone]  [employer] users dial: [phone]   Call Center Hours:  Oct 1 - Mar 31, Mon - Fri 7 AM to 8 PM PST  Oct 1 - Mar 31, Sat - Sun 8 AM to 8 PM PST  Apr 1 - Sep 30, Mon - Fri 7 AM to 8 PM PST   Office Hours: Mon - Fri 8 AM to 5 PM PST   E-mail: [email or web address]   Website:   [email or web address]      This information is available for free in other languages. Please contact Customer Service at the phone number above for more information. [facility] complies with applicable Federal civil rights laws and does not discriminate on the basis of race, color, national origin, age, disability or sex.    Sincerely,     Healthcare Utilization Management Department     Cc: [facility]   Soraya WAKEFIELD Coleen    Multi-Language Insert  Multi- Services  English: We have free  services to answer any questions you may have about our health or drug plan.  To get an , just call us at [phone].  Someone who speaks English/Language can help you.  This is a free service.  Czech: Tenemos servicios de intérprete sin costo alguno  para responder cualquier pregunta que pueda tener sobre nuestro plan de maryann o medicamentos. Para hablar con un intérprete, por favor llame al [phone]. Alguien que hable español le podrá ayudar. Radha es un servicio gratuito.  Chinese Mandarin: ?????????????????????????????? ???????????????? [phone]????????????????? ?????????  Chinese Cantonese: ?????????????????????????????? ????????????? [phone]???????????????????? ????????  Tagalog:  Mayroon kaming libreng serbimerle griersaphilip foxg a renetta yun hinggimicheal collier o panggamot.  barbara De [place]  [phone]. Lily Maza.  Tin fowler.  Estonian:  Nous proposons jennifer services gratuits d'interprétation pour répondre à toutes hilda questions relatives à notre régime de santé ou d'assurance-médicaments. Pour accéder au service d'interprétation, il vous suffit de nous appeler au [phone]. Un interlocuteur parlant Françs pourra vous aider. Ce service est gratuit.  Tunisian:  Chad  tôi có d?ch v? thông d?ch mi?n phí ð? tr? l?i các câu h?i v? chýõng s?c kh?e và chýõng trình thu?c men. N?u quí v? c?n thông d?ch viên raven g?i [phone] s? có nhân viên nói ti?ng Vi?t giúp ð? quí v?. Ðây là d?ch v? mi?n phí .  Uzbek:  Unser kosScotland County Memorial Hospitalser Dolmetscherservice beantwortet Ihren Fragen zu unserem Gesundheits- und Arzneimittelplan. Unsere Dolmetscher erreichen Sie [phone]. Man wird Ihnen sathya [facility]. Dieser Service ist [facility].  Chinese:  ??? ?? ?? ?? ?? ??? ?? ??? ?? ???? ?? ?? ???? ???? ????. ?? ???? ????? ?? [phone] ??? ??? ????.  ???? ?? ???? ?? ?? ????. ? ???? ??? ?????.   Tongan: Åñëè ó âàñ âîçíèêíóò âîïðîñû îòíîñèòåëüíî ñòðàõîâîãî èëè ìåäèêàìåíòíîãî ïëàíà, âû ìîæåòå âîñïîëüçîâàòüñÿ íàøèìè áåñïëàòíûìè óñëóãàìè ïåðåâîä÷èêîâ. ×òîáû âîñïîëüçîâàòüñÿ óñëóãàìè ïåðåâîä÷èêà, ïîçâîíèòå íàì ïî òåëåôîíó [phone]. Âàì îêàæåò ïîìîùü ñîòðóäíèê, êîòîðûé ãîâîðèò ïî-póññêè. Äàííàÿ óñëóãà áåñïëàòíàÿ.  Georgian: ÅääÇ äÞÏã ÎÏãÇÊ ÇáãÊÑÌã ÇáÝæÑí ÇáãÌÇäíÉ ááÅÌÇÈÉ Úä Ãí ÃÓÆáÉ ÊÊÚáÞ ÈÇáÕÍÉ Ãæ ÌÏæá ÇáÃÏæíÉ áÏíäÇ. ááÍÕæá Úáì ãÊÑÌã ÝæÑí¡ áíÓ Úáíß Óæì ÇáÇÊÕÇá ÈäÇ Úáì [phone] . ÓíÞæã ÔÎÕ ãÇ íÊÍÏË ÇáÚÑÈíÉ ÈãÓÇÚÏÊß. åÐå ÎÏãÉ ãÌÇäíÉ.  Shelley: ????? ????????? ?? ??? ?? ????? ?? ???? ??? ???? ???? ?? ?????? ?? ???? ???? ?? ??? ????? ??? ????? ???????? ?????? ?????? ???. ?? ???????? ??????? ???? ?? ???, ?? ???? [phone] ?? ??? ????. ??? ??????? ?? ?????? ????? ?? ???? ??? ?? ???? ??. ?? ?? ????? ???? ??.   Sinhala:  È disponibile un servizio di interpretariato gratuito per rispondere a eventuali domande sul nostro piano sanitario e farmaceutico. Per un interprete, contattare il lou [phone]. Un nostro incaricato jacinta parla Italianovi fornirà l'assistenza necessaria. È un servizio gratuito.  Portugués:  Dispomos de serviços de interpretação gratuitos para responder a qualquer questão que tenha acerca do nosso plano de saúde ou de medicação. Para obter um intérprete,  contacte-nos através do número [phone]. Irá encontrar alguém que fale o idioma  Português para o ajudar. Radha serviço é gratuito.  Turkmen Creole:  Nou genyen sèvis entèprèt gratis nabeel reponn tout kesyon ou ta genyen konsènan plan medikal oswa dwòg nou an.  Nabeel jwenn yon entèprèt, jis rele nou nan [phone]. Yon moun ki pale Kreyòl kapab concepción w.  Sa a se yon sèvis ki gratis.  Polish:  Umo¿liwiamy bezp³atne skorzystanie z us³ug t³umacza ustnego, który pomo¿e w uzyskaniu odpowiedzi na temat planu zdrowotnego lub dawkowania phillw. Ashley skorzystaæ z pomocy t³umacza znaj¹cego nickolas ch¿y zadzwoniæ pod numer [phone]. Ta us³uga jest bezp³atna.  Kenyan: ????? ??????? ????????????????????? ??????????????????????????????????[phone] ???????????????? ? ????????????????? ?????

## 2025-07-03 ENCOUNTER — APPOINTMENT (OUTPATIENT)
Dept: RADIOLOGY | Facility: REHABILITATION | Age: 70
End: 2025-07-03
Attending: STUDENT IN AN ORGANIZED HEALTH CARE EDUCATION/TRAINING PROGRAM
Payer: MEDICARE

## 2025-07-03 ENCOUNTER — HOSPITAL ENCOUNTER (OUTPATIENT)
Facility: REHABILITATION | Age: 70
End: 2025-07-03
Attending: STUDENT IN AN ORGANIZED HEALTH CARE EDUCATION/TRAINING PROGRAM | Admitting: STUDENT IN AN ORGANIZED HEALTH CARE EDUCATION/TRAINING PROGRAM
Payer: MEDICARE

## 2025-07-03 VITALS
OXYGEN SATURATION: 94 % | SYSTOLIC BLOOD PRESSURE: 119 MMHG | RESPIRATION RATE: 16 BRPM | BODY MASS INDEX: 22.51 KG/M2 | WEIGHT: 114.64 LBS | HEART RATE: 50 BPM | TEMPERATURE: 98.2 F | HEIGHT: 60 IN | DIASTOLIC BLOOD PRESSURE: 63 MMHG

## 2025-07-03 PROCEDURE — 700117 HCHG RX CONTRAST REV CODE 255

## 2025-07-03 PROCEDURE — 99152 MOD SED SAME PHYS/QHP 5/>YRS: CPT

## 2025-07-03 PROCEDURE — 64483 NJX AA&/STRD TFRM EPI L/S 1: CPT

## 2025-07-03 PROCEDURE — 700111 HCHG RX REV CODE 636 W/ 250 OVERRIDE (IP): Mod: JZ

## 2025-07-03 RX ORDER — DEXAMETHASONE SODIUM PHOSPHATE 10 MG/ML
INJECTION, SOLUTION INTRAMUSCULAR; INTRAVENOUS
Status: COMPLETED
Start: 2025-07-03 | End: 2025-07-03

## 2025-07-03 RX ORDER — MIDAZOLAM HYDROCHLORIDE 1 MG/ML
INJECTION INTRAMUSCULAR; INTRAVENOUS
Status: COMPLETED
Start: 2025-07-03 | End: 2025-07-03

## 2025-07-03 RX ORDER — LIDOCAINE HYDROCHLORIDE 10 MG/ML
INJECTION, SOLUTION EPIDURAL; INFILTRATION; INTRACAUDAL; PERINEURAL
Status: COMPLETED
Start: 2025-07-03 | End: 2025-07-03

## 2025-07-03 RX ADMIN — IOHEXOL 5 ML: 240 INJECTION, SOLUTION INTRATHECAL; INTRAVASCULAR; INTRAVENOUS; ORAL at 08:43

## 2025-07-03 RX ADMIN — FENTANYL CITRATE 50 MCG: 50 INJECTION, SOLUTION INTRAMUSCULAR; INTRAVENOUS at 08:38

## 2025-07-03 RX ADMIN — DEXAMETHASONE SODIUM PHOSPHATE 10 MG: 10 INJECTION, SOLUTION INTRAMUSCULAR; INTRAVENOUS at 08:43

## 2025-07-03 RX ADMIN — LIDOCAINE HYDROCHLORIDE 10 ML: 10 INJECTION, SOLUTION EPIDURAL; INFILTRATION; INTRACAUDAL; PERINEURAL at 08:43

## 2025-07-03 RX ADMIN — MIDAZOLAM HYDROCHLORIDE 1 MG: 1 INJECTION, SOLUTION INTRAMUSCULAR; INTRAVENOUS at 08:38

## 2025-07-03 ASSESSMENT — FIBROSIS 4 INDEX: FIB4 SCORE: 2.79

## 2025-07-03 NOTE — INTERVAL H&P NOTE
Consented Procedure: RIGHT L5-S1 transforaminal epidural steroid injection with fluoroscopic guidance  WITH SEDATION  I have examined the patient, provided the risks, benefits, and alternatives to the procedure(s) indicated on the signed consent form, and the patient wishes to proceed.    H&P reviewed. The patient was examined and there are no changes to the H&P      Soraya Horne M.D.  07/03/25 8:04 AM

## 2025-07-03 NOTE — OP REPORT
Date of Service: 7/3/2025     Patient: Haley Hawthorne 70 y.o. female     MRN: [id number]     Physician/s: Soraya Horne MD    Pre-operative Diagnosis: Lumbar radiculopathy    Post-operative Diagnosis: Lumbar radiculopathy    Procedure: Lumbar Transforaminal Epidural Steroid Injection at the  right  L5-S1 levels.     Description of procedure:    The risks, benefits, and alternatives of the procedure were reviewed and discussed with the patient.  Written informed consent was freely obtained. A pre-procedural time-out was conducted by the physician verifying patient’s identity, procedure to be performed, procedure site and side, and allergy verification. Appropriate equipment was determined to be in place for the procedure.     Moderation sedation was achieved with Versed (1mg) and Fentanyl (50mcg). Monitoring of the patients vital signs and respiratory status was provided by trained independent registered nurse during the entire course of the procedures and under my supervision and recoded in the patient’s medical record. The duration of sedation was over 10 minutes.     The patient's vital signs were carefully monitored before, throughout, and after the procedure.     In the fluoroscopy suite the patient was placed in a prone position, a pillow placed underneath their umbilicus. The skin was prepped and draped in the usual sterile fashion.     The fluoroscope was placed over the lumbar spine and adjusted into the proper AP/Oblique view to enter the transforaminal space just adjacent to the pedicle at the levels below. The targets for injection were then marked at the right L5-S1. A 27g 1.5 inch needle was placed into the marked site, and approximately 1mL of 1% Lidocaine was injected subcutaneously into the epidermal and dermal layers. The needle was removed intact.  A 25g 3.5 inch spinal needle was then placed and advanced under fluoroscopic guidance in an oblique view towards the epidural space of the  levels noted above. The needle position was confirmed to not be past the 6 o'clock position in the AP view and it was in the neuroforamen in the lateral view.     Under live fluoroscopic guidance in the AP view, contrast dye was used to highlight the epidural space spread of each level above. Final fluoroscopic images were saved.  Following negative aspiration, 1mL of 1% lidocaine preservative free with 1mL of 10mg/mL of dexamethasone was then injected at each level above, and the needles were removed intact after being restyleted. The patient's back was covered with a 4x4 gauze, the area was cleansed with sterile normal saline, and a dressing was applied. There were no complications noted.     The patient was then evaluated post-procedure, and was hemodynamically stable prior to leaving the post-operative care unit.     Follow-up as scheduled    Soraya Horne MD  Interventional Pain and Spine  Physical Medicine and Rehabilitation  [facility]    Pain score prior to injection: 6/10 on NRS  Pain score immediately after injection: 5/10 on NRS    CPT codes  Transforaminal epidural injection- lumbar or sacral (first level):  55276  moderate procedural sedation first 15 minutes: 81156

## 2025-07-07 PROCEDURE — RXMED WILLOW AMBULATORY MEDICATION CHARGE: Performed by: FAMILY MEDICINE

## 2025-07-17 ENCOUNTER — PHARMACY VISIT (OUTPATIENT)
Dept: PHARMACY | Facility: MEDICAL CENTER | Age: 70
End: 2025-07-17
Payer: COMMERCIAL

## 2025-07-18 PROCEDURE — RXMED WILLOW AMBULATORY MEDICATION CHARGE: Performed by: STUDENT IN AN ORGANIZED HEALTH CARE EDUCATION/TRAINING PROGRAM

## 2025-07-21 ENCOUNTER — PHARMACY VISIT (OUTPATIENT)
Dept: PHARMACY | Facility: MEDICAL CENTER | Age: 70
End: 2025-07-21
Payer: COMMERCIAL

## 2025-07-25 ENCOUNTER — OFFICE VISIT (OUTPATIENT)
Dept: PHYSICAL MEDICINE AND REHAB | Facility: MEDICAL CENTER | Age: 70
End: 2025-07-25
Payer: MEDICARE

## 2025-07-25 VITALS
OXYGEN SATURATION: 98 % | TEMPERATURE: 98.3 F | HEIGHT: 60 IN | BODY MASS INDEX: 22.68 KG/M2 | HEART RATE: 61 BPM | WEIGHT: 115.52 LBS | SYSTOLIC BLOOD PRESSURE: 123 MMHG | DIASTOLIC BLOOD PRESSURE: 64 MMHG

## 2025-07-25 DIAGNOSIS — M54.51 VERTEBROGENIC LOW BACK PAIN: Primary | ICD-10-CM

## 2025-07-25 DIAGNOSIS — M54.50 CHRONIC BILATERAL LOW BACK PAIN, UNSPECIFIED WHETHER SCIATICA PRESENT: ICD-10-CM

## 2025-07-25 DIAGNOSIS — M48.061 NEUROFORAMINAL STENOSIS OF LUMBAR SPINE: ICD-10-CM

## 2025-07-25 DIAGNOSIS — G89.29 CHRONIC BILATERAL LOW BACK PAIN, UNSPECIFIED WHETHER SCIATICA PRESENT: ICD-10-CM

## 2025-07-25 DIAGNOSIS — M54.16 RIGHT LUMBAR RADICULITIS: ICD-10-CM

## 2025-07-25 DIAGNOSIS — S33.6XXA STRAIN OF SACROILIAC LIGAMENT, INITIAL ENCOUNTER: ICD-10-CM

## 2025-07-25 DIAGNOSIS — Z91.81 RISK FOR FALLS: ICD-10-CM

## 2025-07-25 DIAGNOSIS — M47.816 LUMBAR SPONDYLOSIS: ICD-10-CM

## 2025-07-25 DIAGNOSIS — M53.3 SACROILIAC JOINT DYSFUNCTION OF RIGHT SIDE: ICD-10-CM

## 2025-07-25 ASSESSMENT — PATIENT HEALTH QUESTIONNAIRE - PHQ9: CLINICAL INTERPRETATION OF PHQ2 SCORE: 0

## 2025-07-25 ASSESSMENT — PAIN SCALES - GENERAL: PAINLEVEL_OUTOF10: 4=SLIGHT-MODERATE PAIN

## 2025-07-25 ASSESSMENT — FIBROSIS 4 INDEX: FIB4 SCORE: 2.79

## 2025-07-25 NOTE — PROGRESS NOTES
Verbal consent was acquired by the patient to use [employer] ambient listening note generation during this visit Yes     Follow-up patient Note    Interventional Pain and Spine  Physiatry (Physical Medicine and Rehabilitation)     Patient Name: Haley Hawthorne  : 1955  Date of service: 2025    Chief Complaint:   Chief Complaint   Patient presents with    Follow-Up     Back pain       HISTORY  Please see new patient note by Dr. Horne for more details.     HPI  Today's visit   Haley Hawthorne ( 1955) is a female with The primary encounter diagnosis was Vertebrogenic low back pain. Diagnoses of Risk for falls, Right lumbar radiculitis, Sacroiliac joint dysfunction of right side, Lumbar spondylosis, Chronic bilateral low back pain, unspecified whether sciatica present, Neuroforaminal stenosis of lumbar spine, and Strain of sacroiliac ligament, initial encounter were also pertinent to this visit.    History of Present Illness  The patient is a 70-year-old female who presents for evaluation of back pain.    Back Pain  - Pain exacerbated by a fall a week ago when she landed on her right side.  Fell when she tried to step over a fence.  - Pain has been progressively worsening each morning.  - No obvious relief from injections administered in March or previously  - New symptom: pain upon waking.  - No leg tingling since epidural.  - Axial low back pain intensifies when bending forward or lying flat, especially during sleep.  This has been the case since before her fall.  - Difficulty standing for extended periods.    Apprehensive about surgery due to son's recent bulging disc surgery.  Not engaged in physical therapy, taking usual medications.      Pain severity 4/10 currently  Pt denies new numbness, tingling, or weakness.    Procedure history:  - 3/6/25 right Sacroiliac joint injection with sedation-resolution of index pain  - 7/3/2025 Lumbar Transforaminal Epidural Steroid Injection  at the  right  L5-S1 levels.  Relief of tingling in leg    ROS:   Red Flags ROS:   Fever, Chills, Sweats: Denies  Involuntary Weight Loss: Denies  Bladder Incontinence: Denies  Bowel Incontinence: denies  Saddle Anesthesia: Denies    All other systems reviewed and negative.     PMHx:   Past Medical History:   Diagnosis Date    Acute postoperative abdominal pain 08/08/2019    Allergy     Appendicitis, acute 08/08/2019    Arrhythmia     sinus bradycardia    Arthritis     hands/ hip-rheumatoid    Asthma     Back pain     Rosado's esophagus     Blood transfusion without reported diagnosis     BRCA1 negative     BRCA2 negative     Breast cancer (HCC) 03/03/2015    Bronchitis     years ago    CAD (coronary artery disease)     Cancer ([facility]) 2015    dcis/ right breast, radiation    Cataract     Chickenpox     Depression     Foot drop, right 04/12/2022    GERD (gastroesophageal reflux disease)     Heart burn     Hiatus hernia syndrome 2019    colonoscopy    High cholesterol     Hypertension     Indigestion     Left hip pain 06/22/2022    Nasal drainage     Osteoporosis     Other chest pain 06/26/2019    Pain 06/2018    hands    Painful respiration 01/10/2022    Pneumonia 2016    Snoring     Tonsillitis        PSHx:   Past Surgical History:   Procedure Laterality Date    UT NJX AA&/STRD TFRML EPI LUMBAR/SACRAL 1 LEVEL Right 7/3/2025    Procedure: RIGHT L5-S1 transforaminal epidural steroid injection with fluoroscopic guidance  WITH SEDATION;  Surgeon: Soraya Horne M.D.;  Location[facility];  Service: Pain Management    UT INJECTION,SACROILIAC JOINT Right 03/06/2025    Procedure: RIGHT sacroiliac joint injection with fluoroscopic guidance;  Surgeon: Soraya Horne M.D.;  Location: [facility];  Service: Pain Management    UT REPAIR OF NASAL SEPTUM N/A 05/28/2020    Procedure: SEPTOPLASTY, NOSE;  Surgeon: Arnulfo Salgado M.D.;  Location: SURGERY SAME DAY [facility];  Service: Ent     TURBINOPLASTY Bilateral 05/28/2020    Procedure: TURBINOPLASTY- SUBMUCOSAL APPROACH;  Surgeon: Arnulfo Salgado M.D.;  Location: SURGERY SAME DAY [facility];  Service: Ent    WA LAP,APPENDECTOMY  08/08/2019    Procedure: APPENDECTOMY, LAPAROSCOPIC;  Surgeon: John Cunningham M.D.;  Location: [facility];  Service: General    GANGLION EXCISION Left 09/25/2018    Procedure: GANGLION EXCISION-  CYST;  Surgeon: Hieu Salamanca M.D.;  Location: SURGERY [facility];  Service: Orthopedics    FINGER ARTHROPLASTY Left 09/25/2018    Procedure: FINGER ARTHROPLASTY- CARPOMETACARPAL;  Surgeon: Hieu Salamanca M.D.;  Location: [facility];  Service: Orthopedics    REPAIR, TENDON, LOWER EXTREMITY, USING TENDON GRAFT Left 09/25/2018    Procedure: FLEXOR TENDON REPAIR-  CARPI RADIALIS;  Surgeon: Hieu Salamanca M.D.;  Location: SURGERY [facility];  Service: Orthopedics    FINGER ARTHROPLASTY Right 06/05/2018    Procedure: FINGER ARTHROPLASTY - CARPOMETACARPAL;  Surgeon: Hieu Salamanca M.D.;  Location: [facility];  Service: Orthopedics    TENDON TRANSFER  06/05/2018    Procedure: TENDON TRANSFER - FLEXOR CARPI RADIALIS;  Surgeon: Hieu Salamanca M.D.;  Location: [facility];  Service: Orthopedics    PARTIAL MASTECTOMY  04/21/2015    Performed by Anna Licona M.D. at SURGERY SAME DAY [facility]    LUMPECTOMY  04/21/2015    Performed by Anna Licona M.D. at SURGERY SAME DAY [facility]    CARPAL TUNNEL RELEASE  2013    HYSTERECTOMY LAPAROSCOPY  2006    ABDOMINAL EXPLORATION      BRCA1&2 GEN FULL SEQ DUP/DEL      BREAST BIOPSY      left benign biopsy    OTHER ABDOMINAL SURGERY  2019    Appnedicitus    WA RADIATION THERAPY PLAN SIMPLE  2015    TONSILLECTOMY         Family Hx:   Family History   Problem Relation Age of Onset    Stroke Mother     Heart Disease Mother     Hypertension Mother     Cancer Sister         Breast cancer    Heart Disease Brother      Cancer Brother         Lung cancer    Lung Disease Brother     Heart Disease Brother     Sleep Apnea Son     Cancer Maternal Grandmother         Cervical cancer    Cancer Paternal Grandmother         Lung cancer    Heart Disease Brother     Heart Disease Brother     Stroke Brother     Stroke Brother        Social Hx:  Social History     Socioeconomic History    Marital status:      Spouse name: Not on file    Number of children: 3    Years of education: Not on file    Highest education level: GED or equivalent   Occupational History    Occupation: Retired     Employer: Retired   Tobacco Use    Smoking status: Former     Current packs/day: 0.00     Average packs/day: 1 pack/day for 10.0 years (10.0 ttl pk-yrs)     Types: Cigarettes     Start date: 1969     Quit date: 1979     Years since quittin.5    Smokeless tobacco: Never   Vaping Use    Vaping status: Never Used   Substance and Sexual Activity    Alcohol use: Yes     Comment: Wine 2 xs a week - quit all together    Drug use: No    Sexual activity: Not Currently     Partners: Male   Other Topics Concern    Not on file   Social History Narrative    Not on file     Social Drivers of Health     Financial Resource Strain: Low Risk  (2024)    Overall Financial Resource Strain (CARDIA)     Difficulty of Paying Living Expenses: Not very hard   Food Insecurity: No Food Insecurity (2024)    Hunger Vital Sign     Worried About Running Out of Food in the Last Year: Never true     Ran Out of Food in the Last Year: Never true   Transportation Needs: No Transportation Needs (2024)    PRAPARE - Transportation     Lack of Transportation (Medical): No     Lack of Transportation (Non-Medical): No   Physical Activity: Insufficiently Active (2024)    Exercise Vital Sign     Days of Exercise per Week: 5 days     Minutes of Exercise per Session: 20 min   Stress: Stress Concern Present (2024)    [employer] -  Occupational Stress Questionnaire     Feeling of Stress : To some extent   Social Connections: Moderately Isolated (12/17/2024)    Social Connection and Isolation Panel [NHANES]     Frequency of Communication with Friends and Family: Once a week     Frequency of Social Gatherings with Friends and Family: Once a week     Attends [employer] Services: More than 4 times per year     Active Member of Clubs or Organizations: No     Attends Club or Organization Meetings: Never     Marital Status:    Intimate Partner Violence: Not on file   Housing Stability: Low Risk  (12/17/2024)    Housing Stability Vital Sign     Unable to Pay for Housing in the Last Year: No     Number of Times Moved in the Last Year: 0     Homeless in the Last Year: No       Allergies:  Allergies   Allergen Reactions    Sulfa Drugs Hives       Medications: reviewed on epic.   Outpatient Medications Marked as Taking for the 7/25/25 encounter (Office Visit) with Soraya Horne M.D.   Medication Sig Dispense Refill    ipratropium (ATROVENT) 0.06 % Solution Administer 2 Sprays into affected nostril(S) 4 times a day. 15 mL 2    verapamil ER (CALAN SR) 240 MG Tab CR Take 1 Tablet by mouth every day. 90 Tablet 1    estradiol (ESTRACE) 0.1 MG/GM vaginal cream Insert 1 g into the vagina every day. After 2 weeks insert every other day. 42.5 g 1    alendronate (FOSAMAX) 70 MG Tab Take 1 Tablet by mouth every 7 days. 12 Tablet 3    fluticasone (FLOVENT HFA) 44 MCG/ACT Aerosol Inhale 2 Puffs by mouth 2 times a day as needed (Shortness of breath). 10.6 g 0    PARoxetine (PAXIL) 20 MG Tab Take 1 Tablet by mouth every day. 90 Tablet 2    omeprazole (PRILOSEC) 20 MG delayed-release capsule Take 1 Capsule by mouth every morning. 100 Capsule 0    aspirin 81 MG EC tablet Take 81 mg by mouth every day.      atorvastatin (LIPITOR) 40 MG Tab Take 1 Tablet by mouth every day. (Patient taking differently: Take 40 mg by mouth every morning.) 90 Tablet 3        Current  Outpatient Medications on File Prior to Visit   Medication Sig Dispense Refill    ipratropium (ATROVENT) 0.06 % Solution Administer 2 Sprays into affected nostril(S) 4 times a day. 15 mL 2    verapamil ER (CALAN SR) 240 MG Tab CR Take 1 Tablet by mouth every day. 90 Tablet 1    estradiol (ESTRACE) 0.1 MG/GM vaginal cream Insert 1 g into the vagina every day. After 2 weeks insert every other day. 42.5 g 1    alendronate (FOSAMAX) 70 MG Tab Take 1 Tablet by mouth every 7 days. 12 Tablet 3    fluticasone (FLOVENT HFA) 44 MCG/ACT Aerosol Inhale 2 Puffs by mouth 2 times a day as needed (Shortness of breath). 10.6 g 0    PARoxetine (PAXIL) 20 MG Tab Take 1 Tablet by mouth every day. 90 Tablet 2    omeprazole (PRILOSEC) 20 MG delayed-release capsule Take 1 Capsule by mouth every morning. 100 Capsule 0    aspirin 81 MG EC tablet Take 81 mg by mouth every day.      atorvastatin (LIPITOR) 40 MG Tab Take 1 Tablet by mouth every day. (Patient taking differently: Take 40 mg by mouth every morning.) 90 Tablet 3    atorvastatin (LIPITOR) 40 MG Tab Take 1 tablet by mouth Once a day *needs appt for refill 100 Tablet 0    methocarbamol (ROBAXIN) 500 MG Tab Take 1 Tablet by mouth 3 times a day as needed (back pain). (Patient not taking: Reported on 7/25/2025) 30 Tablet 1    verapamil ER (CALAN-SR) 240 MG Tab CR TAKE 1 TABLET BY MOUTH EVERY DAY (Patient taking differently: Take 240 mg by mouth every morning.) 90 Tablet 3    losartan (COZAAR) 25 MG Tab Take 1 Tablet by mouth every day. (Patient taking differently: Take 25 mg by mouth every morning. Taking 12.5 in the morning) 90 Tablet 3     No current facility-administered medications on file prior to visit.         EXAMINATION     Physical Exam:   /64   Pulse 61   Temp 36.8 °C (98.3 °F) (Temporal)   Ht 1.524 m (5')   Wt 52.4 kg (115 lb 8.3 oz)   SpO2 98%     Constitutional:   Body Habitus: Body mass index is 22.56 kg/m².  Cooperation: Fully cooperates with exam  Appearance:  Well-groomed, well-nourished.    Eyes: No scleral icterus to suggest severe liver disease, no proptosis to suggest severe hyperthyroidism    ENT -no obvious auditory deficits, no noticeable facial droop     Skin -no rashes or lesions noted     Respiratory-  breathing comfortably on room air, no audible wheezing    Cardiovascular-distal extremities warm and well perfused.  No lower extremity edema is noted.     Gastrointestinal - no obvious abdominal masses, non-distended    Psychiatric- alert and oriented ×3. Normal affect.     Gait stable, steady    Thoracic/Lumbar Spine/Sacral Spine/Hips   Inspection: No evidence of atrophy in bilateral lower extremities throughout     Facet loading maneuver negative bilaterally    Palpation:   Tenderness to palpation over the right and left axial low back and glute.     Lumbar spine /hip provocative exam maneuvers  Straight leg raise negative bilaterally  FADIR test negative bilaterally    SI joint tests  ROSEMARY test negative bilaterally    Key points for the international standards for neurological classification of spinal cord injury (ISNCSCI) to light touch.   Dermatome R L   L2 2 2   L3 2 2   L4 2 2   L5 2 2   S1 2 2   S2 2 2       Motor Exam Lower Extremities  ? Myotome R L   Hip flexion L2 5 5   Knee extension L3 5 5   Ankle dorsiflexion L4 5 5   Toe extension L5 5 5   Ankle plantarflexion S1 5 5         MEDICAL DECISION MAKING    Medical records review: see under HPI section.     DATA    Labs: Personally reviewed at today's visit:     Lab Results   Component Value Date/Time    SODIUM 141 04/25/2025 11:40 AM    POTASSIUM 4.4 04/25/2025 11:40 AM    CHLORIDE 107 04/25/2025 11:40 AM    CO2 25 04/25/2025 11:40 AM    ANION 9.0 04/25/2025 11:40 AM    GLUCOSE 93 04/25/2025 11:40 AM    BUN 18 04/25/2025 11:40 AM    CREATININE 0.88 04/25/2025 11:40 AM    CALCIUM 9.9 04/25/2025 11:40 AM    ASTSGOT 29 04/25/2025 11:40 AM    ALTSGPT 21 04/25/2025 11:40 AM    TBILIRUBIN 0.5 04/25/2025  11:40 AM    ALBUMIN 4.3 04/25/2025 11:40 AM    TOTPROTEIN 6.7 04/25/2025 11:40 AM    GLOBULIN 2.4 04/25/2025 11:40 AM    AGRATIO 1.8 04/25/2025 11:40 AM       Lab Results   Component Value Date/Time    PROTHROMBTM 13.8 06/13/2023 01:46 PM    INR 1.07 06/13/2023 01:46 PM        Lab Results   Component Value Date/Time    WBC 2.8 (L) 04/25/2025 11:40 AM    RBC 4.27 04/25/2025 11:40 AM    HEMOGLOBIN 12.8 04/25/2025 11:40 AM    HEMATOCRIT 39.7 04/25/2025 11:40 AM    MCV 93.0 04/25/2025 11:40 AM    MCH 30.0 04/25/2025 11:40 AM    MCHC 32.2 04/25/2025 11:40 AM    MPV 10.8 04/25/2025 11:40 AM    NEUTSPOLYS 56.70 04/25/2025 11:40 AM    LYMPHOCYTES 29.50 04/25/2025 11:40 AM    MONOCYTES 10.30 04/25/2025 11:40 AM    EOSINOPHILS 2.80 04/25/2025 11:40 AM    BASOPHILS 0.70 04/25/2025 11:40 AM        Lab Results   Component Value Date/Time    HBA1C 5.5 04/19/2024 10:02 AM        Imaging:   I personally reviewed following images, these are my reads  MRI lumbar spine 2/25/2025  Type II Modic changes at L4, L5 and S1 vertebrae.  L2-3 there is mild canal narrowing.  At L3-4 there is mild bilateral neuroforaminal stenosis.  At L5-S1 there is moderate left neuroforaminal stenosis.  Facet arthropathy at bilateral lower lumbar levels. See formal radiology report for further details.      X-ray hip 2/7/2025  Mild OA of bilateral hips    IMAGING radiology reads. I reviewed the following radiology reads       Results for orders placed during the hospital encounter of 02/03/23    MR-BRAIN-W/O    Impression  1.  There is no acute infarct.  2.  Mild chronic microvascular ischemic disease.  3.  Mild cerebral volume loss.             Results for orders placed during the hospital encounter of 11/20/07    MR-CERVICAL SPINE-W/O    Impression  IMPRESSION:    1. DEGENERATIVE DISC AND ARTICULAR PILLAR ARTHROPATHY, PARTICULARLY AT  C5-6 WHERE THERE IS MILD CANAL STENOSIS, MODERATE LEFT-SIDED FORAMINAL  STENOSIS, AND MILD-TO-MODERATE RIGHT-SIDED  FORAMINAL STENOSIS.    2. MILD LATERAL VERTEBRAL SPURRING C3-4 WITH VERY MILD NEURAL FORAMINAL  COMPROMISE.    GEK/josh      Read By ASIA PERRIN MD on Nov 21 2007  8:27AM  : JOSH Transcription Date: Nov 21 2007 12:53PM  THIS DOCUMENT HAS BEEN ELECTRONICALLY SIGNED BY: ASIA PERRIN MD on  Nov 21 2007  4:36PM      Results for orders placed during the hospital encounter of 02/25/25    MR-LUMBAR SPINE-W/O    Impression  Moderate left foraminal narrowing at L5-S1, stable.    There is no significant spinal canal stenosis.        Results for orders placed during the hospital encounter of 02/25/25    MR-LUMBAR SPINE-W/O    Impression  Moderate left foraminal narrowing at L5-S1, stable.    There is no significant spinal canal stenosis.                                       Results for orders placed during the hospital encounter of 11/06/07    DX-CERVICAL SPINE-2 OR 3 VIEWS    Impression  IMPRESSION:    1. MODERATE DEGENERATIVE DISC DISEASE IS PRESENT AT C5-6.    2. MINIMAL SPONDYLOLISTHESIS AT C7-T1.    3. NO ACUTE FRACTURE OR DISLOCATION IS IDENTIFIED ON THREE VIEWS OF THE  CERVICAL SPINE.    TRB/wds      Read By MEGAN ROBERTSON MD on Nov 6 2007  1:33PM  : JEREMY Transcription Date: Nov 6 2007  6:44PM  THIS DOCUMENT HAS BEEN ELECTRONICALLY SIGNED BY: MEGAN ROBERTSON MD on  Nov 7 2007  8:25AM     Results for orders placed during the hospital encounter of 01/20/21    DX-CHEST-2 VIEWS    Impression  No acute cardiopulmonary abnormality.            Results for orders placed during the hospital encounter of 05/19/04    DX-HAND 3+    Impression  IMPRESSION:      1.   NO ACUTE ABNORMALITIES OF THE RIGHT HAND.              READING DR:        MEGAN ROBERTSON MD  READING DATE:      May 19 2004  1:28PM  REPORT STATUS:     FINAL REPORT    TRANSCRIPTION CODE:         LVD  TRANSCRIPTION DATE:         May 20 2004  4:22AM    THIS DOCUMENT HAS BEEN ELECTRONICALLY SIGNED BY:  MEGAN ROBERTSON MD  -  May 20 2004  8:28AM            Results for orders placed during the hospital encounter of 23    DX-LUMBAR SPINE-2 OR 3 VIEWS    Impression  Mild worsening moderate spondylosis   Results for orders placed during the hospital encounter of 10/31/23    DX-LUMBAR SPINE-4+ VIEWS    Impression  1.  Mild scoliosis.  2.  Moderate multilevel degenerative change of lumbar spine.  3.  No fracture or subluxation.  4.  No evidence of instability.                        Diagnosis  Visit Diagnoses     ICD-10-CM   1. Vertebrogenic low back pain  M54.51   2. Risk for falls  Z91.81   3. Right lumbar radiculitis  M54.16   4. Sacroiliac joint dysfunction of right side  M53.3   5. Lumbar spondylosis  M47.816   6. Chronic bilateral low back pain, unspecified whether sciatica present  M54.50    G89.29   7. Neuroforaminal stenosis of lumbar spine  M48.061   8. Strain of sacroiliac ligament, initial encounter  S33.6XXA           ASSESSMENT AND PLAN:  Haley FOFANA Kathryn Hawthorne (: 1955) is a female with      Haley was seen today for follow-up.    Diagnoses and all orders for this visit:    Vertebrogenic low back pain  -     Pain Hospital Procedure; Future    Risk for falls  -     Patient identified as fall risk.  Appropriate orders and counseling given.    Right lumbar radiculitis    Sacroiliac joint dysfunction of right side    Lumbar spondylosis    Chronic bilateral low back pain, unspecified whether sciatica present    Neuroforaminal stenosis of lumbar spine    Strain of sacroiliac ligament, initial encounter              PLAN  Physical Therapy: The patient has done at least 6 weeks of a provider driven physical therapy program for this problem in the past.  Advised patient to continue exercise program as able.  Discussed referral to physical therapy which the patient declined today     Diagnostic workup: Personally reviewed at today's visit:   Fluoroscopic images from 7/3/2025 indicating successful needle placement for  lumbar Transforaminal Epidural Steroid Injection at the  right  L5-S1 levels.        Medications:   -Discussed consideration of gabapentin, she would like to defer this at this time  -Okay to continue Robaxin as per PCP  - Status post discontinuation of Mobic  due to GI upset.   I reviewed her latest GFR which was 70 on 4/25/2025.  Minimize NSAIDs as much as possible to minimize the risk  of worsening renal function     Interventions:   -Discussed procedure for low back pain.  Pain is worse with forward flexion suggestive of vertebrogenic low back pain.  On MRI lumbar spine 2/25/2025 she has Type II Modic changes at L4, L5 and S1 vertebrae. Discussed that at least 80% of patients can have pain relief with the Intracept procedure, and it can take up to 1 year to notice the desired pain relief with this procedure.  Typically postprocedural discomfort can last for 2 weeks.  Most patients notice relief within 2 weeks - 3 months.  Gave her an informational handout today.  -S/p right L5-S1 transforaminal epidural steroid injection under fluoroscopic guidance with relief of leg tingling  -Right sacroiliac joint injection under fluoroscopic guidance as needed  -Discussed diagnostic lumbar medial branch blocks targeting Right L4-L5 and L5-S1 facet joints, defer for now in favor of right sacroiliac joint injection first    Follow-up: Discussed that her pain may be general soft tissue/muscle pain from her fall.  She has no neurologic deficits on exam today.  With her pattern of axial low back pain worse with forward flexion, suspect vertebrogenic low back pain.  Plan for follow-up 2 weeks after Intracept procedure.    Orders Placed This [facility] Procedure    Patient identified as fall risk.  Appropriate orders and counseling given.       Soraya Horne MD  Interventional Pain and Spine  Physical Medicine and Rehabilitation  [facility]      The above note documents my personal evaluation of this  patient. In addition, I have reviewed and confirmed with the patient and MA the supportive information documented in today's Patient Health Questionnaire and Office Note.     Please note that this dictation was created using voice recognition software. I have made every reasonable attempt to correct obvious errors, but I expect that there are errors of grammar and possibly content that I did not discover before finalizing the note.

## 2025-07-29 ENCOUNTER — HOSPITAL ENCOUNTER (OUTPATIENT)
Facility: MEDICAL CENTER | Age: 70
End: 2025-07-29
Attending: NURSE PRACTITIONER
Payer: MEDICARE

## 2025-07-29 ENCOUNTER — OFFICE VISIT (OUTPATIENT)
Dept: MEDICAL GROUP | Facility: PHYSICIAN GROUP | Age: 70
End: 2025-07-29
Payer: MEDICARE

## 2025-07-29 VITALS
HEIGHT: 62 IN | SYSTOLIC BLOOD PRESSURE: 120 MMHG | HEART RATE: 58 BPM | BODY MASS INDEX: 21.29 KG/M2 | DIASTOLIC BLOOD PRESSURE: 58 MMHG | OXYGEN SATURATION: 98 % | TEMPERATURE: 97.5 F | WEIGHT: 115.7 LBS

## 2025-07-29 DIAGNOSIS — R82.90 ABNORMAL URINALYSIS: ICD-10-CM

## 2025-07-29 DIAGNOSIS — R10.9 FLANK PAIN: ICD-10-CM

## 2025-07-29 LAB
APPEARANCE UR: CLEAR
BILIRUB UR STRIP-MCNC: NEGATIVE MG/DL
COLOR UR AUTO: YELLOW
GLUCOSE UR STRIP.AUTO-MCNC: NEGATIVE MG/DL
KETONES UR STRIP.AUTO-MCNC: NEGATIVE MG/DL
LEUKOCYTE ESTERASE UR QL STRIP.AUTO: NORMAL
NITRITE UR QL STRIP.AUTO: NEGATIVE
PH UR STRIP.AUTO: 5.5 [PH] (ref 5–8)
PROT UR QL STRIP: NEGATIVE MG/DL
RBC UR QL AUTO: NEGATIVE
SP GR UR STRIP.AUTO: 1.02
UROBILINOGEN UR STRIP-MCNC: NORMAL MG/DL

## 2025-07-29 ASSESSMENT — ENCOUNTER SYMPTOMS
FEVER: 1
BACK PAIN: 1
FLANK PAIN: 1
FATIGUE: 1
CHILLS: 1

## 2025-07-29 ASSESSMENT — FIBROSIS 4 INDEX: FIB4 SCORE: 2.79

## 2025-07-29 NOTE — PROGRESS NOTES
"Verbal consent was acquired by the patient to use [employer] ambient listening note generation during this visit Yes      Subjective   Haley Hawthorne is a 70 y.o. female who presents for:  History of Present Illness  The patient presents for evaluation of suspected kidney infection.    She suspects a kidney infection, having experienced one in the past without realizing it. She recently felt a sharp, transient pain on both sides of her lower back, which she initially attributed to her existing back problem. She has noticed a change in the color of her urine, suggesting possible blood presence, for about a month. She also reports feeling more fatigued than usual for the past two months, often sleeping early and waking up late. She has been experiencing fevers and chills, along with extreme exhaustion. Her first kidney infection was diagnosed by Dr. Sanchez approximately three years ago, but no imaging was performed at that time. She has a history of low red blood cell levels. During her first childbirth, she lost a significant amount of blood and was informed by her doctor that she could be prone to kidney infections throughout her life.    She was seen here in 05/2025 for chronic sinusitis. Her nose is still the same, but she is not worried about it. One side has actually cleared up pretty good, the other side has the same problems.    Social History:  Sleep: She reports going to bed early and waking up late.    Review of Systems   Constitutional:  Positive for chills, fatigue and fever.   Genitourinary:  Positive for flank pain and hematuria.   Musculoskeletal:  Positive for back pain.        Bilateral flank pain   All other systems reviewed and are negative.    Objective   /58 (BP Location: Left arm, Patient Position: Sitting, BP Cuff Size: Adult)   Pulse (!) 58   Temp 36.4 °C (97.5 °F) (Temporal)   Ht 1.575 m (5' 2\")   Wt 52.5 kg (115 lb 11.2 oz)   SpO2 98%   Physical Exam  General: Well " nourished, well developed female in NAD, awake and conversant.  Eyes: Normal conjunctiva, anicteric.  Round symmetrical pupils.  ENT: Hearing grossly intact.  No nasal discharge.  Neck: Neck is supple.  No masses or thyromegaly.  CV: No lower extremity edema.  Respiratory: Respirations are nonlabored.  No wheezing.  Abdomen: Non-Distended.  Negative CVA tenderness.  Skin: Warm.  No rashes or ulcers.  MSK: Normal ambulation.  No clubbing or cyanosis.  Neuro: Sensation and CN II-XII grossly normal.  Psych: Alert and oriented.  Cooperative, appropriate mood and affect, normal judgment.      Results  Labs   - Urinalysis: Leukocyte esterase trace, no blood detected    Assessment & Plan  1. Flank pain  2. Abnormal urinalysis  New to examiner. Reports a history of kidney infections, sharp lower back pain, possible hematuria, and extreme fatigue for the past 2 months. Urinalysis conducted today showed no blood but a trace of leukocyte esterase. A urine sample will be sent for culture, and the results will be communicated via [employer]. If the culture returns positive for a UTI, an antibiotic prescription will be issued.  - POCT Urinalysis  - URINE CULTURE(NEW); Future    Return if symptoms worsen or fail to improve.      Please note that this dictation was created using voice recognition software. I have made every reasonable attempt to correct obvious errors, but I expect that there are errors of grammar and possibly content that I did not discover before finalizing the note.

## 2025-07-31 PROCEDURE — RXMED WILLOW AMBULATORY MEDICATION CHARGE: Performed by: STUDENT IN AN ORGANIZED HEALTH CARE EDUCATION/TRAINING PROGRAM

## 2025-08-01 ENCOUNTER — PHARMACY VISIT (OUTPATIENT)
Dept: PHARMACY | Facility: MEDICAL CENTER | Age: 70
End: 2025-08-01
Payer: COMMERCIAL

## 2025-08-02 LAB
BACTERIA UR CULT: NORMAL
SIGNIFICANT IND 70042: NORMAL
SITE SITE: NORMAL
SOURCE SOURCE: NORMAL

## 2025-08-04 ENCOUNTER — APPOINTMENT (OUTPATIENT)
Dept: MEDICAL GROUP | Facility: PHYSICIAN GROUP | Age: 70
End: 2025-08-04
Payer: MEDICARE

## 2025-08-04 PROCEDURE — RXMED WILLOW AMBULATORY MEDICATION CHARGE: Performed by: STUDENT IN AN ORGANIZED HEALTH CARE EDUCATION/TRAINING PROGRAM

## 2025-08-07 ENCOUNTER — PHARMACY VISIT (OUTPATIENT)
Dept: PHARMACY | Facility: MEDICAL CENTER | Age: 70
End: 2025-08-07
Payer: COMMERCIAL

## 2025-08-12 ENCOUNTER — HOSPITAL ENCOUNTER (OUTPATIENT)
Dept: LAB | Facility: MEDICAL CENTER | Age: 70
End: 2025-08-12
Attending: NURSE PRACTITIONER
Payer: MEDICARE

## 2025-08-12 ENCOUNTER — OFFICE VISIT (OUTPATIENT)
Dept: MEDICAL GROUP | Facility: PHYSICIAN GROUP | Age: 70
End: 2025-08-12
Payer: MEDICARE

## 2025-08-12 VITALS
OXYGEN SATURATION: 97 % | HEIGHT: 62 IN | WEIGHT: 114.8 LBS | TEMPERATURE: 98.2 F | BODY MASS INDEX: 21.12 KG/M2 | HEART RATE: 60 BPM | SYSTOLIC BLOOD PRESSURE: 116 MMHG | DIASTOLIC BLOOD PRESSURE: 60 MMHG

## 2025-08-12 DIAGNOSIS — R09.82 POST-NASAL DRAINAGE: ICD-10-CM

## 2025-08-12 DIAGNOSIS — H11.89 PALE CONJUNCTIVA: ICD-10-CM

## 2025-08-12 DIAGNOSIS — L60.9 FINGERNAIL PROBLEM: ICD-10-CM

## 2025-08-12 DIAGNOSIS — R05.1 ACUTE COUGH: ICD-10-CM

## 2025-08-12 DIAGNOSIS — R53.83 OTHER FATIGUE: ICD-10-CM

## 2025-08-12 LAB
25(OH)D3 SERPL-MCNC: 38 NG/ML (ref 30–100)
BASOPHILS # BLD AUTO: 0.2 % (ref 0–1.8)
BASOPHILS # BLD: 0.01 K/UL (ref 0–0.12)
EOSINOPHIL # BLD AUTO: 0.08 K/UL (ref 0–0.51)
EOSINOPHIL NFR BLD: 1.7 % (ref 0–6.9)
ERYTHROCYTE [DISTWIDTH] IN BLOOD BY AUTOMATED COUNT: 42.8 FL (ref 35.9–50)
FOLATE SERPL-MCNC: 12.7 NG/ML
HCT VFR BLD AUTO: 42.8 % (ref 37–47)
HGB BLD-MCNC: 14.2 G/DL (ref 12–16)
IMM GRANULOCYTES # BLD AUTO: 0.01 K/UL (ref 0–0.11)
IMM GRANULOCYTES NFR BLD AUTO: 0.2 % (ref 0–0.9)
IRON SATN MFR SERPL: 42 % (ref 15–55)
IRON SERPL-MCNC: 128 UG/DL (ref 40–170)
LYMPHOCYTES # BLD AUTO: 1 K/UL (ref 1–4.8)
LYMPHOCYTES NFR BLD: 21.5 % (ref 22–41)
MCH RBC QN AUTO: 30.3 PG (ref 27–33)
MCHC RBC AUTO-ENTMCNC: 33.2 G/DL (ref 32.2–35.5)
MCV RBC AUTO: 91.3 FL (ref 81.4–97.8)
MONOCYTES # BLD AUTO: 0.4 K/UL (ref 0–0.85)
MONOCYTES NFR BLD AUTO: 8.6 % (ref 0–13.4)
NEUTROPHILS # BLD AUTO: 3.15 K/UL (ref 1.82–7.42)
NEUTROPHILS NFR BLD: 67.8 % (ref 44–72)
NRBC # BLD AUTO: 0 K/UL
NRBC BLD-RTO: 0 /100 WBC (ref 0–0.2)
PLATELET # BLD AUTO: 184 K/UL (ref 164–446)
PMV BLD AUTO: 10.7 FL (ref 9–12.9)
RBC # BLD AUTO: 4.69 M/UL (ref 4.2–5.4)
TIBC SERPL-MCNC: 305 UG/DL (ref 250–450)
UIBC SERPL-MCNC: 177 UG/DL (ref 110–370)
VIT B12 SERPL-MCNC: 497 PG/ML (ref 211–911)
WBC # BLD AUTO: 4.7 K/UL (ref 4.8–10.8)

## 2025-08-12 PROCEDURE — 82306 VITAMIN D 25 HYDROXY: CPT

## 2025-08-12 PROCEDURE — 99213 OFFICE O/P EST LOW 20 MIN: CPT | Performed by: NURSE PRACTITIONER

## 2025-08-12 PROCEDURE — 83540 ASSAY OF IRON: CPT

## 2025-08-12 PROCEDURE — 3074F SYST BP LT 130 MM HG: CPT | Performed by: NURSE PRACTITIONER

## 2025-08-12 PROCEDURE — 83550 IRON BINDING TEST: CPT

## 2025-08-12 PROCEDURE — 82607 VITAMIN B-12: CPT

## 2025-08-12 PROCEDURE — 85025 COMPLETE CBC W/AUTO DIFF WBC: CPT

## 2025-08-12 PROCEDURE — 82746 ASSAY OF FOLIC ACID SERUM: CPT

## 2025-08-12 PROCEDURE — 36415 COLL VENOUS BLD VENIPUNCTURE: CPT

## 2025-08-12 PROCEDURE — 3078F DIAST BP <80 MM HG: CPT | Performed by: NURSE PRACTITIONER

## 2025-08-12 ASSESSMENT — FIBROSIS 4 INDEX: FIB4 SCORE: 2.79

## 2025-08-18 DIAGNOSIS — L60.9 FINGERNAIL PROBLEM: Primary | ICD-10-CM

## 2025-08-26 PROCEDURE — RXMED WILLOW AMBULATORY MEDICATION CHARGE: Performed by: STUDENT IN AN ORGANIZED HEALTH CARE EDUCATION/TRAINING PROGRAM

## 2025-08-28 ENCOUNTER — PHARMACY VISIT (OUTPATIENT)
Dept: PHARMACY | Facility: MEDICAL CENTER | Age: 70
End: 2025-08-28
Payer: COMMERCIAL

## 2025-09-03 ENCOUNTER — APPOINTMENT (OUTPATIENT)
Dept: MEDICAL GROUP | Facility: PHYSICIAN GROUP | Age: 70
End: 2025-09-03
Payer: MEDICARE

## 2025-10-08 ENCOUNTER — APPOINTMENT (OUTPATIENT)
Dept: MEDICAL GROUP | Facility: PHYSICIAN GROUP | Age: 70
End: 2025-10-08
Payer: MEDICARE

## (undated) DEVICE — SUTURE 4-0 ETHILON PS-2 18 (12PK/BX)"

## (undated) DEVICE — SUTURE 3-0 VICRYL PLUS SH - 8X 18 INCH (12/BX)

## (undated) DEVICE — KIT ANESTHESIA W/CIRCUIT & 3/LT BAG W/FILTER (20EA/CA)

## (undated) DEVICE — GLOVE, LITE (PAIR)

## (undated) DEVICE — SET LEADWIRE 5 LEAD BEDSIDE DISPOSABLE ECG (1SET OF 5/EA)

## (undated) DEVICE — BAG RETRIEVAL 10ML (10EA/BX)

## (undated) DEVICE — CANISTER SUCTION 3000ML MECHANICAL FILTER AUTO SHUTOFF MEDI-VAC NONSTERILE LF DISP  (40EA/CA)

## (undated) DEVICE — KIT ROOM DECONTAMINATION

## (undated) DEVICE — PADDING CAST 4 IN STERILE - 4 X 4 YDS (24/CA)

## (undated) DEVICE — WATER IRRIGATION STERILE 1000ML (12EA/CA)

## (undated) DEVICE — PATTIES SURG X-RAYCOTTONOID - 1/2 X 3 IN (200/CA)

## (undated) DEVICE — GLOVE BIOGEL INDICATOR SZ 7SURGICAL PF LTX - (50/BX 4BX/CA)

## (undated) DEVICE — SUCTION INSTRUMENT YANKAUER BULBOUS TIP W/O VENT (50EA/CA)

## (undated) DEVICE — NEPTUNE 4 PORT MANIFOLD - (20/PK)

## (undated) DEVICE — HEAD HOLDER JUNIOR/ADULT

## (undated) DEVICE — CANISTER SUCTION RIGID RED 1500CC (40EA/CA)

## (undated) DEVICE — TUBING CLEARLINK DUO-VENT - C-FLO (48EA/CA)

## (undated) DEVICE — GLOVE BIOGEL PI INDICATOR SZ 6.5 SURGICAL PF LF - (50/BX 4BX/CA)

## (undated) DEVICE — SENSOR SPO2 NEO LNCS ADHESIVE (20/BX) SEE USER NOTES

## (undated) DEVICE — BAG, SPONGE COUNT 50600

## (undated) DEVICE — CLIP MED LG INTNL HRZN TI ESCP - (20/BX)

## (undated) DEVICE — MASK ANESTHESIA ADULT  - (100/CA)

## (undated) DEVICE — GLOVE BIOGEL INDICATOR SZ 8 SURGICAL PF LTX - (50/BX 4BX/CA)

## (undated) DEVICE — DETERGENT RENUZYME PLUS 10 OZ PACKET (50/BX)

## (undated) DEVICE — SODIUM CHL IRRIGATION 0.9% 1000ML (12EA/CA)

## (undated) DEVICE — STOCKINET BIAS 4 IN STERILE - (20/CA)

## (undated) DEVICE — BOVIE FOOT CONTROL SUCTION - 6IN 10FR (25EA/CA)

## (undated) DEVICE — DRAPE C-ARM LARGE 41IN X 74 IN - (10/BX 2BX/CA)

## (undated) DEVICE — PACK UPPER EXTREMITY SM OR - (3/CA)

## (undated) DEVICE — WATER IRRIG. STER. 1500 ML - (9/CA)

## (undated) DEVICE — PACK ENT OR - (2EA/CA)

## (undated) DEVICE — PROTECTOR ULNA NERVE - (36PR/CA)

## (undated) DEVICE — DRESSING TRANSPARENT FILM TEGADERM 2.375 X 2.75"  (100EA/BX)"

## (undated) DEVICE — SLEEVE, VASO, THIGH, MED

## (undated) DEVICE — CHLORAPREP 26 ML APPLICATOR - ORANGE TINT(25/CA)

## (undated) DEVICE — HUMID-VENT HEAT AND MOISTURE EXCHANGE- (50/BX)

## (undated) DEVICE — ELECTRODE DUAL RETURN W/ CORD - (50/PK)

## (undated) DEVICE — GLOVE BIOGEL SZ 7.5 SURGICAL PF LTX - (50PR/BX 4BX/CA)

## (undated) DEVICE — TOURNIQUET CUFF 18 X 3 ONE PORT DISP - STERILE (10/BX)

## (undated) DEVICE — SPONGE NASAL MRCL 400402 (20EA/PK)

## (undated) DEVICE — ELECTRODE 850 FOAM ADHESIVE - HYDROGEL RADIOTRNSPRNT (50/PK)

## (undated) DEVICE — GLOVE BIOGEL SZ 8 SURGICAL PF LTX - (50PR/BX 4BX/CA)

## (undated) DEVICE — SUTURE 4-0 VICRYL PLUS FS-2 - 27 INCH (36/BX)

## (undated) DEVICE — SPLINT PLASTER 4 IN  X 15 IN - (50/BX 12BX/CA)

## (undated) DEVICE — DRAPE LARGE 3 QUARTER - (20/CA)

## (undated) DEVICE — SUTURE 3-0 VICRYL PLUS RB-1 - 8 X 18 INCH (12/BX)

## (undated) DEVICE — SLING ORTH UNV TIETX VLFM ARM

## (undated) DEVICE — TUBE TRACHEAL RAE 6.5MM (10EA/BX)

## (undated) DEVICE — GOWN WARMING STANDARD FLEX - (30/CA)

## (undated) DEVICE — SPECIMEN PLASTIC CONVERTOR - (10/CA)

## (undated) DEVICE — SUTURE 4-0 PLAIN GUT SC-1 ETHICON (36PK/BX)

## (undated) DEVICE — DRAPE LAPAROTOMY T SHEET - (12EA/CA)

## (undated) DEVICE — SPONGE GAUZESTER 4 X 4 4PLY - (128PK/CA)

## (undated) DEVICE — BLADE SURGICAL #15 - (50/BX 3BX/CA)

## (undated) DEVICE — DRESSING XEROFORM 1X8 - (50/BX 4BX/CA)

## (undated) DEVICE — CLOSURE WOUND 1/4 X 4 (STERI - STRIP) (50/BX 4BX/CA)

## (undated) DEVICE — STAPLER 45MM ARTICULATING - ENDO (3EA/BX)

## (undated) DEVICE — BLADE SURGICAL #11 - (50/BX)

## (undated) DEVICE — TROCAR 5X100 BLADED Z-THREAD - KII (6/BX)

## (undated) DEVICE — TUBE CONNECTING SUCTION - CLEAR PLASTIC STERILE 72 IN (50EA/CA)

## (undated) DEVICE — TUBE CONNECT SUCTION CLEAR 120 X 1/4" (50EA/CA)"

## (undated) DEVICE — PACK LAP CHOLE OR - (2EA/CA)

## (undated) DEVICE — GLOVE BIOGEL SZ 6.5 SURGICAL PF LTX (50PR/BX 4BX/CA)

## (undated) DEVICE — SPONGE GAUZESTER. 2X2 4-PL - (2/PK 50PK/BX 30BX/CS)

## (undated) DEVICE — TROCAR Z THREAD 12 X 100 - BLADED (6/BX)

## (undated) DEVICE — GLOVE BIOGEL SZ 7 SURGICAL PF LTX - (50PR/BX 4BX/CA)

## (undated) DEVICE — CANNULA W/SEAL 5X100 Z-THRE - ADED KII (12/BX)

## (undated) DEVICE — TUBING INSUFFLATION - (10/BX)

## (undated) DEVICE — GLOVE BIOGEL INDICATOR SZ 7.5 SURGICAL PF LTX - (50PR/BX 4BX/CA)

## (undated) DEVICE — STAPLE 45MM BLUE 4.5MM (12EA/BX)

## (undated) DEVICE — DRESSING MASS EYE & EAR SIZE 2 (2EA/PK  50PK/BX  100EA/BX)

## (undated) DEVICE — MASK AIRWAY SIZE 3 UNIQUE SILICON (10/BX)

## (undated) DEVICE — SET SUCTION/IRRIGATION WITH DISPOSABLE TIP (6/CA )PART #0250-070-520 IS A SUB

## (undated) DEVICE — LACTATED RINGERS INJ 1000 ML - (14EA/CA 60CA/PF)

## (undated) DEVICE — ANTI-FOG SOLUTION - 60BTL/CA

## (undated) DEVICE — SUTURE GENERAL

## (undated) DEVICE — PEN SKIN MARKER W/RULER - (50EA/BX)

## (undated) DEVICE — SUTURE 0 VICRYL PLUS CT-2 - 27 INCH (36/BX)

## (undated) DEVICE — MASK AIRWAY SZ 2.5 UNIQUE SILICON (10EA/BX)

## (undated) DEVICE — KIT  I.V. START (100EA/CA)

## (undated) DEVICE — SPLINT NASAL DOYLE AIRWAY (2EA/ST)

## (undated) DEVICE — CATHETER IV 20 GA X 1-1/4 ---SURG.& SDS ONLY--- (50EA/BX)

## (undated) DEVICE — STAPLE 45MM VASCULAR WHITE 2.5MM (12EA/BX)

## (undated) DEVICE — SUTURE 2-0 PROLENE SH D/A (36PK/BX)

## (undated) DEVICE — GLOVE BIOGEL INDICATOR SZ 6.5 SURGICAL PF LTX - (50PR/BX 4BX/CA)

## (undated) DEVICE — SUTURE 4-0 ETHILON FS-2 18 (36PK/BX)"

## (undated) DEVICE — GLOVE BIOGEL INDICATOR SZ 6 SURGICAL PF LTX -(50/BX)

## (undated) DEVICE — CLOSURE SKIN STRIP 1/2 X 4 IN - (STERI STRIP) (50/BX 4BX/CA)

## (undated) DEVICE — FIBERWIRE 4-0 - (12/BX)

## (undated) DEVICE — SET EXTENSION WITH 2 PORTS (48EA/CA) ***PART #2C8610 IS A SUBSTITUTE*****

## (undated) DEVICE — STERI STRIP COMPOUND BENZOIN - TINCTURE 0.6ML WITH APPLICATOR (40EA/BX)

## (undated) DEVICE — SYRINGE 10 ML CONTROL LL (25EA/BX 4BX/CA)

## (undated) DEVICE — PACK MINOR BASIN - (2EA/CA)